# Patient Record
Sex: MALE | Race: WHITE | NOT HISPANIC OR LATINO | Employment: OTHER | URBAN - METROPOLITAN AREA
[De-identification: names, ages, dates, MRNs, and addresses within clinical notes are randomized per-mention and may not be internally consistent; named-entity substitution may affect disease eponyms.]

---

## 2017-01-31 ENCOUNTER — OFFICE VISIT - HEALTHEAST (OUTPATIENT)
Dept: INTERNAL MEDICINE | Facility: CLINIC | Age: 77
End: 2017-01-31

## 2017-01-31 ENCOUNTER — RECORDS - HEALTHEAST (OUTPATIENT)
Dept: GENERAL RADIOLOGY | Facility: CLINIC | Age: 77
End: 2017-01-31

## 2017-01-31 DIAGNOSIS — E78.2 MIXED HYPERLIPIDEMIA: ICD-10-CM

## 2017-01-31 DIAGNOSIS — G89.29 OTHER CHRONIC PAIN: ICD-10-CM

## 2017-01-31 DIAGNOSIS — I10 ESSENTIAL HYPERTENSION: ICD-10-CM

## 2017-01-31 DIAGNOSIS — M25.561 CHRONIC PAIN OF RIGHT KNEE: ICD-10-CM

## 2017-01-31 DIAGNOSIS — I25.10 CHRONIC CORONARY ARTERY DISEASE: ICD-10-CM

## 2017-01-31 DIAGNOSIS — G89.29 CHRONIC PAIN OF RIGHT KNEE: ICD-10-CM

## 2017-01-31 DIAGNOSIS — M25.561 PAIN IN RIGHT KNEE: ICD-10-CM

## 2017-01-31 DIAGNOSIS — I50.20 NYHA CLASS 1 HEART FAILURE WITH REDUCED EJECTION FRACTION (H): ICD-10-CM

## 2017-01-31 ASSESSMENT — MIFFLIN-ST. JEOR: SCORE: 2170.9

## 2017-02-06 ENCOUNTER — COMMUNICATION - HEALTHEAST (OUTPATIENT)
Dept: INTERNAL MEDICINE | Facility: CLINIC | Age: 77
End: 2017-02-06

## 2017-02-17 ENCOUNTER — OFFICE VISIT - HEALTHEAST (OUTPATIENT)
Dept: INTERNAL MEDICINE | Facility: CLINIC | Age: 77
End: 2017-02-17

## 2017-02-17 DIAGNOSIS — I10 ESSENTIAL HYPERTENSION: ICD-10-CM

## 2017-02-17 DIAGNOSIS — I50.20 NYHA CLASS 1 HEART FAILURE WITH REDUCED EJECTION FRACTION (H): ICD-10-CM

## 2017-02-17 DIAGNOSIS — M54.50 CHRONIC MIDLINE LOW BACK PAIN WITHOUT SCIATICA: ICD-10-CM

## 2017-02-17 DIAGNOSIS — E78.2 MIXED HYPERLIPIDEMIA: ICD-10-CM

## 2017-02-17 DIAGNOSIS — G89.29 CHRONIC MIDLINE LOW BACK PAIN WITHOUT SCIATICA: ICD-10-CM

## 2017-02-17 DIAGNOSIS — M17.0 PRIMARY OSTEOARTHRITIS OF BOTH KNEES: ICD-10-CM

## 2017-02-17 DIAGNOSIS — I25.10 CHRONIC CORONARY ARTERY DISEASE: ICD-10-CM

## 2017-02-17 ASSESSMENT — MIFFLIN-ST. JEOR: SCORE: 2157.29

## 2017-02-21 ENCOUNTER — COMMUNICATION - HEALTHEAST (OUTPATIENT)
Dept: INTERNAL MEDICINE | Facility: CLINIC | Age: 77
End: 2017-02-21

## 2017-04-04 ENCOUNTER — RECORDS - HEALTHEAST (OUTPATIENT)
Dept: ADMINISTRATIVE | Facility: OTHER | Age: 77
End: 2017-04-04

## 2017-09-22 ENCOUNTER — COMMUNICATION - HEALTHEAST (OUTPATIENT)
Dept: INTERNAL MEDICINE | Facility: CLINIC | Age: 77
End: 2017-09-22

## 2017-09-28 ENCOUNTER — OFFICE VISIT - HEALTHEAST (OUTPATIENT)
Dept: INTERNAL MEDICINE | Facility: CLINIC | Age: 77
End: 2017-09-28

## 2017-09-28 DIAGNOSIS — Z23 NEED FOR PROPHYLACTIC VACCINATION AND INOCULATION AGAINST INFLUENZA: ICD-10-CM

## 2017-09-28 DIAGNOSIS — I10 ESSENTIAL HYPERTENSION: ICD-10-CM

## 2017-09-28 DIAGNOSIS — I50.20 NYHA CLASS 1 HEART FAILURE WITH REDUCED EJECTION FRACTION (H): ICD-10-CM

## 2017-09-28 DIAGNOSIS — T78.3XXD ANGIOEDEMA, SUBSEQUENT ENCOUNTER: ICD-10-CM

## 2017-09-28 ASSESSMENT — MIFFLIN-ST. JEOR: SCORE: 2175.44

## 2017-10-18 ENCOUNTER — COMMUNICATION - HEALTHEAST (OUTPATIENT)
Dept: INTERNAL MEDICINE | Facility: CLINIC | Age: 77
End: 2017-10-18

## 2017-10-18 ENCOUNTER — OFFICE VISIT - HEALTHEAST (OUTPATIENT)
Dept: INTERNAL MEDICINE | Facility: CLINIC | Age: 77
End: 2017-10-18

## 2017-10-18 DIAGNOSIS — I25.10 CHRONIC CORONARY ARTERY DISEASE: ICD-10-CM

## 2017-10-18 DIAGNOSIS — I10 ESSENTIAL HYPERTENSION: ICD-10-CM

## 2017-10-18 DIAGNOSIS — E66.9 ADIPOSITY: ICD-10-CM

## 2017-10-18 DIAGNOSIS — E78.2 MIXED HYPERLIPIDEMIA: ICD-10-CM

## 2017-10-18 DIAGNOSIS — I50.20 NYHA CLASS 1 HEART FAILURE WITH REDUCED EJECTION FRACTION (H): ICD-10-CM

## 2017-10-18 LAB
CHOLEST SERPL-MCNC: 126 MG/DL
FASTING STATUS PATIENT QL REPORTED: YES
HDLC SERPL-MCNC: 33 MG/DL
LDLC SERPL CALC-MCNC: 70 MG/DL
TRIGL SERPL-MCNC: 114 MG/DL

## 2017-10-18 ASSESSMENT — MIFFLIN-ST. JEOR: SCORE: 2184.51

## 2017-11-16 ENCOUNTER — RECORDS - HEALTHEAST (OUTPATIENT)
Dept: ADMINISTRATIVE | Facility: OTHER | Age: 77
End: 2017-11-16

## 2017-12-15 ENCOUNTER — COMMUNICATION - HEALTHEAST (OUTPATIENT)
Dept: INTERNAL MEDICINE | Facility: CLINIC | Age: 77
End: 2017-12-15

## 2017-12-15 DIAGNOSIS — K21.9 GASTROESOPHAGEAL REFLUX DISEASE WITHOUT ESOPHAGITIS: ICD-10-CM

## 2017-12-15 DIAGNOSIS — I10 ESSENTIAL HYPERTENSION: ICD-10-CM

## 2017-12-15 DIAGNOSIS — E78.2 MIXED HYPERLIPIDEMIA: ICD-10-CM

## 2018-02-06 ENCOUNTER — OFFICE VISIT - HEALTHEAST (OUTPATIENT)
Dept: INTERNAL MEDICINE | Facility: CLINIC | Age: 78
End: 2018-02-06

## 2018-02-06 DIAGNOSIS — E66.9 ADIPOSITY: ICD-10-CM

## 2018-02-06 DIAGNOSIS — M54.50 LOW BACK PAIN: ICD-10-CM

## 2018-02-06 DIAGNOSIS — G89.29 CHRONIC RIGHT-SIDED LOW BACK PAIN WITH RIGHT-SIDED SCIATICA: ICD-10-CM

## 2018-02-06 DIAGNOSIS — M54.41 CHRONIC RIGHT-SIDED LOW BACK PAIN WITH RIGHT-SIDED SCIATICA: ICD-10-CM

## 2018-02-06 DIAGNOSIS — I10 ESSENTIAL HYPERTENSION: ICD-10-CM

## 2018-02-06 DIAGNOSIS — I25.10 CHRONIC CORONARY ARTERY DISEASE: ICD-10-CM

## 2018-02-06 DIAGNOSIS — K21.9 GASTROESOPHAGEAL REFLUX DISEASE WITHOUT ESOPHAGITIS: ICD-10-CM

## 2018-02-06 DIAGNOSIS — I50.20 NYHA CLASS 1 HEART FAILURE WITH REDUCED EJECTION FRACTION (H): ICD-10-CM

## 2018-02-06 ASSESSMENT — MIFFLIN-ST. JEOR: SCORE: 2179.97

## 2018-02-12 ENCOUNTER — HOSPITAL ENCOUNTER (OUTPATIENT)
Dept: PHYSICAL MEDICINE AND REHAB | Facility: CLINIC | Age: 78
Discharge: HOME | End: 2018-02-12
Attending: INTERNAL MEDICINE

## 2018-02-12 DIAGNOSIS — M17.11 PRIMARY OSTEOARTHRITIS OF RIGHT KNEE: ICD-10-CM

## 2018-02-12 DIAGNOSIS — M54.16 LUMBAR RADICULITIS: ICD-10-CM

## 2018-02-12 DIAGNOSIS — M47.816 LUMBAR FACET ARTHROPATHY: ICD-10-CM

## 2018-02-12 DIAGNOSIS — M48.061 LUMBAR SPINAL STENOSIS: ICD-10-CM

## 2018-02-13 ENCOUNTER — HOSPITAL ENCOUNTER (OUTPATIENT)
Dept: CT IMAGING | Facility: CLINIC | Age: 78
Discharge: HOME | End: 2018-02-13
Attending: PHYSICIAN ASSISTANT

## 2018-02-13 DIAGNOSIS — M48.061 LUMBAR SPINAL STENOSIS: ICD-10-CM

## 2018-02-13 DIAGNOSIS — M47.816 LUMBAR FACET ARTHROPATHY: ICD-10-CM

## 2018-02-13 DIAGNOSIS — M54.16 LUMBAR RADICULITIS: ICD-10-CM

## 2018-02-13 DIAGNOSIS — M12.88 OTHER SPECIFIC ARTHROPATHIES, NOT ELSEWHERE CLASSIFIED, OTHER SPECIFIED SITE: ICD-10-CM

## 2018-02-15 ENCOUNTER — COMMUNICATION - HEALTHEAST (OUTPATIENT)
Dept: PHYSICAL MEDICINE AND REHAB | Facility: CLINIC | Age: 78
End: 2018-02-15

## 2018-02-15 DIAGNOSIS — M54.16 LUMBAR RADICULITIS: ICD-10-CM

## 2018-02-21 ENCOUNTER — OFFICE VISIT - HEALTHEAST (OUTPATIENT)
Dept: PHYSICAL THERAPY | Facility: REHABILITATION | Age: 78
End: 2018-02-21

## 2018-02-21 DIAGNOSIS — R68.89 DECREASED STRENGTH, ENDURANCE, AND MOBILITY: ICD-10-CM

## 2018-02-21 DIAGNOSIS — R53.1 DECREASED STRENGTH, ENDURANCE, AND MOBILITY: ICD-10-CM

## 2018-02-21 DIAGNOSIS — R26.81 UNSTEADINESS ON FEET: ICD-10-CM

## 2018-02-21 DIAGNOSIS — M53.86 DECREASED ROM OF LUMBAR SPINE: ICD-10-CM

## 2018-02-21 DIAGNOSIS — M54.41 ACUTE BILATERAL LOW BACK PAIN WITH RIGHT-SIDED SCIATICA: ICD-10-CM

## 2018-02-21 DIAGNOSIS — M62.81 GENERALIZED MUSCLE WEAKNESS: ICD-10-CM

## 2018-02-21 DIAGNOSIS — Z74.09 DECREASED STRENGTH, ENDURANCE, AND MOBILITY: ICD-10-CM

## 2018-02-22 ENCOUNTER — COMMUNICATION - HEALTHEAST (OUTPATIENT)
Dept: PHYSICAL MEDICINE AND REHAB | Facility: CLINIC | Age: 78
End: 2018-02-22

## 2018-02-28 ENCOUNTER — OFFICE VISIT - HEALTHEAST (OUTPATIENT)
Dept: PHYSICAL THERAPY | Facility: REHABILITATION | Age: 78
End: 2018-02-28

## 2018-02-28 DIAGNOSIS — R26.81 UNSTEADINESS ON FEET: ICD-10-CM

## 2018-02-28 DIAGNOSIS — M62.81 GENERALIZED MUSCLE WEAKNESS: ICD-10-CM

## 2018-02-28 DIAGNOSIS — M53.86 DECREASED ROM OF LUMBAR SPINE: ICD-10-CM

## 2018-02-28 DIAGNOSIS — R68.89 DECREASED STRENGTH, ENDURANCE, AND MOBILITY: ICD-10-CM

## 2018-02-28 DIAGNOSIS — R53.1 DECREASED STRENGTH, ENDURANCE, AND MOBILITY: ICD-10-CM

## 2018-02-28 DIAGNOSIS — M54.41 ACUTE BILATERAL LOW BACK PAIN WITH RIGHT-SIDED SCIATICA: ICD-10-CM

## 2018-02-28 DIAGNOSIS — Z74.09 DECREASED STRENGTH, ENDURANCE, AND MOBILITY: ICD-10-CM

## 2018-03-07 ENCOUNTER — OFFICE VISIT - HEALTHEAST (OUTPATIENT)
Dept: INTERNAL MEDICINE | Facility: CLINIC | Age: 78
End: 2018-03-07

## 2018-03-07 DIAGNOSIS — I50.20 NYHA CLASS 1 HEART FAILURE WITH REDUCED EJECTION FRACTION (H): ICD-10-CM

## 2018-03-07 DIAGNOSIS — I10 ESSENTIAL HYPERTENSION: ICD-10-CM

## 2018-03-07 DIAGNOSIS — T78.3XXA ANGIOEDEMA, INITIAL ENCOUNTER: ICD-10-CM

## 2018-03-07 ASSESSMENT — MIFFLIN-ST. JEOR: SCORE: 2123.27

## 2018-03-08 ENCOUNTER — OFFICE VISIT - HEALTHEAST (OUTPATIENT)
Dept: PHYSICAL THERAPY | Facility: REHABILITATION | Age: 78
End: 2018-03-08

## 2018-03-08 DIAGNOSIS — Z74.09 DECREASED STRENGTH, ENDURANCE, AND MOBILITY: ICD-10-CM

## 2018-03-08 DIAGNOSIS — R53.1 DECREASED STRENGTH, ENDURANCE, AND MOBILITY: ICD-10-CM

## 2018-03-08 DIAGNOSIS — R26.81 UNSTEADINESS ON FEET: ICD-10-CM

## 2018-03-08 DIAGNOSIS — M53.86 DECREASED ROM OF LUMBAR SPINE: ICD-10-CM

## 2018-03-08 DIAGNOSIS — M54.41 ACUTE BILATERAL LOW BACK PAIN WITH RIGHT-SIDED SCIATICA: ICD-10-CM

## 2018-03-08 DIAGNOSIS — M62.81 GENERALIZED MUSCLE WEAKNESS: ICD-10-CM

## 2018-03-08 DIAGNOSIS — R68.89 DECREASED STRENGTH, ENDURANCE, AND MOBILITY: ICD-10-CM

## 2018-03-12 ENCOUNTER — OFFICE VISIT - HEALTHEAST (OUTPATIENT)
Dept: ALLERGY | Facility: CLINIC | Age: 78
End: 2018-03-12

## 2018-03-12 DIAGNOSIS — T78.3XXD ANGIOEDEMA, SUBSEQUENT ENCOUNTER: ICD-10-CM

## 2018-03-12 ASSESSMENT — MIFFLIN-ST. JEOR: SCORE: 2121.01

## 2018-03-13 ENCOUNTER — COMMUNICATION - HEALTHEAST (OUTPATIENT)
Dept: INTERNAL MEDICINE | Facility: CLINIC | Age: 78
End: 2018-03-13

## 2018-03-13 DIAGNOSIS — I10 ESSENTIAL HYPERTENSION: ICD-10-CM

## 2018-03-13 DIAGNOSIS — K21.9 GASTROESOPHAGEAL REFLUX DISEASE WITHOUT ESOPHAGITIS: ICD-10-CM

## 2018-03-13 DIAGNOSIS — E78.2 MIXED HYPERLIPIDEMIA: ICD-10-CM

## 2018-03-15 ENCOUNTER — OFFICE VISIT - HEALTHEAST (OUTPATIENT)
Dept: PHYSICAL THERAPY | Facility: REHABILITATION | Age: 78
End: 2018-03-15

## 2018-03-15 DIAGNOSIS — R68.89 DECREASED STRENGTH, ENDURANCE, AND MOBILITY: ICD-10-CM

## 2018-03-15 DIAGNOSIS — R53.1 DECREASED STRENGTH, ENDURANCE, AND MOBILITY: ICD-10-CM

## 2018-03-15 DIAGNOSIS — M53.86 DECREASED ROM OF LUMBAR SPINE: ICD-10-CM

## 2018-03-15 DIAGNOSIS — R26.81 UNSTEADINESS ON FEET: ICD-10-CM

## 2018-03-15 DIAGNOSIS — M54.41 ACUTE BILATERAL LOW BACK PAIN WITH RIGHT-SIDED SCIATICA: ICD-10-CM

## 2018-03-15 DIAGNOSIS — M62.81 GENERALIZED MUSCLE WEAKNESS: ICD-10-CM

## 2018-03-15 DIAGNOSIS — Z74.09 DECREASED STRENGTH, ENDURANCE, AND MOBILITY: ICD-10-CM

## 2018-03-20 ENCOUNTER — RECORDS - HEALTHEAST (OUTPATIENT)
Dept: ADMINISTRATIVE | Facility: OTHER | Age: 78
End: 2018-03-20

## 2018-03-22 ENCOUNTER — OFFICE VISIT - HEALTHEAST (OUTPATIENT)
Dept: PHYSICAL THERAPY | Facility: REHABILITATION | Age: 78
End: 2018-03-22

## 2018-03-22 DIAGNOSIS — M54.41 ACUTE BILATERAL LOW BACK PAIN WITH RIGHT-SIDED SCIATICA: ICD-10-CM

## 2018-03-22 DIAGNOSIS — M53.86 DECREASED ROM OF LUMBAR SPINE: ICD-10-CM

## 2018-03-22 DIAGNOSIS — R53.1 DECREASED STRENGTH, ENDURANCE, AND MOBILITY: ICD-10-CM

## 2018-03-22 DIAGNOSIS — M62.81 GENERALIZED MUSCLE WEAKNESS: ICD-10-CM

## 2018-03-22 DIAGNOSIS — R68.89 DECREASED STRENGTH, ENDURANCE, AND MOBILITY: ICD-10-CM

## 2018-03-22 DIAGNOSIS — Z74.09 DECREASED STRENGTH, ENDURANCE, AND MOBILITY: ICD-10-CM

## 2018-03-22 DIAGNOSIS — R26.81 UNSTEADINESS ON FEET: ICD-10-CM

## 2018-04-18 ENCOUNTER — OFFICE VISIT - HEALTHEAST (OUTPATIENT)
Dept: INTERNAL MEDICINE | Facility: CLINIC | Age: 78
End: 2018-04-18

## 2018-04-18 ENCOUNTER — COMMUNICATION - HEALTHEAST (OUTPATIENT)
Dept: INTERNAL MEDICINE | Facility: CLINIC | Age: 78
End: 2018-04-18

## 2018-04-18 DIAGNOSIS — I50.20 NYHA CLASS 1 HEART FAILURE WITH REDUCED EJECTION FRACTION (H): ICD-10-CM

## 2018-04-18 DIAGNOSIS — E66.9 OBESITY, UNSPECIFIED CLASSIFICATION, UNSPECIFIED OBESITY TYPE, UNSPECIFIED WHETHER SERIOUS COMORBIDITY PRESENT: ICD-10-CM

## 2018-04-18 DIAGNOSIS — K21.9 GASTROESOPHAGEAL REFLUX DISEASE WITHOUT ESOPHAGITIS: ICD-10-CM

## 2018-04-18 DIAGNOSIS — I25.10 CHRONIC CORONARY ARTERY DISEASE: ICD-10-CM

## 2018-04-18 DIAGNOSIS — T78.3XXA ANGIOEDEMA, INITIAL ENCOUNTER: ICD-10-CM

## 2018-04-18 DIAGNOSIS — I10 ESSENTIAL HYPERTENSION: ICD-10-CM

## 2018-04-18 LAB
ALBUMIN SERPL-MCNC: 3.9 G/DL (ref 3.5–5)
ALP SERPL-CCNC: 55 U/L (ref 45–120)
ALT SERPL W P-5'-P-CCNC: 24 U/L (ref 0–45)
ANION GAP SERPL CALCULATED.3IONS-SCNC: 10 MMOL/L (ref 5–18)
AST SERPL W P-5'-P-CCNC: 21 U/L (ref 0–40)
BILIRUB SERPL-MCNC: 2.4 MG/DL (ref 0–1)
BUN SERPL-MCNC: 18 MG/DL (ref 8–28)
CALCIUM SERPL-MCNC: 9.3 MG/DL (ref 8.5–10.5)
CHLORIDE BLD-SCNC: 106 MMOL/L (ref 98–107)
CHOLEST SERPL-MCNC: 115 MG/DL
CO2 SERPL-SCNC: 23 MMOL/L (ref 22–31)
CREAT SERPL-MCNC: 0.8 MG/DL (ref 0.7–1.3)
FASTING STATUS PATIENT QL REPORTED: YES
GFR SERPL CREATININE-BSD FRML MDRD: >60 ML/MIN/1.73M2
GLUCOSE BLD-MCNC: 96 MG/DL (ref 70–125)
HDLC SERPL-MCNC: 34 MG/DL
LDLC SERPL CALC-MCNC: 63 MG/DL
POTASSIUM BLD-SCNC: 4 MMOL/L (ref 3.5–5)
PROT SERPL-MCNC: 6.7 G/DL (ref 6–8)
SODIUM SERPL-SCNC: 139 MMOL/L (ref 136–145)
TRIGL SERPL-MCNC: 92 MG/DL

## 2018-04-18 ASSESSMENT — MIFFLIN-ST. JEOR: SCORE: 2043.9

## 2018-05-01 ENCOUNTER — AMBULATORY - HEALTHEAST (OUTPATIENT)
Dept: CARDIOLOGY | Facility: CLINIC | Age: 78
End: 2018-05-01

## 2018-05-01 ENCOUNTER — OFFICE VISIT - HEALTHEAST (OUTPATIENT)
Dept: CARDIOLOGY | Facility: CLINIC | Age: 78
End: 2018-05-01

## 2018-05-01 DIAGNOSIS — I25.10 CHRONIC CORONARY ARTERY DISEASE: ICD-10-CM

## 2018-05-01 DIAGNOSIS — Z95.810 ICD (IMPLANTABLE CARDIOVERTER-DEFIBRILLATOR), SINGLE, IN SITU: ICD-10-CM

## 2018-05-01 LAB
HCC DEVICE COMMENTS: NORMAL
HCC DEVICE IMPLANTING PROVIDER: NORMAL
HCC DEVICE MANUFACTURE: NORMAL
HCC DEVICE MODEL: NORMAL
HCC DEVICE SERIAL NUMBER: NORMAL
HCC DEVICE TYPE: NORMAL

## 2018-05-01 ASSESSMENT — MIFFLIN-ST. JEOR: SCORE: 2034.82

## 2018-05-17 ENCOUNTER — HOSPITAL ENCOUNTER (OUTPATIENT)
Dept: CARDIOLOGY | Facility: CLINIC | Age: 78
Discharge: HOME | End: 2018-05-17
Attending: INTERNAL MEDICINE

## 2018-05-17 DIAGNOSIS — I25.10 CHRONIC CORONARY ARTERY DISEASE: ICD-10-CM

## 2018-05-17 LAB
AORTIC ROOT: 4.1 CM
AORTIC VALVE MEAN VELOCITY: 85.9 CM/S
ASCENDING AORTA: 3.5 CM
AV CUSP SEPERATION: 2.2 CM
AV CUSP SEPERATION: 2.2 CM
AV DIMENSIONLESS INDEX VTI: 0.8
AV MEAN GRADIENT: 3 MMHG
AV PEAK GRADIENT: 4.4 MMHG
AV VALVE AREA: 3.5 CM2
AV VELOCITY RATIO: 0.7
BSA FOR ECHO PROCEDURE: 2.57 M2
CV ECHO HEIGHT: 72 IN
CV ECHO WEIGHT: 287 LBS
DOP CALC AO PEAK VEL: 105 CM/S
DOP CALC AO VTI: 24.1 CM
DOP CALC LVOT AREA: 4.52 CM2
DOP CALC LVOT DIAMETER: 2.4 CM
DOP CALC LVOT PEAK VEL: 73.7 CM/S
DOP CALC LVOT STROKE VOLUME: 84.1 CM3
DOP CALC MV VTI: 23.2 CM
DOP CALCLVOT PEAK VEL VTI: 18.6 CM
ECHO EJECTION FRACTION ESTIMATED: 50 %
EJECTION FRACTION: 61 % (ref 55–75)
FRACTIONAL SHORTENING: 32.6 % (ref 28–44)
INTERVENTRICULAR SEPTUM IN END DIASTOLE: 1.6 CM (ref 0.6–1)
IVS/PW RATIO: 1.1
LA AREA 1: 23.9 CM2
LA AREA 2: 25.2 CM2
LEFT ATRIUM LENGTH: 5.96 CM
LEFT ATRIUM SIZE: 4.1 CM
LEFT ATRIUM VOLUME INDEX: 33.4 ML/M2
LEFT ATRIUM VOLUME: 85.9 ML
LEFT VENTRICLE CARDIAC INDEX: 2 L/MIN/M2
LEFT VENTRICLE CARDIAC OUTPUT: 5.1 L/MIN
LEFT VENTRICLE DIASTOLIC VOLUME INDEX: 61.9 CM3/M2 (ref 34–74)
LEFT VENTRICLE DIASTOLIC VOLUME: 159 CM3 (ref 62–150)
LEFT VENTRICLE HEART RATE: 61 BPM
LEFT VENTRICLE MASS INDEX: 105.7 G/M2
LEFT VENTRICLE SYSTOLIC VOLUME INDEX: 24.1 CM3/M2 (ref 11–31)
LEFT VENTRICLE SYSTOLIC VOLUME: 62 CM3 (ref 21–61)
LEFT VENTRICULAR INTERNAL DIMENSION IN DIASTOLE: 4.3 CM (ref 4.2–5.8)
LEFT VENTRICULAR INTERNAL DIMENSION IN SYSTOLE: 2.9 CM (ref 2.5–4)
LEFT VENTRICULAR MASS: 271.6 G
LEFT VENTRICULAR OUTFLOW TRACT MEAN GRADIENT: 1 MMHG
LEFT VENTRICULAR OUTFLOW TRACT MEAN VELOCITY: 56.2 CM/S
LEFT VENTRICULAR OUTFLOW TRACT PEAK GRADIENT: 2 MMHG
LEFT VENTRICULAR POSTERIOR WALL IN END DIASTOLE: 1.5 CM (ref 0.6–1)
LV STROKE VOLUME INDEX: 32.7 ML/M2
MITRAL VALVE DECELERATION SLOPE: 3120 MM/S2
MITRAL VALVE E/A RATIO: 0.8
MITRAL VALVE MEAN INFLOW VELOCITY: 41.3 CM/S
MITRAL VALVE PEAK VELOCITY: 83.4 CM/S
MITRAL VALVE PRESSURE HALF-TIME: 81 MS
MV AREA VTI: 3.63 CM2
MV AVERAGE E/E' RATIO: 11.1 CM/S
MV DECELERATION TIME: 190 MS
MV E'TISSUE VEL-LAT: 6.82 CM/S
MV E'TISSUE VEL-MED: 4.48 CM/S
MV LATERAL E/E' RATIO: 9.2
MV MEAN GRADIENT: 1 MMHG
MV MEDIAL E/E' RATIO: 14
MV PEAK A VELOCITY: 76.5 CM/S
MV PEAK E VELOCITY: 62.7 CM/S
MV PEAK GRADIENT: 2.8 MMHG
MV VALVE AREA BY CONTINUITY EQUATION: 3.6 CM2
MV VALVE AREA PRESSURE 1/2 METHOD: 2.7 CM2
NUC REST DIASTOLIC VOLUME INDEX: 4592 LBS
NUC REST SYSTOLIC VOLUME INDEX: 72 IN
TRICUSPID REGURGITATION PEAK PRESSURE GRADIENT: 14.9 MMHG
TRICUSPID VALVE ANULAR PLANE SYSTOLIC EXCURSION: 2.5 CM
TRICUSPID VALVE PEAK REGURGITANT VELOCITY: 193 CM/S

## 2018-05-17 ASSESSMENT — MIFFLIN-ST. JEOR: SCORE: 2034.82

## 2018-06-05 ENCOUNTER — COMMUNICATION - HEALTHEAST (OUTPATIENT)
Dept: INTERNAL MEDICINE | Facility: CLINIC | Age: 78
End: 2018-06-05

## 2018-07-10 ENCOUNTER — RECORDS - HEALTHEAST (OUTPATIENT)
Dept: ADMINISTRATIVE | Facility: OTHER | Age: 78
End: 2018-07-10

## 2018-07-11 ENCOUNTER — OFFICE VISIT - HEALTHEAST (OUTPATIENT)
Dept: INTERNAL MEDICINE | Facility: CLINIC | Age: 78
End: 2018-07-11

## 2018-07-11 DIAGNOSIS — I50.20 NYHA CLASS 1 HEART FAILURE WITH REDUCED EJECTION FRACTION (H): ICD-10-CM

## 2018-07-11 DIAGNOSIS — I25.10 CHRONIC CORONARY ARTERY DISEASE: ICD-10-CM

## 2018-07-11 DIAGNOSIS — E66.01 MORBID OBESITY (H): ICD-10-CM

## 2018-07-11 DIAGNOSIS — Z95.810 ICD (IMPLANTABLE CARDIOVERTER-DEFIBRILLATOR), SINGLE, IN SITU: ICD-10-CM

## 2018-07-11 DIAGNOSIS — I10 ESSENTIAL HYPERTENSION: ICD-10-CM

## 2018-07-11 DIAGNOSIS — E78.2 MIXED HYPERLIPIDEMIA: ICD-10-CM

## 2018-07-11 DIAGNOSIS — T78.3XXA ANGIOEDEMA, INITIAL ENCOUNTER: ICD-10-CM

## 2018-07-11 ASSESSMENT — MIFFLIN-ST. JEOR: SCORE: 2025.75

## 2018-09-06 ENCOUNTER — COMMUNICATION - HEALTHEAST (OUTPATIENT)
Dept: INTERNAL MEDICINE | Facility: CLINIC | Age: 78
End: 2018-09-06

## 2018-09-06 DIAGNOSIS — I10 ESSENTIAL HYPERTENSION: ICD-10-CM

## 2018-09-06 DIAGNOSIS — K21.9 GASTROESOPHAGEAL REFLUX DISEASE WITHOUT ESOPHAGITIS: ICD-10-CM

## 2018-09-06 DIAGNOSIS — E78.2 MIXED HYPERLIPIDEMIA: ICD-10-CM

## 2018-09-11 ENCOUNTER — AMBULATORY - HEALTHEAST (OUTPATIENT)
Dept: CARDIOLOGY | Facility: CLINIC | Age: 78
End: 2018-09-11

## 2018-09-11 DIAGNOSIS — Z95.810 ICD (IMPLANTABLE CARDIOVERTER-DEFIBRILLATOR), SINGLE, IN SITU: ICD-10-CM

## 2018-10-17 ENCOUNTER — OFFICE VISIT - HEALTHEAST (OUTPATIENT)
Dept: INTERNAL MEDICINE | Facility: CLINIC | Age: 78
End: 2018-10-17

## 2018-10-17 ENCOUNTER — COMMUNICATION - HEALTHEAST (OUTPATIENT)
Dept: INTERNAL MEDICINE | Facility: CLINIC | Age: 78
End: 2018-10-17

## 2018-10-17 DIAGNOSIS — T78.3XXA ANGIOEDEMA, INITIAL ENCOUNTER: ICD-10-CM

## 2018-10-17 DIAGNOSIS — I25.10 CHRONIC CORONARY ARTERY DISEASE: ICD-10-CM

## 2018-10-17 DIAGNOSIS — Z13.1 SCREENING FOR DIABETES MELLITUS: ICD-10-CM

## 2018-10-17 DIAGNOSIS — E66.01 MORBID OBESITY (H): ICD-10-CM

## 2018-10-17 DIAGNOSIS — I50.20 NYHA CLASS 1 HEART FAILURE WITH REDUCED EJECTION FRACTION (H): ICD-10-CM

## 2018-10-17 DIAGNOSIS — K80.20 CALCULUS OF GALLBLADDER WITHOUT CHOLECYSTITIS WITHOUT OBSTRUCTION: ICD-10-CM

## 2018-10-17 DIAGNOSIS — E78.2 MIXED HYPERLIPIDEMIA: ICD-10-CM

## 2018-10-17 DIAGNOSIS — R10.30 LOWER ABDOMINAL PAIN: ICD-10-CM

## 2018-10-17 DIAGNOSIS — I10 ESSENTIAL HYPERTENSION: ICD-10-CM

## 2018-10-17 LAB
CHOLEST SERPL-MCNC: 126 MG/DL
FASTING STATUS PATIENT QL REPORTED: YES
FASTING STATUS PATIENT QL REPORTED: YES
GLUCOSE BLD-MCNC: 96 MG/DL (ref 70–125)
HDLC SERPL-MCNC: 36 MG/DL
LDLC SERPL CALC-MCNC: 72 MG/DL
TRIGL SERPL-MCNC: 89 MG/DL

## 2018-10-17 ASSESSMENT — MIFFLIN-ST. JEOR: SCORE: 1980.39

## 2018-11-05 ENCOUNTER — RECORDS - HEALTHEAST (OUTPATIENT)
Dept: ADMINISTRATIVE | Facility: OTHER | Age: 78
End: 2018-11-05

## 2018-11-07 ENCOUNTER — OFFICE VISIT - HEALTHEAST (OUTPATIENT)
Dept: INTERNAL MEDICINE | Facility: CLINIC | Age: 78
End: 2018-11-07

## 2018-11-07 ENCOUNTER — HOSPITAL ENCOUNTER (OUTPATIENT)
Dept: ULTRASOUND IMAGING | Facility: CLINIC | Age: 78
Discharge: HOME | End: 2018-11-07
Attending: INTERNAL MEDICINE

## 2018-11-07 DIAGNOSIS — I50.20 NYHA CLASS 1 HEART FAILURE WITH REDUCED EJECTION FRACTION (H): ICD-10-CM

## 2018-11-07 DIAGNOSIS — Z01.818 PREOPERATIVE EXAMINATION: ICD-10-CM

## 2018-11-07 DIAGNOSIS — Z95.810 ICD (IMPLANTABLE CARDIOVERTER-DEFIBRILLATOR), SINGLE, IN SITU: ICD-10-CM

## 2018-11-07 DIAGNOSIS — G47.33 OSA (OBSTRUCTIVE SLEEP APNEA): ICD-10-CM

## 2018-11-07 DIAGNOSIS — T78.3XXA ANGIOEDEMA, INITIAL ENCOUNTER: ICD-10-CM

## 2018-11-07 DIAGNOSIS — I10 ESSENTIAL HYPERTENSION: ICD-10-CM

## 2018-11-07 DIAGNOSIS — N50.89 SCROTAL SWELLING: ICD-10-CM

## 2018-11-07 DIAGNOSIS — K81.0 ACUTE CHOLECYSTITIS: ICD-10-CM

## 2018-11-07 DIAGNOSIS — I25.10 CHRONIC CORONARY ARTERY DISEASE: ICD-10-CM

## 2018-11-07 LAB
ALBUMIN SERPL-MCNC: 3.8 G/DL (ref 3.5–5)
ALBUMIN UR-MCNC: ABNORMAL MG/DL
ALP SERPL-CCNC: 47 U/L (ref 45–120)
ALT SERPL W P-5'-P-CCNC: 21 U/L (ref 0–45)
ANION GAP SERPL CALCULATED.3IONS-SCNC: 14 MMOL/L (ref 5–18)
APPEARANCE UR: CLEAR
AST SERPL W P-5'-P-CCNC: 23 U/L (ref 0–40)
ATRIAL RATE - MUSE: 91 BPM
BACTERIA #/AREA URNS HPF: ABNORMAL HPF
BILIRUB SERPL-MCNC: 2.4 MG/DL (ref 0–1)
BILIRUB UR QL STRIP: ABNORMAL
BUN SERPL-MCNC: 25 MG/DL (ref 8–28)
CALCIUM SERPL-MCNC: 9.5 MG/DL (ref 8.5–10.5)
CHLORIDE BLD-SCNC: 102 MMOL/L (ref 98–107)
CO2 SERPL-SCNC: 23 MMOL/L (ref 22–31)
COLOR UR AUTO: YELLOW
CREAT SERPL-MCNC: 0.92 MG/DL (ref 0.7–1.3)
DIASTOLIC BLOOD PRESSURE - MUSE: NORMAL MMHG
ERYTHROCYTE [DISTWIDTH] IN BLOOD BY AUTOMATED COUNT: 11.7 % (ref 11–14.5)
GFR SERPL CREATININE-BSD FRML MDRD: >60 ML/MIN/1.73M2
GLUCOSE BLD-MCNC: 116 MG/DL (ref 70–125)
GLUCOSE UR STRIP-MCNC: NEGATIVE MG/DL
HCT VFR BLD AUTO: 49 % (ref 40–54)
HGB BLD-MCNC: 16.4 G/DL (ref 14–18)
HGB UR QL STRIP: ABNORMAL
INTERPRETATION ECG - MUSE: NORMAL
KETONES UR STRIP-MCNC: NEGATIVE MG/DL
LEUKOCYTE ESTERASE UR QL STRIP: NEGATIVE
MCH RBC QN AUTO: 31.9 PG (ref 27–34)
MCHC RBC AUTO-ENTMCNC: 33.6 G/DL (ref 32–36)
MCV RBC AUTO: 95 FL (ref 80–100)
MUCOUS THREADS #/AREA URNS LPF: ABNORMAL LPF
NITRATE UR QL: NEGATIVE
P AXIS - MUSE: 24 DEGREES
PH UR STRIP: 5.5 [PH] (ref 5–8)
PLATELET # BLD AUTO: 162 THOU/UL (ref 140–440)
PMV BLD AUTO: 8.3 FL (ref 7–10)
POTASSIUM BLD-SCNC: 3.5 MMOL/L (ref 3.5–5)
PR INTERVAL - MUSE: 200 MS
PROT SERPL-MCNC: 6.8 G/DL (ref 6–8)
QRS DURATION - MUSE: 118 MS
QT - MUSE: 380 MS
QTC - MUSE: 467 MS
R AXIS - MUSE: -83 DEGREES
RBC # BLD AUTO: 5.15 MILL/UL (ref 4.4–6.2)
RBC #/AREA URNS AUTO: ABNORMAL HPF
SODIUM SERPL-SCNC: 139 MMOL/L (ref 136–145)
SP GR UR STRIP: >=1.03 (ref 1–1.03)
SQUAMOUS #/AREA URNS AUTO: ABNORMAL LPF
SYSTOLIC BLOOD PRESSURE - MUSE: NORMAL MMHG
T AXIS - MUSE: 65 DEGREES
UROBILINOGEN UR STRIP-ACNC: ABNORMAL
VENTRICULAR RATE- MUSE: 91 BPM
WBC #/AREA URNS AUTO: ABNORMAL HPF
WBC: 13 THOU/UL (ref 4–11)

## 2018-11-07 ASSESSMENT — MIFFLIN-ST. JEOR: SCORE: 1984.93

## 2018-11-08 ENCOUNTER — RECORDS - HEALTHEAST (OUTPATIENT)
Dept: ADMINISTRATIVE | Facility: OTHER | Age: 78
End: 2018-11-08

## 2018-11-09 ENCOUNTER — ANESTHESIA - HEALTHEAST (OUTPATIENT)
Dept: SURGERY | Facility: CLINIC | Age: 78
End: 2018-11-09

## 2018-11-09 ENCOUNTER — SURGERY - HEALTHEAST (OUTPATIENT)
Dept: SURGERY | Facility: CLINIC | Age: 78
End: 2018-11-09

## 2018-11-09 ASSESSMENT — MIFFLIN-ST. JEOR: SCORE: 1953.46

## 2018-12-13 ENCOUNTER — AMBULATORY - HEALTHEAST (OUTPATIENT)
Dept: CARDIOLOGY | Facility: CLINIC | Age: 78
End: 2018-12-13

## 2018-12-13 DIAGNOSIS — Z95.810 ICD (IMPLANTABLE CARDIOVERTER-DEFIBRILLATOR), SINGLE, IN SITU: ICD-10-CM

## 2019-01-04 ENCOUNTER — COMMUNICATION - HEALTHEAST (OUTPATIENT)
Dept: ADMINISTRATIVE | Facility: CLINIC | Age: 79
End: 2019-01-04

## 2019-01-17 ENCOUNTER — COMMUNICATION - HEALTHEAST (OUTPATIENT)
Dept: INTERNAL MEDICINE | Facility: CLINIC | Age: 79
End: 2019-01-17

## 2019-01-17 ENCOUNTER — OFFICE VISIT - HEALTHEAST (OUTPATIENT)
Dept: INTERNAL MEDICINE | Facility: CLINIC | Age: 79
End: 2019-01-17

## 2019-01-17 DIAGNOSIS — I25.10 CHRONIC CORONARY ARTERY DISEASE: ICD-10-CM

## 2019-01-17 DIAGNOSIS — L50.8 CHRONIC URTICARIA: ICD-10-CM

## 2019-01-17 DIAGNOSIS — H91.90 HEARING LOSS, UNSPECIFIED HEARING LOSS TYPE, UNSPECIFIED LATERALITY: ICD-10-CM

## 2019-01-17 DIAGNOSIS — I50.20 NYHA CLASS 1 HEART FAILURE WITH REDUCED EJECTION FRACTION (H): ICD-10-CM

## 2019-01-17 DIAGNOSIS — R73.9 HYPERGLYCEMIA: ICD-10-CM

## 2019-01-17 DIAGNOSIS — G47.33 OSA (OBSTRUCTIVE SLEEP APNEA): ICD-10-CM

## 2019-01-17 DIAGNOSIS — T78.3XXA ANGIOEDEMA, INITIAL ENCOUNTER: ICD-10-CM

## 2019-01-17 DIAGNOSIS — E66.01 MORBID OBESITY (H): ICD-10-CM

## 2019-01-17 DIAGNOSIS — I10 ESSENTIAL HYPERTENSION: ICD-10-CM

## 2019-01-17 LAB
ALBUMIN SERPL-MCNC: 4 G/DL (ref 3.5–5)
ALBUMIN UR-MCNC: NEGATIVE MG/DL
ALP SERPL-CCNC: 56 U/L (ref 45–120)
ALT SERPL W P-5'-P-CCNC: 21 U/L (ref 0–45)
ANION GAP SERPL CALCULATED.3IONS-SCNC: 12 MMOL/L (ref 5–18)
APPEARANCE UR: CLEAR
AST SERPL W P-5'-P-CCNC: 20 U/L (ref 0–40)
BACTERIA #/AREA URNS HPF: ABNORMAL HPF
BASOPHILS # BLD AUTO: 0.1 THOU/UL (ref 0–0.2)
BASOPHILS NFR BLD AUTO: 1 % (ref 0–2)
BILIRUB SERPL-MCNC: 2.5 MG/DL (ref 0–1)
BILIRUB UR QL STRIP: NEGATIVE
BUN SERPL-MCNC: 16 MG/DL (ref 8–28)
C REACTIVE PROTEIN LHE: 0.2 MG/DL (ref 0–0.8)
CALCIUM SERPL-MCNC: 9.6 MG/DL (ref 8.5–10.5)
CHLORIDE BLD-SCNC: 102 MMOL/L (ref 98–107)
CHOLEST SERPL-MCNC: 125 MG/DL
CO2 SERPL-SCNC: 24 MMOL/L (ref 22–31)
COLOR UR AUTO: YELLOW
CREAT SERPL-MCNC: 0.9 MG/DL (ref 0.7–1.3)
CREAT UR-MCNC: 155 MG/DL
EOSINOPHIL # BLD AUTO: 0.2 THOU/UL (ref 0–0.4)
EOSINOPHIL NFR BLD AUTO: 3 % (ref 0–6)
ERYTHROCYTE [DISTWIDTH] IN BLOOD BY AUTOMATED COUNT: 12.2 % (ref 11–14.5)
ERYTHROCYTE [SEDIMENTATION RATE] IN BLOOD BY WESTERGREN METHOD: 2 MM/HR (ref 0–15)
FASTING STATUS PATIENT QL REPORTED: YES
GFR SERPL CREATININE-BSD FRML MDRD: >60 ML/MIN/1.73M2
GLUCOSE BLD-MCNC: 90 MG/DL (ref 70–125)
GLUCOSE UR STRIP-MCNC: NEGATIVE MG/DL
HBA1C MFR BLD: 5.7 % (ref 3.5–6)
HCT VFR BLD AUTO: 49.5 % (ref 40–54)
HDLC SERPL-MCNC: 38 MG/DL
HGB BLD-MCNC: 16.7 G/DL (ref 14–18)
HGB UR QL STRIP: ABNORMAL
KETONES UR STRIP-MCNC: NEGATIVE MG/DL
LDLC SERPL CALC-MCNC: 67 MG/DL
LEUKOCYTE ESTERASE UR QL STRIP: NEGATIVE
LYMPHOCYTES # BLD AUTO: 1.3 THOU/UL (ref 0.8–4.4)
LYMPHOCYTES NFR BLD AUTO: 16 % (ref 20–40)
MCH RBC QN AUTO: 31.8 PG (ref 27–34)
MCHC RBC AUTO-ENTMCNC: 33.8 G/DL (ref 32–36)
MCV RBC AUTO: 94 FL (ref 80–100)
MONOCYTES # BLD AUTO: 0.7 THOU/UL (ref 0–0.9)
MONOCYTES NFR BLD AUTO: 9 % (ref 2–10)
MUCOUS THREADS #/AREA URNS LPF: ABNORMAL LPF
NEUTROPHILS # BLD AUTO: 5.9 THOU/UL (ref 2–7.7)
NEUTROPHILS NFR BLD AUTO: 72 % (ref 50–70)
NITRATE UR QL: NEGATIVE
PH UR STRIP: 5.5 [PH] (ref 5–8)
PLATELET # BLD AUTO: 144 THOU/UL (ref 140–440)
PMV BLD AUTO: 8.5 FL (ref 7–10)
POTASSIUM BLD-SCNC: 3.4 MMOL/L (ref 3.5–5)
PROT SERPL-MCNC: 6.8 G/DL (ref 6–8)
PROTEIN, RANDOM URINE - HISTORICAL: 11 MG/DL
PROTEIN/CREAT RATIO, RANDOM UR: 0.07
RBC # BLD AUTO: 5.25 MILL/UL (ref 4.4–6.2)
RBC #/AREA URNS AUTO: ABNORMAL HPF
SODIUM SERPL-SCNC: 138 MMOL/L (ref 136–145)
SP GR UR STRIP: 1.02 (ref 1–1.03)
SPERM #/AREA URNS HPF: PRESENT /[HPF]
SQUAMOUS #/AREA URNS AUTO: ABNORMAL LPF
TRIGL SERPL-MCNC: 100 MG/DL
UROBILINOGEN UR STRIP-ACNC: ABNORMAL
WBC #/AREA URNS AUTO: ABNORMAL HPF
WBC: 8.2 THOU/UL (ref 4–11)

## 2019-01-17 ASSESSMENT — MIFFLIN-ST. JEOR: SCORE: 1966.78

## 2019-02-13 ENCOUNTER — OFFICE VISIT - HEALTHEAST (OUTPATIENT)
Dept: OTOLARYNGOLOGY | Facility: CLINIC | Age: 79
End: 2019-02-13

## 2019-02-13 ENCOUNTER — OFFICE VISIT - HEALTHEAST (OUTPATIENT)
Dept: AUDIOLOGY | Facility: CLINIC | Age: 79
End: 2019-02-13

## 2019-02-13 ENCOUNTER — RECORDS - HEALTHEAST (OUTPATIENT)
Dept: ADMINISTRATIVE | Facility: OTHER | Age: 79
End: 2019-02-13

## 2019-02-13 DIAGNOSIS — H93.13 TINNITUS OF BOTH EARS: ICD-10-CM

## 2019-02-13 DIAGNOSIS — H90.3 ASNHL (ASYMMETRICAL SENSORINEURAL HEARING LOSS): ICD-10-CM

## 2019-02-13 DIAGNOSIS — H90.3 SENSORINEURAL HEARING LOSS, BILATERAL: ICD-10-CM

## 2019-02-18 ENCOUNTER — AMBULATORY - HEALTHEAST (OUTPATIENT)
Dept: CARDIOLOGY | Facility: CLINIC | Age: 79
End: 2019-02-18

## 2019-02-18 ENCOUNTER — OFFICE VISIT - HEALTHEAST (OUTPATIENT)
Dept: CARDIOLOGY | Facility: CLINIC | Age: 79
End: 2019-02-18

## 2019-02-18 DIAGNOSIS — Z95.810 ICD (IMPLANTABLE CARDIOVERTER-DEFIBRILLATOR), SINGLE, IN SITU: ICD-10-CM

## 2019-02-18 DIAGNOSIS — I25.10 CHRONIC CORONARY ARTERY DISEASE: ICD-10-CM

## 2019-02-18 ASSESSMENT — MIFFLIN-ST. JEOR: SCORE: 1968.73

## 2019-02-20 ENCOUNTER — COMMUNICATION - HEALTHEAST (OUTPATIENT)
Dept: ADMINISTRATIVE | Facility: CLINIC | Age: 79
End: 2019-02-20

## 2019-02-21 ENCOUNTER — OFFICE VISIT - HEALTHEAST (OUTPATIENT)
Dept: AUDIOLOGY | Facility: CLINIC | Age: 79
End: 2019-02-21

## 2019-02-21 DIAGNOSIS — H90.3 SENSORINEURAL HEARING LOSS, BILATERAL: ICD-10-CM

## 2019-02-22 ENCOUNTER — HOSPITAL ENCOUNTER (OUTPATIENT)
Dept: CT IMAGING | Facility: CLINIC | Age: 79
Discharge: HOME | End: 2019-02-22
Attending: OTOLARYNGOLOGY

## 2019-02-22 DIAGNOSIS — H90.3 ASNHL (ASYMMETRICAL SENSORINEURAL HEARING LOSS): ICD-10-CM

## 2019-03-15 ENCOUNTER — COMMUNICATION - HEALTHEAST (OUTPATIENT)
Dept: INTERNAL MEDICINE | Facility: CLINIC | Age: 79
End: 2019-03-15

## 2019-03-15 DIAGNOSIS — E78.2 MIXED HYPERLIPIDEMIA: ICD-10-CM

## 2019-03-15 DIAGNOSIS — I10 ESSENTIAL HYPERTENSION: ICD-10-CM

## 2019-04-30 ENCOUNTER — OFFICE VISIT - HEALTHEAST (OUTPATIENT)
Dept: INTERNAL MEDICINE | Facility: CLINIC | Age: 79
End: 2019-04-30

## 2019-04-30 DIAGNOSIS — E78.2 MIXED HYPERLIPIDEMIA: ICD-10-CM

## 2019-04-30 DIAGNOSIS — E66.01 MORBID OBESITY (H): ICD-10-CM

## 2019-04-30 DIAGNOSIS — I50.20 NYHA CLASS 1 HEART FAILURE WITH REDUCED EJECTION FRACTION (H): ICD-10-CM

## 2019-04-30 DIAGNOSIS — M17.11 PRIMARY OSTEOARTHRITIS OF RIGHT KNEE: ICD-10-CM

## 2019-04-30 DIAGNOSIS — I10 ESSENTIAL HYPERTENSION: ICD-10-CM

## 2019-04-30 DIAGNOSIS — K21.9 GASTROESOPHAGEAL REFLUX DISEASE WITHOUT ESOPHAGITIS: ICD-10-CM

## 2019-04-30 DIAGNOSIS — T78.3XXA ANGIOEDEMA, INITIAL ENCOUNTER: ICD-10-CM

## 2019-04-30 LAB
ANION GAP SERPL CALCULATED.3IONS-SCNC: 7 MMOL/L (ref 5–18)
BUN SERPL-MCNC: 23 MG/DL (ref 8–28)
CALCIUM SERPL-MCNC: 9.3 MG/DL (ref 8.5–10.5)
CHLORIDE BLD-SCNC: 106 MMOL/L (ref 98–107)
CO2 SERPL-SCNC: 27 MMOL/L (ref 22–31)
CREAT SERPL-MCNC: 0.84 MG/DL (ref 0.7–1.3)
GFR SERPL CREATININE-BSD FRML MDRD: >60 ML/MIN/1.73M2
GLUCOSE BLD-MCNC: 96 MG/DL (ref 70–125)
POTASSIUM BLD-SCNC: 3.6 MMOL/L (ref 3.5–5)
SODIUM SERPL-SCNC: 140 MMOL/L (ref 136–145)

## 2019-04-30 ASSESSMENT — MIFFLIN-ST. JEOR: SCORE: 1957.71

## 2019-05-01 ENCOUNTER — COMMUNICATION - HEALTHEAST (OUTPATIENT)
Dept: INTERNAL MEDICINE | Facility: CLINIC | Age: 79
End: 2019-05-01

## 2019-05-21 ENCOUNTER — AMBULATORY - HEALTHEAST (OUTPATIENT)
Dept: CARDIOLOGY | Facility: CLINIC | Age: 79
End: 2019-05-21

## 2019-05-21 DIAGNOSIS — Z95.810 ICD (IMPLANTABLE CARDIOVERTER-DEFIBRILLATOR), SINGLE, IN SITU: ICD-10-CM

## 2019-06-03 ENCOUNTER — COMMUNICATION - HEALTHEAST (OUTPATIENT)
Dept: CARE COORDINATION | Facility: CLINIC | Age: 79
End: 2019-06-03

## 2019-06-04 ENCOUNTER — OFFICE VISIT - HEALTHEAST (OUTPATIENT)
Dept: INTERNAL MEDICINE | Facility: CLINIC | Age: 79
End: 2019-06-04

## 2019-06-04 DIAGNOSIS — T78.3XXD ANGIOEDEMA, SUBSEQUENT ENCOUNTER: ICD-10-CM

## 2019-06-04 DIAGNOSIS — I10 ESSENTIAL HYPERTENSION: ICD-10-CM

## 2019-06-04 DIAGNOSIS — I25.10 CHRONIC CORONARY ARTERY DISEASE: ICD-10-CM

## 2019-06-04 DIAGNOSIS — L50.9 URTICARIA: ICD-10-CM

## 2019-06-04 DIAGNOSIS — I50.20 NYHA CLASS 1 HEART FAILURE WITH REDUCED EJECTION FRACTION (H): ICD-10-CM

## 2019-06-04 ASSESSMENT — MIFFLIN-ST. JEOR: SCORE: 1957.71

## 2019-08-06 ENCOUNTER — OFFICE VISIT - HEALTHEAST (OUTPATIENT)
Dept: INTERNAL MEDICINE | Facility: CLINIC | Age: 79
End: 2019-08-06

## 2019-08-06 DIAGNOSIS — E66.01 MORBID OBESITY (H): ICD-10-CM

## 2019-08-06 DIAGNOSIS — I50.20 NYHA CLASS 1 HEART FAILURE WITH REDUCED EJECTION FRACTION (H): ICD-10-CM

## 2019-08-06 DIAGNOSIS — T78.3XXD ANGIOEDEMA, SUBSEQUENT ENCOUNTER: ICD-10-CM

## 2019-08-06 DIAGNOSIS — L50.9 URTICARIA: ICD-10-CM

## 2019-08-06 DIAGNOSIS — I10 ESSENTIAL HYPERTENSION: ICD-10-CM

## 2019-08-06 DIAGNOSIS — I25.10 CHRONIC CORONARY ARTERY DISEASE: ICD-10-CM

## 2019-08-06 DIAGNOSIS — E78.2 MIXED HYPERLIPIDEMIA: ICD-10-CM

## 2019-08-06 RX ORDER — FEXOFENADINE HCL 180 MG/1
180 TABLET ORAL DAILY
Qty: 90 TABLET | Refills: 3 | Status: SHIPPED | OUTPATIENT
Start: 2019-08-06 | End: 2021-10-04

## 2019-08-06 ASSESSMENT — MIFFLIN-ST. JEOR: SCORE: 1971.32

## 2019-08-22 ENCOUNTER — AMBULATORY - HEALTHEAST (OUTPATIENT)
Dept: CARDIOLOGY | Facility: CLINIC | Age: 79
End: 2019-08-22

## 2019-08-22 DIAGNOSIS — Z95.810 ICD (IMPLANTABLE CARDIOVERTER-DEFIBRILLATOR), SINGLE, IN SITU: ICD-10-CM

## 2019-09-06 ENCOUNTER — COMMUNICATION - HEALTHEAST (OUTPATIENT)
Dept: INTERNAL MEDICINE | Facility: CLINIC | Age: 79
End: 2019-09-06

## 2019-09-06 DIAGNOSIS — K21.9 GASTROESOPHAGEAL REFLUX DISEASE WITHOUT ESOPHAGITIS: ICD-10-CM

## 2019-09-06 RX ORDER — FAMOTIDINE 20 MG/1
TABLET, FILM COATED ORAL
Qty: 180 TABLET | Refills: 3 | Status: SHIPPED | OUTPATIENT
Start: 2019-09-06 | End: 2021-10-04

## 2019-11-06 ENCOUNTER — OFFICE VISIT - HEALTHEAST (OUTPATIENT)
Dept: INTERNAL MEDICINE | Facility: CLINIC | Age: 79
End: 2019-11-06

## 2019-11-06 ENCOUNTER — COMMUNICATION - HEALTHEAST (OUTPATIENT)
Dept: INTERNAL MEDICINE | Facility: CLINIC | Age: 79
End: 2019-11-06

## 2019-11-06 DIAGNOSIS — Z23 NEED FOR PROPHYLACTIC VACCINATION AND INOCULATION AGAINST INFLUENZA: ICD-10-CM

## 2019-11-06 DIAGNOSIS — I50.20 NYHA CLASS 1 HEART FAILURE WITH REDUCED EJECTION FRACTION (H): ICD-10-CM

## 2019-11-06 DIAGNOSIS — E66.01 MORBID OBESITY (H): ICD-10-CM

## 2019-11-06 DIAGNOSIS — T78.3XXD ANGIOEDEMA, SUBSEQUENT ENCOUNTER: ICD-10-CM

## 2019-11-06 DIAGNOSIS — Z88.8 ASPIRIN ALLERGY: ICD-10-CM

## 2019-11-06 DIAGNOSIS — I25.10 CHRONIC CORONARY ARTERY DISEASE: ICD-10-CM

## 2019-11-06 DIAGNOSIS — E78.2 MIXED HYPERLIPIDEMIA: ICD-10-CM

## 2019-11-06 DIAGNOSIS — I10 ESSENTIAL HYPERTENSION: ICD-10-CM

## 2019-11-06 LAB
ANION GAP SERPL CALCULATED.3IONS-SCNC: 10 MMOL/L (ref 5–18)
BUN SERPL-MCNC: 18 MG/DL (ref 8–28)
CALCIUM SERPL-MCNC: 9.4 MG/DL (ref 8.5–10.5)
CHLORIDE BLD-SCNC: 102 MMOL/L (ref 98–107)
CHOLEST SERPL-MCNC: 116 MG/DL
CO2 SERPL-SCNC: 26 MMOL/L (ref 22–31)
CREAT SERPL-MCNC: 0.91 MG/DL (ref 0.7–1.3)
ERYTHROCYTE [DISTWIDTH] IN BLOOD BY AUTOMATED COUNT: 12.5 % (ref 11–14.5)
FASTING STATUS PATIENT QL REPORTED: YES
GFR SERPL CREATININE-BSD FRML MDRD: >60 ML/MIN/1.73M2
GLUCOSE BLD-MCNC: 98 MG/DL (ref 70–125)
HCT VFR BLD AUTO: 50.2 % (ref 40–54)
HDLC SERPL-MCNC: 35 MG/DL
HGB BLD-MCNC: 17.2 G/DL (ref 14–18)
LDLC SERPL CALC-MCNC: 62 MG/DL
MCH RBC QN AUTO: 31.7 PG (ref 27–34)
MCHC RBC AUTO-ENTMCNC: 34.2 G/DL (ref 32–36)
MCV RBC AUTO: 93 FL (ref 80–100)
PLATELET # BLD AUTO: 152 THOU/UL (ref 140–440)
PMV BLD AUTO: 7.9 FL (ref 7–10)
POTASSIUM BLD-SCNC: 3.7 MMOL/L (ref 3.5–5)
RBC # BLD AUTO: 5.43 MILL/UL (ref 4.4–6.2)
SODIUM SERPL-SCNC: 138 MMOL/L (ref 136–145)
TRIGL SERPL-MCNC: 95 MG/DL
WBC: 7.9 THOU/UL (ref 4–11)

## 2019-11-06 ASSESSMENT — MIFFLIN-ST. JEOR: SCORE: 1971.32

## 2019-11-08 ENCOUNTER — OFFICE VISIT - HEALTHEAST (OUTPATIENT)
Dept: CARDIOLOGY | Facility: CLINIC | Age: 79
End: 2019-11-08

## 2019-11-08 ENCOUNTER — AMBULATORY - HEALTHEAST (OUTPATIENT)
Dept: CARDIOLOGY | Facility: CLINIC | Age: 79
End: 2019-11-08

## 2019-11-08 DIAGNOSIS — I25.10 CHRONIC CORONARY ARTERY DISEASE: ICD-10-CM

## 2019-11-08 DIAGNOSIS — I42.9 CARDIOMYOPATHY (H): ICD-10-CM

## 2019-11-08 DIAGNOSIS — Z95.810 ICD (IMPLANTABLE CARDIOVERTER-DEFIBRILLATOR), SINGLE, IN SITU: ICD-10-CM

## 2019-11-08 ASSESSMENT — MIFFLIN-ST. JEOR: SCORE: 1971.32

## 2019-12-04 ENCOUNTER — COMMUNICATION - HEALTHEAST (OUTPATIENT)
Dept: INTERNAL MEDICINE | Facility: CLINIC | Age: 79
End: 2019-12-04

## 2019-12-04 DIAGNOSIS — I10 ESSENTIAL HYPERTENSION: ICD-10-CM

## 2019-12-04 DIAGNOSIS — E78.2 MIXED HYPERLIPIDEMIA: ICD-10-CM

## 2020-02-06 ENCOUNTER — OFFICE VISIT - HEALTHEAST (OUTPATIENT)
Dept: INTERNAL MEDICINE | Facility: CLINIC | Age: 80
End: 2020-02-06

## 2020-02-06 DIAGNOSIS — T78.3XXD ANGIOEDEMA, SUBSEQUENT ENCOUNTER: ICD-10-CM

## 2020-02-06 DIAGNOSIS — E78.2 MIXED HYPERLIPIDEMIA: ICD-10-CM

## 2020-02-06 DIAGNOSIS — E66.01 MORBID OBESITY (H): ICD-10-CM

## 2020-02-06 DIAGNOSIS — I50.20 NYHA CLASS 1 HEART FAILURE WITH REDUCED EJECTION FRACTION (H): ICD-10-CM

## 2020-02-06 DIAGNOSIS — I10 ESSENTIAL HYPERTENSION: ICD-10-CM

## 2020-02-06 ASSESSMENT — MIFFLIN-ST. JEOR: SCORE: 1980.39

## 2020-02-10 ENCOUNTER — AMBULATORY - HEALTHEAST (OUTPATIENT)
Dept: CARDIOLOGY | Facility: CLINIC | Age: 80
End: 2020-02-10

## 2020-02-10 DIAGNOSIS — I25.5 ISCHEMIC CARDIOMYOPATHY: ICD-10-CM

## 2020-02-10 DIAGNOSIS — Z95.810 ICD (IMPLANTABLE CARDIOVERTER-DEFIBRILLATOR), SINGLE, IN SITU: ICD-10-CM

## 2020-05-08 ENCOUNTER — OFFICE VISIT - HEALTHEAST (OUTPATIENT)
Dept: INTERNAL MEDICINE | Facility: CLINIC | Age: 80
End: 2020-05-08

## 2020-05-08 DIAGNOSIS — T78.3XXD ANGIOEDEMA, SUBSEQUENT ENCOUNTER: ICD-10-CM

## 2020-05-08 DIAGNOSIS — I10 ESSENTIAL HYPERTENSION: ICD-10-CM

## 2020-05-08 DIAGNOSIS — E66.01 MORBID OBESITY (H): ICD-10-CM

## 2020-05-08 DIAGNOSIS — I25.10 CHRONIC CORONARY ARTERY DISEASE: ICD-10-CM

## 2020-05-08 DIAGNOSIS — I50.20 NYHA CLASS 1 HEART FAILURE WITH REDUCED EJECTION FRACTION (H): ICD-10-CM

## 2020-05-11 ENCOUNTER — AMBULATORY - HEALTHEAST (OUTPATIENT)
Dept: CARDIOLOGY | Facility: CLINIC | Age: 80
End: 2020-05-11

## 2020-05-11 DIAGNOSIS — Z95.810 ICD (IMPLANTABLE CARDIOVERTER-DEFIBRILLATOR), SINGLE, IN SITU: ICD-10-CM

## 2020-05-11 DIAGNOSIS — I25.5 ISCHEMIC CARDIOMYOPATHY: ICD-10-CM

## 2020-06-23 ENCOUNTER — COMMUNICATION - HEALTHEAST (OUTPATIENT)
Dept: INTERNAL MEDICINE | Facility: CLINIC | Age: 80
End: 2020-06-23

## 2020-06-23 ENCOUNTER — OFFICE VISIT - HEALTHEAST (OUTPATIENT)
Dept: INTERNAL MEDICINE | Facility: CLINIC | Age: 80
End: 2020-06-23

## 2020-06-23 DIAGNOSIS — Z66 DNR (DO NOT RESUSCITATE): ICD-10-CM

## 2020-06-23 DIAGNOSIS — Z95.810 ICD (IMPLANTABLE CARDIOVERTER-DEFIBRILLATOR), SINGLE, IN SITU: ICD-10-CM

## 2020-06-23 DIAGNOSIS — G47.33 OSA (OBSTRUCTIVE SLEEP APNEA): ICD-10-CM

## 2020-06-23 DIAGNOSIS — T78.3XXD ANGIOEDEMA, SUBSEQUENT ENCOUNTER: ICD-10-CM

## 2020-06-23 DIAGNOSIS — Z71.89 ADVANCED CARE PLANNING/COUNSELING DISCUSSION: ICD-10-CM

## 2020-06-23 DIAGNOSIS — I25.10 CHRONIC CORONARY ARTERY DISEASE: ICD-10-CM

## 2020-06-23 DIAGNOSIS — I50.20 NYHA CLASS 1 HEART FAILURE WITH REDUCED EJECTION FRACTION (H): ICD-10-CM

## 2020-06-23 DIAGNOSIS — E66.01 MORBID OBESITY (H): ICD-10-CM

## 2020-06-23 DIAGNOSIS — I10 ESSENTIAL HYPERTENSION: ICD-10-CM

## 2020-06-23 DIAGNOSIS — E78.2 MIXED HYPERLIPIDEMIA: ICD-10-CM

## 2020-06-23 DIAGNOSIS — Z00.00 ROUTINE GENERAL MEDICAL EXAMINATION AT A HEALTH CARE FACILITY: ICD-10-CM

## 2020-06-23 LAB
ALBUMIN SERPL-MCNC: 4.2 G/DL (ref 3.5–5)
ALP SERPL-CCNC: 50 U/L (ref 45–120)
ALT SERPL W P-5'-P-CCNC: 22 U/L (ref 0–45)
ANION GAP SERPL CALCULATED.3IONS-SCNC: 8 MMOL/L (ref 5–18)
AST SERPL W P-5'-P-CCNC: 19 U/L (ref 0–40)
BILIRUB SERPL-MCNC: 1.6 MG/DL (ref 0–1)
BUN SERPL-MCNC: 21 MG/DL (ref 8–28)
CALCIUM SERPL-MCNC: 9.3 MG/DL (ref 8.5–10.5)
CHLORIDE BLD-SCNC: 105 MMOL/L (ref 98–107)
CHOLEST SERPL-MCNC: 120 MG/DL
CO2 SERPL-SCNC: 26 MMOL/L (ref 22–31)
CREAT SERPL-MCNC: 0.86 MG/DL (ref 0.7–1.3)
ERYTHROCYTE [DISTWIDTH] IN BLOOD BY AUTOMATED COUNT: 12.5 % (ref 11–14.5)
FASTING STATUS PATIENT QL REPORTED: YES
GFR SERPL CREATININE-BSD FRML MDRD: >60 ML/MIN/1.73M2
GLUCOSE BLD-MCNC: 88 MG/DL (ref 70–125)
HCT VFR BLD AUTO: 47.4 % (ref 40–54)
HDLC SERPL-MCNC: 40 MG/DL
HGB BLD-MCNC: 16.6 G/DL (ref 14–18)
LDLC SERPL CALC-MCNC: 67 MG/DL
MCH RBC QN AUTO: 32.8 PG (ref 27–34)
MCHC RBC AUTO-ENTMCNC: 35.1 G/DL (ref 32–36)
MCV RBC AUTO: 93 FL (ref 80–100)
PLATELET # BLD AUTO: 132 THOU/UL (ref 140–440)
PMV BLD AUTO: 8.8 FL (ref 7–10)
POTASSIUM BLD-SCNC: 3.8 MMOL/L (ref 3.5–5)
PROT SERPL-MCNC: 6.5 G/DL (ref 6–8)
RBC # BLD AUTO: 5.07 MILL/UL (ref 4.4–6.2)
SODIUM SERPL-SCNC: 139 MMOL/L (ref 136–145)
TRIGL SERPL-MCNC: 66 MG/DL
TSH SERPL DL<=0.005 MIU/L-ACNC: 1.53 UIU/ML (ref 0.3–5)
WBC: 6.9 THOU/UL (ref 4–11)

## 2020-06-23 ASSESSMENT — MIFFLIN-ST. JEOR: SCORE: 1989.46

## 2020-08-12 ENCOUNTER — AMBULATORY - HEALTHEAST (OUTPATIENT)
Dept: CARDIOLOGY | Facility: CLINIC | Age: 80
End: 2020-08-12

## 2020-08-12 DIAGNOSIS — I25.5 ISCHEMIC CARDIOMYOPATHY: ICD-10-CM

## 2020-08-12 DIAGNOSIS — Z95.810 ICD (IMPLANTABLE CARDIOVERTER-DEFIBRILLATOR), SINGLE, IN SITU: ICD-10-CM

## 2020-09-29 ENCOUNTER — AMBULATORY - HEALTHEAST (OUTPATIENT)
Dept: NURSING | Facility: CLINIC | Age: 80
End: 2020-09-29

## 2020-11-11 ENCOUNTER — AMBULATORY - HEALTHEAST (OUTPATIENT)
Dept: CARDIOLOGY | Facility: CLINIC | Age: 80
End: 2020-11-11

## 2020-11-11 DIAGNOSIS — Z95.810 ICD (IMPLANTABLE CARDIOVERTER-DEFIBRILLATOR), SINGLE, IN SITU: ICD-10-CM

## 2020-11-11 DIAGNOSIS — I25.5 ISCHEMIC CARDIOMYOPATHY: ICD-10-CM

## 2020-11-13 ENCOUNTER — OFFICE VISIT - HEALTHEAST (OUTPATIENT)
Dept: CARDIOLOGY | Facility: CLINIC | Age: 80
End: 2020-11-13

## 2020-11-13 DIAGNOSIS — I50.22 CHRONIC SYSTOLIC HEART FAILURE (H): ICD-10-CM

## 2020-11-13 DIAGNOSIS — I25.10 CHRONIC CORONARY ARTERY DISEASE: ICD-10-CM

## 2020-11-13 ASSESSMENT — MIFFLIN-ST. JEOR: SCORE: 1996.4

## 2020-11-19 ENCOUNTER — OFFICE VISIT - HEALTHEAST (OUTPATIENT)
Dept: INTERNAL MEDICINE | Facility: CLINIC | Age: 80
End: 2020-11-19

## 2020-11-19 ENCOUNTER — COMMUNICATION - HEALTHEAST (OUTPATIENT)
Dept: INTERNAL MEDICINE | Facility: CLINIC | Age: 80
End: 2020-11-19

## 2020-11-19 DIAGNOSIS — Z20.822 EXPOSURE TO COVID-19 VIRUS: ICD-10-CM

## 2020-11-20 ENCOUNTER — COMMUNICATION - HEALTHEAST (OUTPATIENT)
Dept: INTERNAL MEDICINE | Facility: CLINIC | Age: 80
End: 2020-11-20

## 2020-11-20 DIAGNOSIS — Z20.822 EXPOSURE TO COVID-19 VIRUS: ICD-10-CM

## 2020-11-22 ENCOUNTER — AMBULATORY - HEALTHEAST (OUTPATIENT)
Dept: FAMILY MEDICINE | Facility: CLINIC | Age: 80
End: 2020-11-22

## 2020-11-22 DIAGNOSIS — Z20.822 EXPOSURE TO COVID-19 VIRUS: ICD-10-CM

## 2020-11-24 ENCOUNTER — COMMUNICATION - HEALTHEAST (OUTPATIENT)
Dept: SCHEDULING | Facility: CLINIC | Age: 80
End: 2020-11-24

## 2020-11-24 ENCOUNTER — COMMUNICATION - HEALTHEAST (OUTPATIENT)
Dept: INTERNAL MEDICINE | Facility: CLINIC | Age: 80
End: 2020-11-24

## 2020-11-24 ENCOUNTER — OFFICE VISIT - HEALTHEAST (OUTPATIENT)
Dept: INTERNAL MEDICINE | Facility: CLINIC | Age: 80
End: 2020-11-24

## 2020-11-24 DIAGNOSIS — U07.1 INFECTION DUE TO 2019 NOVEL CORONAVIRUS: ICD-10-CM

## 2020-12-06 ENCOUNTER — COMMUNICATION - HEALTHEAST (OUTPATIENT)
Dept: CARDIOLOGY | Facility: CLINIC | Age: 80
End: 2020-12-06

## 2020-12-06 DIAGNOSIS — I25.10 CHRONIC CORONARY ARTERY DISEASE: ICD-10-CM

## 2020-12-07 RX ORDER — CLOPIDOGREL BISULFATE 75 MG/1
TABLET ORAL
Qty: 90 TABLET | Refills: 2 | Status: SHIPPED | OUTPATIENT
Start: 2020-12-07 | End: 2021-08-12

## 2020-12-10 ENCOUNTER — COMMUNICATION - HEALTHEAST (OUTPATIENT)
Dept: INTERNAL MEDICINE | Facility: CLINIC | Age: 80
End: 2020-12-10

## 2020-12-10 DIAGNOSIS — I10 ESSENTIAL HYPERTENSION: ICD-10-CM

## 2020-12-10 DIAGNOSIS — E78.2 MIXED HYPERLIPIDEMIA: ICD-10-CM

## 2020-12-11 RX ORDER — HYDROCHLOROTHIAZIDE 25 MG/1
TABLET ORAL
Qty: 90 TABLET | Refills: 1 | Status: SHIPPED | OUTPATIENT
Start: 2020-12-11 | End: 2021-08-16

## 2020-12-11 RX ORDER — SIMVASTATIN 40 MG
TABLET ORAL
Qty: 90 TABLET | Refills: 3 | Status: SHIPPED | OUTPATIENT
Start: 2020-12-11 | End: 2021-10-04

## 2020-12-17 ENCOUNTER — COMMUNICATION - HEALTHEAST (OUTPATIENT)
Dept: INTERNAL MEDICINE | Facility: CLINIC | Age: 80
End: 2020-12-17

## 2020-12-17 DIAGNOSIS — I10 ESSENTIAL HYPERTENSION: ICD-10-CM

## 2020-12-20 RX ORDER — METOPROLOL SUCCINATE 100 MG/1
TABLET, EXTENDED RELEASE ORAL
Qty: 90 TABLET | Refills: 3 | Status: SHIPPED | OUTPATIENT
Start: 2020-12-20 | End: 2021-10-04

## 2021-01-18 ENCOUNTER — OFFICE VISIT - HEALTHEAST (OUTPATIENT)
Dept: INTERNAL MEDICINE | Facility: CLINIC | Age: 81
End: 2021-01-18

## 2021-01-18 DIAGNOSIS — I25.10 CHRONIC CORONARY ARTERY DISEASE: ICD-10-CM

## 2021-01-18 DIAGNOSIS — I10 ESSENTIAL HYPERTENSION: ICD-10-CM

## 2021-01-18 DIAGNOSIS — G62.9 POLYNEUROPATHY: ICD-10-CM

## 2021-01-18 DIAGNOSIS — E78.2 MIXED HYPERLIPIDEMIA: ICD-10-CM

## 2021-01-18 DIAGNOSIS — T78.3XXD ANGIOEDEMA, SUBSEQUENT ENCOUNTER: ICD-10-CM

## 2021-01-18 DIAGNOSIS — I50.22 CHRONIC SYSTOLIC HEART FAILURE (H): ICD-10-CM

## 2021-01-18 DIAGNOSIS — E66.01 MORBID OBESITY (H): ICD-10-CM

## 2021-01-18 LAB
ANION GAP SERPL CALCULATED.3IONS-SCNC: 12 MMOL/L (ref 5–18)
BUN SERPL-MCNC: 17 MG/DL (ref 8–28)
CALCIUM SERPL-MCNC: 9 MG/DL (ref 8.5–10.5)
CHLORIDE BLD-SCNC: 104 MMOL/L (ref 98–107)
CO2 SERPL-SCNC: 24 MMOL/L (ref 22–31)
CREAT SERPL-MCNC: 0.81 MG/DL (ref 0.7–1.3)
GFR SERPL CREATININE-BSD FRML MDRD: >60 ML/MIN/1.73M2
GLUCOSE BLD-MCNC: 95 MG/DL (ref 70–125)
POTASSIUM BLD-SCNC: 3.5 MMOL/L (ref 3.5–5)
SODIUM SERPL-SCNC: 140 MMOL/L (ref 136–145)

## 2021-01-18 ASSESSMENT — MIFFLIN-ST. JEOR: SCORE: 1949.49

## 2021-01-19 LAB — VIT B12 SERPL-MCNC: 323 PG/ML (ref 213–816)

## 2021-03-05 ENCOUNTER — COMMUNICATION - HEALTHEAST (OUTPATIENT)
Dept: CARDIOLOGY | Facility: CLINIC | Age: 81
End: 2021-03-05

## 2021-04-05 ENCOUNTER — OFFICE VISIT - HEALTHEAST (OUTPATIENT)
Dept: INTERNAL MEDICINE | Facility: CLINIC | Age: 81
End: 2021-04-05

## 2021-04-05 ENCOUNTER — COMMUNICATION - HEALTHEAST (OUTPATIENT)
Dept: INTERNAL MEDICINE | Facility: CLINIC | Age: 81
End: 2021-04-05

## 2021-04-05 DIAGNOSIS — I25.10 CHRONIC CORONARY ARTERY DISEASE: ICD-10-CM

## 2021-04-05 DIAGNOSIS — G47.33 OSA (OBSTRUCTIVE SLEEP APNEA): ICD-10-CM

## 2021-04-05 DIAGNOSIS — T78.3XXD ANGIOEDEMA, SUBSEQUENT ENCOUNTER: ICD-10-CM

## 2021-04-05 DIAGNOSIS — I50.22 CHRONIC SYSTOLIC HEART FAILURE (H): ICD-10-CM

## 2021-04-05 DIAGNOSIS — E66.01 MORBID OBESITY (H): ICD-10-CM

## 2021-04-05 LAB
ALBUMIN SERPL-MCNC: 4.3 G/DL (ref 3.5–5)
ALP SERPL-CCNC: 51 U/L (ref 45–120)
ALT SERPL W P-5'-P-CCNC: 19 U/L (ref 0–45)
ANION GAP SERPL CALCULATED.3IONS-SCNC: 7 MMOL/L (ref 5–18)
AST SERPL W P-5'-P-CCNC: 20 U/L (ref 0–40)
BILIRUB SERPL-MCNC: 1.8 MG/DL (ref 0–1)
BUN SERPL-MCNC: 21 MG/DL (ref 8–28)
CALCIUM SERPL-MCNC: 9.1 MG/DL (ref 8.5–10.5)
CHLORIDE BLD-SCNC: 103 MMOL/L (ref 98–107)
CHOLEST SERPL-MCNC: 126 MG/DL
CO2 SERPL-SCNC: 29 MMOL/L (ref 22–31)
CREAT SERPL-MCNC: 0.96 MG/DL (ref 0.7–1.3)
ERYTHROCYTE [DISTWIDTH] IN BLOOD BY AUTOMATED COUNT: 13.9 % (ref 11–14.5)
FASTING STATUS PATIENT QL REPORTED: YES
GFR SERPL CREATININE-BSD FRML MDRD: >60 ML/MIN/1.73M2
GLUCOSE BLD-MCNC: 91 MG/DL (ref 70–125)
HCT VFR BLD AUTO: 50 % (ref 40–54)
HDLC SERPL-MCNC: 42 MG/DL
HGB BLD-MCNC: 16.5 G/DL (ref 14–18)
LDLC SERPL CALC-MCNC: 70 MG/DL
MCH RBC QN AUTO: 31 PG (ref 27–34)
MCHC RBC AUTO-ENTMCNC: 33 G/DL (ref 32–36)
MCV RBC AUTO: 94 FL (ref 80–100)
PLATELET # BLD AUTO: 136 THOU/UL (ref 140–440)
PMV BLD AUTO: 10.3 FL (ref 7–10)
POTASSIUM BLD-SCNC: 4.2 MMOL/L (ref 3.5–5)
PROT SERPL-MCNC: 6.6 G/DL (ref 6–8)
RBC # BLD AUTO: 5.33 MILL/UL (ref 4.4–6.2)
SODIUM SERPL-SCNC: 139 MMOL/L (ref 136–145)
TRIGL SERPL-MCNC: 72 MG/DL
WBC: 6.5 THOU/UL (ref 4–11)

## 2021-04-05 ASSESSMENT — MIFFLIN-ST. JEOR: SCORE: 1950.91

## 2021-04-07 ENCOUNTER — AMBULATORY - HEALTHEAST (OUTPATIENT)
Dept: NURSING | Facility: CLINIC | Age: 81
End: 2021-04-07

## 2021-04-12 ENCOUNTER — COMMUNICATION - HEALTHEAST (OUTPATIENT)
Dept: TELEHEALTH | Facility: CLINIC | Age: 81
End: 2021-04-12

## 2021-04-15 ENCOUNTER — OFFICE VISIT - HEALTHEAST (OUTPATIENT)
Dept: INTERNAL MEDICINE | Facility: CLINIC | Age: 81
End: 2021-04-15

## 2021-04-15 DIAGNOSIS — I10 ESSENTIAL HYPERTENSION: ICD-10-CM

## 2021-04-15 DIAGNOSIS — T78.3XXD ANGIOEDEMA, SUBSEQUENT ENCOUNTER: ICD-10-CM

## 2021-04-15 RX ORDER — PREDNISONE 20 MG/1
40 TABLET ORAL DAILY PRN
Qty: 6 TABLET | Refills: 11 | Status: SHIPPED | OUTPATIENT
Start: 2021-04-15 | End: 2021-10-04

## 2021-04-15 ASSESSMENT — MIFFLIN-ST. JEOR: SCORE: 1962.25

## 2021-04-22 ENCOUNTER — COMMUNICATION - HEALTHEAST (OUTPATIENT)
Dept: INTERNAL MEDICINE | Facility: CLINIC | Age: 81
End: 2021-04-22

## 2021-04-28 ENCOUNTER — AMBULATORY - HEALTHEAST (OUTPATIENT)
Dept: NURSING | Facility: CLINIC | Age: 81
End: 2021-04-28

## 2021-05-28 NOTE — PROGRESS NOTES
Office Visit - Follow Up   Chaim Dee   79 y.o. male    Date of Visit: 4/30/2019    Chief Complaint   Patient presents with     Hypertension        Assessment and Plan   1. NYHA class 1 ischemic heart failure with reduced ejection fraction, EF 35-40%, s/p ICD  Symptoms well controlled, blood pressure is lower with weight loss, mildly symptomatic, decrease metoprolol to 100 mg daily, continue hydrochlorothiazide, aspirin, statin  - Basic Metabolic Panel    2. Essential hypertension  Controlled as above  - metoprolol succinate (TOPROL-XL) 100 MG 24 hr tablet; Take 1 tablet (100 mg total) by mouth daily.    3. Mixed hyperlipidemia  Tinea statin    4. Gastroesophageal reflux disease without esophagitis  Continue H2 blocker, available over-the-counter    5. Angioedema, initial encounter  Numerous allergies, suggest daily antihistamine, Benadryl as needed    6. Primary osteoarthritis of right knee  Acetaminophen    7. Morbid obesity (H)  The following high BMI interventions were performed this visit: encouragement to exercise and lifestyle education regarding diet    Return in about 6 months (around 10/30/2019) for recheck.     History of Present Illness   This 79 y.o. old, brother Rich, comes in for follow-up.  Overall doing well.  Some urticaria in April resolved with Benadryl and warm coffee.  Right knee is been bothering him but is relieved with acetaminophen.  A bit of lightheadedness and fatigue with weight loss and what he attributes to his blood pressure medication.  He has not fallen.  No chest pain or shortness of breath.  Acid reflux controlled on famotidine, insurance will not cover.  He did not start Allegra.    Review of Systems: A comprehensive review of systems was negative except as noted.     Medications, Allergies and Problem List   Reviewed, reconciled and updated     Physical Exam   General Appearance:   No acute distress    /68 (Patient Site: Left Arm, Patient Position: Sitting, Cuff  Size: Adult Regular)   Pulse 67   Ht 6' (1.829 m)   Wt (!) 270 lb (122.5 kg)   SpO2 95%   BMI 36.62 kg/m      HEENT exam is unremarkable  Neck supple no thyromegaly or nodule palpable  Lymphatic no cervical lymphadenopathy  Cardiovascular regular rate and rhythm no murmur gallop or rub  Pulmonary lungs are clear to auscultation bilaterally  Gastrointestinall abdomen soft nontender nondistended no organomegaly  Neurologic exam is non focal  Psychiatric pleasant, no confusion or agitation        Additional Information   Current Outpatient Medications   Medication Sig Dispense Refill     acetaminophen (TYLENOL) 325 MG tablet Take 2 tablets (650 mg total) by mouth every 4 (four) hours as needed.  0     aspirin 81 MG EC tablet Take 81 mg by mouth at bedtime.        diphenhydrAMINE (BENADRYL) 25 mg tablet Take 25 mg by mouth as needed for sleep (tongue swelling).       famotidine (PEPCID) 20 MG tablet Take 1 tablet (20 mg total) by mouth 2 (two) times a day. 180 tablet 2     fexofenadine (ALLEGRA) 60 MG tablet Take 1 tablet (60 mg total) by mouth 2 (two) times a day. 180 tablet 3     hydroCHLOROthiazide (HYDRODIURIL) 25 MG tablet TAKE 1 TABLET DAILY (DUE FOR CLINIC VISIT PRIOR TO FURTHER REFILLS) 90 tablet 2     metoprolol succinate (TOPROL-XL) 100 MG 24 hr tablet Take 1 tablet (100 mg total) by mouth daily.       nitroglycerin (NITROSTAT) 0.4 MG SL tablet Place 0.4 mg under the tongue as needed. Use as directed.       simvastatin (ZOCOR) 40 MG tablet TAKE 1 TABLET AT BEDTIME 90 tablet 2     No current facility-administered medications for this visit.      Allergies   Allergen Reactions     Ace Inhibitors Angioedema     Arb-Angiotensin Receptor Antagonist Angioedema     Gabapentin Angioedema     Losartan Angioedema     Ramipril Angioedema     Seafood Nausea And Vomiting     Shellfish Containing Products Nausea And Vomiting     SHRIMP- worst reaction to.     Shrimp Nausea And Vomiting     Social History     Tobacco  Use     Smoking status: Former Smoker     Smokeless tobacco: Never Used   Substance Use Topics     Alcohol use: Yes     Alcohol/week: 0.6 oz     Types: 1 Glasses of wine per week     Comment: rare - only in [place]     Drug use: No       Review and/or order of clinical lab tests:  Review and/or order of radiology tests:  Review and/or order of medicine tests:  Discussion of test results with performing physician:  Decision to obtain old records and/or obtain history from someone other than the patient:  Review and summarization of old records and/or obtaining history from someone other than the patient and.or discussion of case with another health care provider:  Independent visualization of image, tracing or specimen itself:    Time:      Luis Elena MD

## 2021-05-29 NOTE — PROGRESS NOTES
Office Visit - Follow Up   Chaim Dee   79 y.o. male    Date of Visit: 6/4/2019    Chief Complaint   Patient presents with     Follow-up     Hospital follow up        Assessment and Plan   1. Angioedema, subsequent encounter  Etiology uncertain.  Does not appear to be related to ACE or R abuse.  I have asked him to stop aspirin.  He is also now on Allegra and also was taking Pepcid.  If he has recurrence of urticaria or angioedema than I think we can resume aspirin as it is likely not the cause.  If he has no recurrence then we will need to consider aspirin as a possibility.  At that point I think we would have Dr. Von Esquivel and Dr. Bucky Cevallos assist with best plan of care.    2. Urticaria  As above    3. NYHA class 1 ischemic heart failure with reduced ejection fraction, EF 35-40%, s/p ICD  Stable as above    4. Essential hypertension  Pressure controlled    5. Chronic coronary artery disease  As above    Return in about 2 months (around 8/4/2019) for recheck.     History of Present Illness   This 79 y.o. old man comes in for post hospital follow-up.  He is admitted with angioedema and urticaria.  He was treated with prednisone and antihistamines and had resolution of tongue and lip swelling and hives.  He is now finishing prednisone.  He is now on Allegra and also takes Pepcid.  He had not been taking Allegra prior to admission.  I asked him to stop aspirin at discharge.  He has not had any recurrence.  He usually gets recurrence every 10 to 30 days.  No recent anginal symptoms and no recent acute coronary syndrome.    Review of Systems: A comprehensive review of systems was negative except as noted.     Medications, Allergies and Problem List   Reviewed, reconciled and updated     Physical Exam   General Appearance:   No acute distress    /70 (Patient Site: Right Arm, Patient Position: Sitting)   Pulse 80   Ht 6' (1.829 m)   Wt (!) 270 lb (122.5 kg)   SpO2 95%   BMI 36.62 kg/m       HEENT exam is unremarkable  Neck supple no thyromegaly or nodule palpable  Lymphatic no cervical lymphadenopathy  Cardiovascular regular rate and rhythm no murmur gallop or rub  Pulmonary lungs are clear to auscultation bilaterally  Gastrointestinall abdomen soft nontender nondistended no organomegaly  Neurologic exam is non focal  Psychiatric pleasant, no confusion or agitation        Additional Information   Current Outpatient Medications   Medication Sig Dispense Refill     acetaminophen (TYLENOL) 325 MG tablet Take 2 tablets (650 mg total) by mouth every 4 (four) hours as needed.  0     diphenhydrAMINE (BENADRYL) 25 mg tablet Take 25-50 mg by mouth every 6 (six) hours as needed.              famotidine (PEPCID) 20 MG tablet Take 1 tablet (20 mg total) by mouth 2 (two) times a day. 180 tablet 2     fexofenadine (ALLEGRA) 60 MG tablet Take 1 tablet (60 mg total) by mouth 2 (two) times a day. 180 tablet 3     hydroCHLOROthiazide (HYDRODIURIL) 25 MG tablet TAKE 1 TABLET DAILY (DUE FOR CLINIC VISIT PRIOR TO FURTHER REFILLS) 90 tablet 2     metoprolol succinate (TOPROL-XL) 100 MG 24 hr tablet Take 1 tablet (100 mg total) by mouth daily.       nitroglycerin (NITROSTAT) 0.4 MG SL tablet Place 1 tablet (0.4 mg total) under the tongue as needed. Use as directed. 30 tablet 0     predniSONE (DELTASONE) 20 MG tablet Take 20 mg by mouth daily for 5 days. 5 tablet 0     simvastatin (ZOCOR) 40 MG tablet TAKE 1 TABLET AT BEDTIME 90 tablet 2     No current facility-administered medications for this visit.      Allergies   Allergen Reactions     Ace Inhibitors Angioedema     Arb-Angiotensin Receptor Antagonist Angioedema     Gabapentin Angioedema     Losartan Angioedema     Ramipril Angioedema     Seafood Nausea And Vomiting     Shellfish Containing Products Nausea And Vomiting     SHRIMP- worst reaction to.     Social History     Tobacco Use     Smoking status: Former Smoker     Smokeless tobacco: Never Used   Substance Use  Topics     Alcohol use: Yes     Alcohol/week: 0.6 oz     Types: 1 Glasses of wine per week     Comment: rare - only in [place]     Drug use: No       Review and/or order of clinical lab tests:  Review and/or order of radiology tests:  Review and/or order of medicine tests:  Discussion of test results with performing physician:  Decision to obtain old records and/or obtain history from someone other than the patient:  Review and summarization of old records and/or obtaining history from someone other than the patient and.or discussion of case with another health care provider:  Independent visualization of image, tracing or specimen itself:    Time:      Luis Elena MD

## 2021-05-29 NOTE — PROGRESS NOTES
"TCM DISCHARGE FOLLOW UP CALL    Discharge Date:  5/30/2019  Reason for hospital stay (discharge diagnosis)::  Angioedema  Are you feeling better, the same or worse since your discharge?:  Patient is feeling better (Denies difficulty breathing, rash, swelling.  States still has cold sx, \"irritating hacking,\" but sx have improved in the last day or two.)  Do you feel like you have a plan in the event of a health emergency?: Yes (Call 911)    As part of your discharge plan, were  home care services ordered for you?: No    Did you receive any new medications, or was there a change to your medications?: Yes    Are you taking those medications, or do you have any established regiment?:  RN reviewed discharge medications w/pt.  Pt states he is taking prednisone once a day, and has stopped ASA 81 mg as instructed.  He has an EpiPen to use PRN  Do you have any follow up visits scheduled with your PCP or Specialist?:  Yes, with PCP (Dr. Elena 6/4/19)  (RN) Is PCP appt scheduled soon enough (within 14 days of discharge date)?: Yes        Tania Werner RN Care Manager, Population Health    "

## 2021-05-30 VITALS — BODY MASS INDEX: 42.66 KG/M2 | HEIGHT: 72 IN | WEIGHT: 315 LBS

## 2021-05-30 VITALS — HEIGHT: 72 IN | BODY MASS INDEX: 42.53 KG/M2 | WEIGHT: 314 LBS

## 2021-05-31 VITALS — WEIGHT: 315 LBS | HEIGHT: 72 IN | BODY MASS INDEX: 42.66 KG/M2

## 2021-05-31 VITALS — WEIGHT: 315 LBS | BODY MASS INDEX: 42.66 KG/M2 | HEIGHT: 72 IN

## 2021-05-31 VITALS — BODY MASS INDEX: 42.66 KG/M2 | WEIGHT: 315 LBS | HEIGHT: 72 IN

## 2021-05-31 NOTE — PROGRESS NOTES
Office Visit - Follow Up   Chaim Dee   79 y.o. male    Date of Visit: 8/6/2019    Chief Complaint   Patient presents with     Blood Pressure Check        Assessment and Plan   1. Urticaria / Angioedema, subsequent encounter  Angioedema is improved.  We made 2 changes at his hospital discharge.  We stopped aspirin and we started fexofenadine 60 mg twice daily.  I would like him to resume aspirin and see if angioedema returns.  If so, we will need to consider using clopidogrel as antiplatelet agent.  Additionally, the twice daily dosing of fexofenadine is actually much more expensive than the long-acting formulation and we will try making this change.  He will finish off what he has so as not to confound the initiation of aspirin.  He has about 3 weeks left.  - fexofenadine (ALLEGRA) 180 MG tablet; Take 1 tablet (180 mg total) by mouth daily.  Dispense: 90 tablet; Refill: 3    2. Chronic coronary artery disease  Stable, see discussion above  - aspirin 81 MG EC tablet; Take 1 tablet (81 mg total) by mouth daily.; Refill: 0    4. NYHA class 1 ischemic heart failure with reduced ejection fraction, EF 35-40%, s/p ICD  Stable    5. Mixed hyperlipidemia  Table continue statin    6. Essential hypertension  Blood pressure looks okay, excellent    7. Morbid obesity (H)  The following high BMI interventions were performed this visit: encouragement to exercise and lifestyle education regarding diet    Return in about 3 months (around 11/6/2019) for recheck.     History of Present Illness   This 79 y.o. old man comes in for follow-up.  Since his recent hospitalization for angioedema he has not had any episodes now for 2 months.  He stopped aspirin and started taking fexofenadine 60 mg twice daily.  He wonders if he needs to stay off of aspirin or whether he can resume this.  He has gained a little bit of weight and had a few celebrations this summer and plans to try and lose 5 to 10 pounds.  No chest pain or shortness  of breath.    Review of Systems: A comprehensive review of systems was negative except as noted.     Medications, Allergies and Problem List   Reviewed, reconciled and updated  Post Discharge Medication Reconciliation Status:      Physical Exam   General Appearance:   No acute distress    /70 (Patient Site: Right Arm, Patient Position: Sitting, Cuff Size: Adult Regular)   Pulse 89   Ht 6' (1.829 m)   Wt (!) 273 lb (123.8 kg)   SpO2 95%   BMI 37.03 kg/m      HEENT exam is unremarkable  Neck supple no thyromegaly or nodule palpable  Lymphatic no cervical lymphadenopathy  Cardiovascular regular rate and rhythm no murmur gallop or rub  Pulmonary lungs are clear to auscultation bilaterally  Gastrointestinal abdomen soft nontender nondistended no organomegaly  Neurologic exam is non focal  Psychiatric pleasant, no confusion or agitation        Additional Information   Current Outpatient Medications   Medication Sig Dispense Refill     acetaminophen (TYLENOL) 325 MG tablet Take 2 tablets (650 mg total) by mouth every 4 (four) hours as needed.  0     diphenhydrAMINE (BENADRYL) 25 mg tablet Take 25-50 mg by mouth every 6 (six) hours as needed.              famotidine (PEPCID) 20 MG tablet Take 1 tablet (20 mg total) by mouth 2 (two) times a day. 180 tablet 2     hydroCHLOROthiazide (HYDRODIURIL) 25 MG tablet TAKE 1 TABLET DAILY (DUE FOR CLINIC VISIT PRIOR TO FURTHER REFILLS) 90 tablet 2     metoprolol succinate (TOPROL-XL) 100 MG 24 hr tablet Take 1 tablet (100 mg total) by mouth daily.       nitroglycerin (NITROSTAT) 0.4 MG SL tablet Place 1 tablet (0.4 mg total) under the tongue as needed. Use as directed. 30 tablet 0     simvastatin (ZOCOR) 40 MG tablet TAKE 1 TABLET AT BEDTIME 90 tablet 2     aspirin 81 MG EC tablet Take 1 tablet (81 mg total) by mouth daily.  0     fexofenadine (ALLEGRA) 180 MG tablet Take 1 tablet (180 mg total) by mouth daily. 90 tablet 3     No current facility-administered medications for  this visit.      Allergies   Allergen Reactions     Ace Inhibitors Angioedema     Arb-Angiotensin Receptor Antagonist Angioedema     Gabapentin Angioedema     Losartan Angioedema     Ramipril Angioedema     Seafood Nausea And Vomiting     Shellfish Containing Products Nausea And Vomiting     SHRIMP- worst reaction to.     Social History     Tobacco Use     Smoking status: Former Smoker     Smokeless tobacco: Never Used   Substance Use Topics     Alcohol use: Yes     Alcohol/week: 0.6 oz     Types: 1 Glasses of wine per week     Comment: rare - only in [place]     Drug use: No       Review and/or order of clinical lab tests:  Review and/or order of radiology tests:  Review and/or order of medicine tests:  Discussion of test results with performing physician:  Decision to obtain old records and/or obtain history from someone other than the patient:  Review and summarization of old records and/or obtaining history from someone other than the patient and.or discussion of case with another health care provider:  Independent visualization of image, tracing or specimen itself:    Time:      Luis Elena MD

## 2021-06-01 VITALS — WEIGHT: 306 LBS | HEIGHT: 72 IN | BODY MASS INDEX: 41.45 KG/M2

## 2021-06-01 VITALS — WEIGHT: 306.5 LBS | BODY MASS INDEX: 41.52 KG/M2 | HEIGHT: 72 IN

## 2021-06-01 VITALS — HEIGHT: 72 IN | WEIGHT: 287 LBS | BODY MASS INDEX: 38.87 KG/M2

## 2021-06-01 VITALS — WEIGHT: 289 LBS | BODY MASS INDEX: 39.14 KG/M2 | HEIGHT: 72 IN

## 2021-06-01 VITALS — BODY MASS INDEX: 38.87 KG/M2 | WEIGHT: 287 LBS | HEIGHT: 72 IN

## 2021-06-01 VITALS — BODY MASS INDEX: 38.6 KG/M2 | HEIGHT: 72 IN | WEIGHT: 285 LBS

## 2021-06-01 VITALS — BODY MASS INDEX: 43.13 KG/M2 | WEIGHT: 315 LBS

## 2021-06-01 NOTE — TELEPHONE ENCOUNTER
Refill Approved    Rx renewed per Medication Renewal Policy. Medication was last renewed on 9/6/18.    Latanya Rahman, [facility] Triage/Med Refill 9/6/2019     Requested Prescriptions   Pending Prescriptions Disp Refills     famotidine (PEPCID) 20 MG tablet [Pharmacy Med Name: FAMOTIDINE TABS 20MG] 60 tablet 12     Sig: TAKE 1 TABLET TWICE A DAY       GI Medications Refill Protocol Passed - 9/6/2019  9:50 AM        Passed - PCP or prescribing provider visit in last 12 or next 3 months.     Last office visit with prescriber/PCP: 8/6/2019 Luis Elena MD OR same dept: 8/6/2019 Luis Elena MD OR same specialty: 8/6/2019 Luis Elena MD  Last physical: Visit date not found Last MTM visit: Visit date not found   Next visit within 3 mo: Visit date not found  Next physical within 3 mo: Visit date not found  Prescriber OR PCP: Luis Elena MD  Last diagnosis associated with med order: 1. Gastroesophageal reflux disease without esophagitis  - famotidine (PEPCID) 20 MG tablet [Pharmacy Med Name: FAMOTIDINE TABS 20MG]; TAKE 1 TABLET TWICE A DAY  Dispense: 60 tablet; Refill: 12    If protocol passes may refill for 12 months if within 3 months of last provider visit (or a total of 15 months).

## 2021-06-02 VITALS — HEIGHT: 72 IN | WEIGHT: 269.06 LBS | BODY MASS INDEX: 36.44 KG/M2

## 2021-06-02 VITALS — WEIGHT: 272.4 LBS | BODY MASS INDEX: 36.9 KG/M2 | HEIGHT: 72 IN

## 2021-06-02 VITALS — BODY MASS INDEX: 37.38 KG/M2 | HEIGHT: 72 IN | WEIGHT: 276 LBS

## 2021-06-02 VITALS — WEIGHT: 275 LBS | BODY MASS INDEX: 37.25 KG/M2 | HEIGHT: 72 IN

## 2021-06-02 VITALS — WEIGHT: 272 LBS | HEIGHT: 72 IN | BODY MASS INDEX: 36.84 KG/M2

## 2021-06-03 VITALS — BODY MASS INDEX: 36.98 KG/M2 | WEIGHT: 273 LBS | HEIGHT: 72 IN

## 2021-06-03 VITALS — WEIGHT: 270 LBS | BODY MASS INDEX: 36.57 KG/M2 | HEIGHT: 72 IN

## 2021-06-03 VITALS
WEIGHT: 273 LBS | RESPIRATION RATE: 16 BRPM | DIASTOLIC BLOOD PRESSURE: 78 MMHG | HEIGHT: 72 IN | SYSTOLIC BLOOD PRESSURE: 120 MMHG | BODY MASS INDEX: 36.98 KG/M2 | HEART RATE: 80 BPM

## 2021-06-03 VITALS
HEART RATE: 77 BPM | OXYGEN SATURATION: 99 % | WEIGHT: 273 LBS | DIASTOLIC BLOOD PRESSURE: 74 MMHG | HEIGHT: 72 IN | BODY MASS INDEX: 36.98 KG/M2 | SYSTOLIC BLOOD PRESSURE: 124 MMHG

## 2021-06-03 VITALS — HEIGHT: 72 IN | WEIGHT: 270 LBS | BODY MASS INDEX: 36.57 KG/M2

## 2021-06-03 NOTE — PATIENT INSTRUCTIONS - HE
Chaim Dee,    It was a pleasure to see you today at the [facility].     My recommendations after this visit include:    Start clopidrogel    IRIS Cevallos MD, FACC, [place]

## 2021-06-03 NOTE — PROGRESS NOTES
Cardiology Progress Note    Assessment:  Coronary artery disease with remote history of anterior MI and stenting of LAD and circumflex in 1999, no angina  Ischemic cardiomyopathy with mildly depressed LV systolic function well compensated, no fluid overload  Recurrent angioedema with tongue swelling secondary to ACE inhibitor and ARB,?  Aspirin  AICD, normal function  Chronic systolic heart failure, stable  Hypercholesterolemia on simvastatin, good control  Hypertension, good control  Obesity      Plan:  He has redeveloped angioedema even though he has been off aspirin for almost 2 months.  I am not sure if we can still blame aspirin for angioedema.  The allergy specialist was saying that the one can have angioedema for several months after stopping ACE inhibitor and ARB.  I do not think we need to put him back on aspirin as we have good alternative and.  I do think he should be on antiplatelet agent.  I will give him clopidogrel 75 mg a day.    Routine follow-up in 1 year    Subjective:   This is 79 y.o. male who comes in today for follow-up visit.  He reports no chest pain or shortness of breath.  He is remains physically active.  He has had angioedema of tongue on and off this summer.  His primary physician stopped aspirin 2 months ago.  Initially he had resolution it and edema but it returned later on.    Review of Systems:   General: WNL  Eyes: WNL  Ears/Nose/Throat: WNL  Lungs: WNL  Heart: WNL  Stomach: WNL  Bladder: WNL  Muscle/Joints: WNL  Skin: WNL  Nervous System: WNL  Mental Health: WNL     Blood: WNL    Objective:   /78 (Patient Site: Left Arm, Patient Position: Sitting, Cuff Size: Adult Large)   Pulse 80   Resp 16   Ht 6' (1.829 m)   Wt (!) 273 lb (123.8 kg)   BMI 37.03 kg/m    Physical Exam:  GENERAL: no distress  NECK: No JVD  LUNGS: Clear to auscultation.  CARDIAC: regular rhythm, S1 & S2 normal.  No heaves, thrills, gallops or murmurs.  ABDOMEN: flat, negative hepatosplenomegaly, soft and  non-tender.  EXTREMITIES: No evidence of cyanosis, clubbing or edema.    Current Outpatient Medications   Medication Sig Dispense Refill     acetaminophen (TYLENOL) 325 MG tablet Take 2 tablets (650 mg total) by mouth every 4 (four) hours as needed.  0     diphenhydrAMINE (BENADRYL) 25 mg tablet Take 25-50 mg by mouth every 6 (six) hours as needed.              famotidine (PEPCID) 20 MG tablet TAKE 1 TABLET TWICE A  tablet 3     hydroCHLOROthiazide (HYDRODIURIL) 25 MG tablet TAKE 1 TABLET DAILY (DUE FOR CLINIC VISIT PRIOR TO FURTHER REFILLS) 90 tablet 2     metoprolol succinate (TOPROL-XL) 100 MG 24 hr tablet Take 1 tablet (100 mg total) by mouth daily.       nitroglycerin (NITROSTAT) 0.4 MG SL tablet Place 1 tablet (0.4 mg total) under the tongue as needed. Use as directed. 30 tablet 0     simvastatin (ZOCOR) 40 MG tablet TAKE 1 TABLET AT BEDTIME 90 tablet 2     clopidogrel (PLAVIX) 75 mg tablet Take 1 tablet (75 mg total) by mouth daily. 30 tablet 12     fexofenadine (ALLEGRA) 180 MG tablet Take 1 tablet (180 mg total) by mouth daily. 90 tablet 3     No current facility-administered medications for this visit.        Cardiographics:    Device interrogation: Normal function, no arrhythmias, no fluid overload    Echo: May 2018    Normal right ventricular size and systolic function.    Left ventricle ejection fraction is mildly decreased. The estimated left ventricular ejection fraction is 50%.    Distal septal and apical hypokinesis    No hemodynamically significant valvular heart abnormalities.    Lab Results:   Results from last 7 days   Lab Units 11/06/19  0917   LN-SODIUM mmol/L 138   LN-POTASSIUM mmol/L 3.7   LN-CHLORIDE mmol/L 102   LN-CO2 mmol/L 26   LN-BLOOD UREA NITROGEN mg/dL 18   LN-CREATININE mg/dL 0.91   LN-CALCIUM mg/dL 9.4     Lab Results   Component Value Date    CHOL 116 11/06/2019    CHOL 125 01/17/2019    CHOL 126 10/17/2018     Lab Results   Component Value Date    HDL 35 (L) 11/06/2019     HDL 38 (L) 01/17/2019    HDL 36 (L) 10/17/2018     Lab Results   Component Value Date    LDLCALC 62 11/06/2019    LDLCALC 67 01/17/2019    LDLCALC 72 10/17/2018     Lab Results   Component Value Date    TRIG 95 11/06/2019    TRIG 100 01/17/2019    TRIG 89 10/17/2018     No results found for: BNP    Eddie (Bucky)  MD Mart

## 2021-06-03 NOTE — PROGRESS NOTES
In clinic device check with Device RN and follow-up with Dr. Cevallos.  Please see link for full device report.  Patient was informed of results and next follow up during today's visit.

## 2021-06-03 NOTE — PROGRESS NOTES
Office Visit - Follow Up   Chaim Dee   79 y.o. male    Date of Visit: 11/6/2019    Chief Complaint   Patient presents with     Hypertension     Medication Questions     aspirin        Assessment and Plan   1. Angioedema, subsequent encounter  Probably multifactorial but does not appear to be bradykinin mediated.  Response to antihistamines.  Has improved since stopping ACE inhibitor and ARB but still persistent.  Stopped aspirin and started Allegra and symptoms resolved for 1-1/2 months.  Angioedema promptly resumed after restarting aspirin.  I think we have enough evidence to have him avoid aspirin altogether.  I did ask him to see Dr. Bucky Cevallos regarding initiation of other antiplatelet medications such as clopidogrel or if we should follow him off antiplatelet medication given that his interventions were many decades ago and he is currently asymptomatic and as he gets older his bleeding risk will increase.  He will resume fexofenadine 180 mg daily    2. Aspirin allergy  As above    3. Chronic coronary artery disease  As above  - HM2(CBC w/o Differential)  - Basic Metabolic Panel  - Lipid Cascade    4. Essential hypertension  Blood pressure controlled continue current medication    5. Mixed hyperlipidemia  Continue statin    6. Need for prophylactic vaccination and inoculation against influenza  - Influenza High Dose, Seasonal 65+ yrs    7. NYHA class 1 ischemic heart failure with reduced ejection fraction, EF 35-40%, s/p ICD  Appears euvolemic    8. Morbid obesity (H)  The following high BMI interventions were performed this visit: encouragement to exercise and lifestyle education regarding diet    Return in about 3 months (around 2/6/2020) for recheck.     History of Present Illness   This 79 y.o. old man comes in for follow-up.  Last visit he had had 1-1/2 months without any angioedema.  It discharged from the hospital and asked him to stop aspirin and to start Allegra.  After I saw him in  follow-up I recommended he restart aspirin and continue Allegra and see if angioedema returns.  Within a few days of restarting aspirin he had significant tongue swelling which responded to Benadryl.  He has been off aspirin since mid August.  He said a couple more episodes of minor angioedema affecting half of his tongue.  He stopped Allegra because he thought maybe there is some bloating.  It seems that Allegra was helping to prevent episodes.  He has remote history of myocardial infarction I believe in the 1990s.  He did have a stent placed at that time.    Review of Systems: A comprehensive review of systems was negative except as noted.     Medications, Allergies and Problem List   Reviewed, reconciled and updated  Post Discharge Medication Reconciliation Status:      Physical Exam   General Appearance:   No acute distress    /74 (Patient Site: Right Arm, Patient Position: Sitting, Cuff Size: Adult Regular)   Pulse 77   Ht 6' (1.829 m)   Wt (!) 273 lb (123.8 kg)   SpO2 99%   BMI 37.03 kg/m      HEENT exam is unremarkable  Neck supple no thyromegaly or nodule palpable  Lymphatic no cervical lymphadenopathy  Cardiovascular regular rate and rhythm no murmur gallop or rub  Pulmonary lungs are clear to auscultation bilaterally  Gastrointestinal abdomen soft nontender nondistended no organomegaly  Neurologic exam is non focal  Psychiatric pleasant, no confusion or agitation        Additional Information   Current Outpatient Medications   Medication Sig Dispense Refill     acetaminophen (TYLENOL) 325 MG tablet Take 2 tablets (650 mg total) by mouth every 4 (four) hours as needed.  0     diphenhydrAMINE (BENADRYL) 25 mg tablet Take 25-50 mg by mouth every 6 (six) hours as needed.              famotidine (PEPCID) 20 MG tablet TAKE 1 TABLET TWICE A  tablet 3     fexofenadine (ALLEGRA) 180 MG tablet Take 1 tablet (180 mg total) by mouth daily. 90 tablet 3     hydroCHLOROthiazide (HYDRODIURIL) 25 MG  tablet TAKE 1 TABLET DAILY (DUE FOR CLINIC VISIT PRIOR TO FURTHER REFILLS) 90 tablet 2     metoprolol succinate (TOPROL-XL) 100 MG 24 hr tablet Take 1 tablet (100 mg total) by mouth daily.       nitroglycerin (NITROSTAT) 0.4 MG SL tablet Place 1 tablet (0.4 mg total) under the tongue as needed. Use as directed. 30 tablet 0     simvastatin (ZOCOR) 40 MG tablet TAKE 1 TABLET AT BEDTIME 90 tablet 2     No current facility-administered medications for this visit.      Allergies   Allergen Reactions     Ace Inhibitors Angioedema     Arb-Angiotensin Receptor Antagonist Angioedema     Aspirin      Angioedema - stopped aspirin, angioedema resolved, restarted recurred (fairly strong evidence)     Gabapentin Angioedema     Losartan Angioedema     Ramipril Angioedema     Seafood Nausea And Vomiting     Shellfish Containing Products Nausea And Vomiting     SHRIMP- worst reaction to.     Social History     Tobacco Use     Smoking status: Former Smoker     Smokeless tobacco: Never Used   Substance Use Topics     Alcohol use: Yes     Alcohol/week: 1.0 standard drinks     Types: 1 Glasses of wine per week     Comment: rare - only in [place]     Drug use: No       Review and/or order of clinical lab tests:  Review and/or order of radiology tests:  Review and/or order of medicine tests:  Discussion of test results with performing physician:  Decision to obtain old records and/or obtain history from someone other than the patient:  Review and summarization of old records and/or obtaining history from someone other than the patient and.or discussion of case with another health care provider:  Independent visualization of image, tracing or specimen itself:    Time:      Luis Elena MD

## 2021-06-04 VITALS
HEIGHT: 72 IN | HEART RATE: 87 BPM | DIASTOLIC BLOOD PRESSURE: 70 MMHG | BODY MASS INDEX: 37.52 KG/M2 | SYSTOLIC BLOOD PRESSURE: 130 MMHG | WEIGHT: 277 LBS | OXYGEN SATURATION: 97 %

## 2021-06-04 VITALS
SYSTOLIC BLOOD PRESSURE: 126 MMHG | DIASTOLIC BLOOD PRESSURE: 70 MMHG | WEIGHT: 275 LBS | OXYGEN SATURATION: 93 % | HEIGHT: 72 IN | HEART RATE: 70 BPM | BODY MASS INDEX: 37.25 KG/M2

## 2021-06-04 NOTE — TELEPHONE ENCOUNTER
Refill Approved    Rx renewed per Medication Renewal Policy. Medication was last renewed on 3/18/19.    Latanya Rahman, [facility] Triage/Med Refill 12/6/2019     Requested Prescriptions   Pending Prescriptions Disp Refills     hydroCHLOROthiazide (HYDRODIURIL) 25 MG tablet [Pharmacy Med Name: HYDROCHLOROTHIAZIDE TAB 25MG] 90 tablet 4     Sig: TAKE 1 TABLET DAILY (DUE FOR CLINIC VISIT PRIOR TO FURTHER REFILLS)       Diuretics/Combination Diuretics Refill Protocol  Passed - 12/4/2019  1:23 PM        Passed - Visit with PCP or prescribing provider visit in past 12 months     Last office visit with prescriber/PCP: 11/6/2019 Luis Elena MD OR same dept: 11/6/2019 Luis Elena MD OR same specialty: 11/6/2019 Luis Elena MD  Last physical: Visit date not found Last MTM visit: Visit date not found   Next visit within 3 mo: Visit date not found  Next physical within 3 mo: Visit date not found  Prescriber OR PCP: Luis Elena MD  Last diagnosis associated with med order: 1. Essential hypertension  - hydroCHLOROthiazide (HYDRODIURIL) 25 MG tablet [Pharmacy Med Name: HYDROCHLOROTHIAZIDE TAB 25MG]; TAKE 1 TABLET DAILY (DUE FOR CLINIC VISIT PRIOR TO FURTHER REFILLS)  Dispense: 90 tablet; Refill: 4    2. Mixed hyperlipidemia  - simvastatin (ZOCOR) 40 MG tablet [Pharmacy Med Name: SIMVASTATIN TABS 40MG]; TAKE 1 TABLET AT BEDTIME  Dispense: 90 tablet; Refill: 4    If protocol passes may refill for 12 months if within 3 months of last provider visit (or a total of 15 months).             Passed - Serum Potassium in past 12 months      Lab Results   Component Value Date    Potassium 3.7 11/06/2019             Passed - Serum Sodium in past 12 months      Lab Results   Component Value Date    Sodium 138 11/06/2019             Passed - Blood pressure on file in past 12 months     BP Readings from Last 1 Encounters:   11/08/19 120/78             Passed - Serum Creatinine in past 12 months       Creatinine   Date Value Ref Range Status   11/06/2019 0.91 0.70 - 1.30 mg/dL Final             simvastatin (ZOCOR) 40 MG tablet [Pharmacy Med Name: SIMVASTATIN TABS 40MG] 90 tablet 4     Sig: TAKE 1 TABLET AT BEDTIME       Statins Refill Protocol (Hmg CoA Reductase Inhibitors) Passed - 12/4/2019  1:23 PM        Passed - PCP or prescribing provider visit in past 12 months      Last office visit with prescriber/PCP: 11/6/2019 Luis Elena MD OR same dept: 11/6/2019 Luis Elena MD OR same specialty: 11/6/2019 Luis Elena MD  Last physical: Visit date not found Last MTM visit: Visit date not found   Next visit within 3 mo: Visit date not found  Next physical within 3 mo: Visit date not found  Prescriber OR PCP: Luis Elena MD  Last diagnosis associated with med order: 1. Essential hypertension  - hydroCHLOROthiazide (HYDRODIURIL) 25 MG tablet [Pharmacy Med Name: HYDROCHLOROTHIAZIDE TAB 25MG]; TAKE 1 TABLET DAILY (DUE FOR CLINIC VISIT PRIOR TO FURTHER REFILLS)  Dispense: 90 tablet; Refill: 4    2. Mixed hyperlipidemia  - simvastatin (ZOCOR) 40 MG tablet [Pharmacy Med Name: SIMVASTATIN TABS 40MG]; TAKE 1 TABLET AT BEDTIME  Dispense: 90 tablet; Refill: 4    If protocol passes may refill for 12 months if within 3 months of last provider visit (or a total of 15 months).

## 2021-06-05 VITALS
SYSTOLIC BLOOD PRESSURE: 116 MMHG | BODY MASS INDEX: 39.44 KG/M2 | HEIGHT: 70 IN | OXYGEN SATURATION: 96 % | HEART RATE: 72 BPM | DIASTOLIC BLOOD PRESSURE: 76 MMHG | WEIGHT: 275.5 LBS

## 2021-06-05 VITALS
SYSTOLIC BLOOD PRESSURE: 138 MMHG | DIASTOLIC BLOOD PRESSURE: 62 MMHG | OXYGEN SATURATION: 97 % | HEIGHT: 70 IN | WEIGHT: 278 LBS | HEART RATE: 72 BPM | BODY MASS INDEX: 39.8 KG/M2

## 2021-06-05 VITALS
SYSTOLIC BLOOD PRESSURE: 114 MMHG | HEIGHT: 72 IN | RESPIRATION RATE: 16 BRPM | HEART RATE: 88 BPM | WEIGHT: 278.5 LBS | BODY MASS INDEX: 37.72 KG/M2 | DIASTOLIC BLOOD PRESSURE: 62 MMHG

## 2021-06-05 VITALS
BODY MASS INDEX: 36.33 KG/M2 | DIASTOLIC BLOOD PRESSURE: 80 MMHG | SYSTOLIC BLOOD PRESSURE: 122 MMHG | WEIGHT: 268.19 LBS | HEART RATE: 82 BPM | TEMPERATURE: 97.2 F | HEIGHT: 72 IN | OXYGEN SATURATION: 98 %

## 2021-06-05 NOTE — PROGRESS NOTES
Office Visit - Follow Up   Chaim Dee   79 y.o. male    Date of Visit: 2/6/2020    Chief Complaint   Patient presents with     Hypertension        Assessment and Plan   1. Essential hypertension  Controlled continue current medications  - metoprolol succinate (TOPROL-XL) 100 MG 24 hr tablet; Take 1 tablet (100 mg total) by mouth daily.  Dispense: 90 tablet; Refill: 3    2. NYHA class 1 heart failure with reduced ejection fraction (H)  Appears euvolemic, continue current medications    3. Mixed hyperlipidemia  Continue statin    4. Angioedema, subsequent encounter  Now off ACE inhibitor and ARB.  Fairly strong correlation with aspirin use.  Now on clopidogrel.    5. Morbid obesity (H)  The following high BMI interventions were performed this visit: encouragement to exercise and lifestyle education regarding diet    Return in about 3 months (around 5/6/2020) for annual physical.     History of Present Illness   This 79 y.o. old man comes in for follow-up.  He has had no episodes of angioedema since stopping aspirin and started Allegra.  Weight is generally been stable.  Gained a few pounds over the holidays having a hard time losing them.  No leg swelling.  Interestingly allergies and cough and lung congestion improved significantly with Allegra.  No chest pain or shortness of breath.  Not exercising much.    Review of Systems: A comprehensive review of systems was negative except as noted.     Medications, Allergies and Problem List   Reviewed, reconciled and updated  Post Discharge Medication Reconciliation Status:      Physical Exam   General Appearance:   No acute distress    /70 (Patient Site: Left Arm, Patient Position: Sitting, Cuff Size: Adult Regular)   Pulse 70   Ht 6' (1.829 m)   Wt (!) 275 lb (124.7 kg)   SpO2 93%   BMI 37.30 kg/m      HEENT exam is unremarkable  Neck supple no thyromegaly or nodule palpable  Lymphatic no cervical lymphadenopathy  Cardiovascular regular rate and  rhythm no murmur gallop or rub  Pulmonary lungs are clear to auscultation bilaterally  Gastrointestinal abdomen soft nontender nondistended no organomegaly  Neurologic exam is non focal  Psychiatric pleasant, no confusion or agitation        Additional Information   Current Outpatient Medications   Medication Sig Dispense Refill     acetaminophen (TYLENOL) 325 MG tablet Take 2 tablets (650 mg total) by mouth every 4 (four) hours as needed.  0     clopidogrel (PLAVIX) 75 mg tablet Take 1 tablet (75 mg total) by mouth daily. 30 tablet 12     diphenhydrAMINE (BENADRYL) 25 mg tablet Take 25-50 mg by mouth every 6 (six) hours as needed.              famotidine (PEPCID) 20 MG tablet TAKE 1 TABLET TWICE A  tablet 3     fexofenadine (ALLEGRA) 180 MG tablet Take 1 tablet (180 mg total) by mouth daily. 90 tablet 3     hydroCHLOROthiazide (HYDRODIURIL) 25 MG tablet TAKE 1 TABLET DAILY (DUE FOR CLINIC VISIT PRIOR TO FURTHER REFILLS) 90 tablet 3     nitroglycerin (NITROSTAT) 0.4 MG SL tablet Place 1 tablet (0.4 mg total) under the tongue as needed. Use as directed. 30 tablet 0     simvastatin (ZOCOR) 40 MG tablet TAKE 1 TABLET AT BEDTIME 90 tablet 3     metoprolol succinate (TOPROL-XL) 100 MG 24 hr tablet Take 1 tablet (100 mg total) by mouth daily. 90 tablet 3     No current facility-administered medications for this visit.      Allergies   Allergen Reactions     Ace Inhibitors Angioedema     Arb-Angiotensin Receptor Antagonist Angioedema     Aspirin      Angioedema - stopped aspirin, angioedema resolved, restarted recurred (fairly strong evidence)     Gabapentin Angioedema     Losartan Angioedema     Ramipril Angioedema     Seafood Nausea And Vomiting     Shellfish Containing Products Nausea And Vomiting     SHRIMP- worst reaction to.     Social History     Tobacco Use     Smoking status: Former Smoker     Smokeless tobacco: Never Used   Substance Use Topics     Alcohol use: Yes     Alcohol/week: 1.0 standard drinks      Types: 1 Glasses of wine per week     Comment: rare - only in [place]     Drug use: No       Review and/or order of clinical lab tests:  Review and/or order of radiology tests:  Review and/or order of medicine tests:  Discussion of test results with performing physician:  Decision to obtain old records and/or obtain history from someone other than the patient:  Review and summarization of old records and/or obtaining history from someone other than the patient and.or discussion of case with another health care provider:  Independent visualization of image, tracing or specimen itself:    Time:      Luis Elena MD

## 2021-06-08 NOTE — PROGRESS NOTES
Office Visit - Follow Up   Chaim Dee   76 y.o. male    Date of Visit: 2/17/2017    Chief Complaint   Patient presents with     Knee Pain        Assessment and Plan   1. NYHA class 1 ischemic heart failure with reduced ejection fraction, EF 35-40%, s/p ICD  Recheck labs, continue current dose of metoprolol, increase dose of ramipril, statin and aspirin  - Basic Metabolic Panel; Future  - Basic Metabolic Panel    2. Chronic coronary artery disease  Secondary prevention as above    3. Essential hypertension  Blood pressure control increase in ACE inhibitor    4. Mixed hyperlipidemia  Continue statin    5. Primary osteoarthritis of both knees  Acetaminophen  - Ambulatory referral to Orthopedic Surgery    6. Chronic midline low back pain without sciatica  Consistent with spinal stenosis, mild symptoms at this point no red flags, I offered her spine care clinic he declined.  Can't get an MRI with his pacemaker.  Okay for walking program on his treadmill    The following high BMI interventions were performed this visit: encouragement to exercise and lifestyle education regarding diet    Return in about 3 months (around 5/17/2017) for recheck.     History of Present Illness   Brother Rich comes in for follow-up of hypertension, congestive heart failure, knee pain and an acute complaint of back pain.  He is generally done well with the increase and ACE inhibitor.  No lightheadedness dizziness chest pain or shortness of breath.  We reviewed his x-rays of his knees, arthritis, recommended he see orthopedic surgery.  He's also been having progressive back pain.  He notes that the pain is worse when he walks but if he uses a shopping cart he has no symptoms.  For this reason he likes to go grocery shopping.  He wonders if a walking program at the treadmill at his home would be helpful.  He doesn't have any saddle anesthesia or bowel or bladder symptoms.    Review of Systems: A comprehensive review of systems was  negative except as noted.     Medications, Allergies and Problem List   Reviewed and updated     Physical Exam   General Appearance:   No acute distress    Visit Vitals     /64 (Patient Site: Right Arm, Patient Position: Sitting, Cuff Size: Adult Regular)     Pulse 90     Ht 6' (1.829 m)     Wt (!) 314 lb (142.4 kg)     SpO2 96%     BMI 42.59 kg/m2       He is obese, perivascular regular rate and rhythm no murmur gallop or rub lungs clear auscultation bilaterally abdomen obese, soft, nontender nondistended no organomegaly.  He ambulates without difficulty.  Normal strength in the lower extremities.  Normal sensation.  Arthritis of both knees.       Additional Information   Current Outpatient Prescriptions   Medication Sig Dispense Refill     aspirin 81 MG EC tablet Take 81 mg by mouth daily.       famotidine (PEPCID) 20 MG tablet Take 1 tablet (20 mg total) by mouth 2 (two) times a day. 180 tablet 3     hydroCHLOROthiazide (HYDRODIURIL) 25 MG tablet Take 1 tablet (25 mg total) by mouth daily. 90 tablet 3     metoprolol succinate (TOPROL-XL) 50 MG 24 hr tablet Take 3 tablets (150 mg total) by mouth daily. 270 tablet 3     nitroglycerin (NITROSTAT) 0.4 MG SL tablet Place 0.4 mg under the tongue.       ramipril (ALTACE) 10 MG capsule Take 2 capsules (20 mg total) by mouth daily. 180 capsule 3     simvastatin (ZOCOR) 40 MG tablet Take 1 tablet (40 mg total) by mouth bedtime. 90 tablet 3     vitamin E 400 UNIT capsule Take 1 capsule by mouth daily.       No current facility-administered medications for this visit.      Allergies   Allergen Reactions     Shellfish Containing Products      Social History   Substance Use Topics     Smoking status: Former Smoker     Smokeless tobacco: None     Alcohol use None      Comment: rare - only in [place]       Review and/or order of clinical lab tests:  Review and/or order of radiology tests:  Review and/or order of medicine tests:  Discussion of test results with performing  physician:  Decision to obtain old records and/or obtain history from someone other than the patient:  Review and summarization of old records and/or obtaining history from someone other than the patient and.or discussion of case with another health care provider:  Independent visualization of image, tracing or specimen itself:    Time:      Luis Elena MD

## 2021-06-08 NOTE — PROGRESS NOTES
"Chaim Dee is a 80 y.o. male who is being evaluated via a billable telephone visit.      The patient has been notified of following:     \"This telephone visit will be conducted via a call between you and your physician/provider. We have found that certain health care needs can be provided without the need for a physical exam.  This service lets us provide the care you need with a short phone conversation.  If a prescription is necessary we can send it directly to your pharmacy.  If lab work is needed we can place an order for that and you can then stop by our lab to have the test done at a later time.    Telephone visits are billed at different rates depending on your insurance coverage. During this emergency period, for some insurers they may be billed the same as an in-person visit.  Please reach out to your insurance provider with any questions.    If during the course of the call the physician/provider feels a telephone visit is not appropriate, you will not be charged for this service.\"    Patient has given verbal consent to a Telephone visit? Yes    What phone number would you like to be contacted at? [phone]    Patient would like to receive their AVS by AVS Preference: Mail a copy.    Additional provider notes: Brother Rich is seen via telephone visit.  Mental status okay.  Speech normal.  Breathing unlabored.  Has been doing quite well.  Approaching 6 months of no angioedema or urticaria.  This coincides with stopping aspirin and starting Allegra.  No chest pain.  No breathing difficulty.  Weight stable.  Self isolating.    Assessment/Plan:  1. NYHA class 1 ischemic heart failure with reduced ejection fraction, EF 35-40%, s/p ICD  Stable continue current meds    2. Chronic coronary artery disease  Stable, on statin, now on clopidogrel due to aspirin allergy    3. Angioedema, subsequent encounter  Has had no issues for 6 months stopping aspirin and starting Allegra    4. Essential " hypertension  Has been controlled continue same    5. Morbid obesity (H)  Continues to track his weight daily, not gaining weight and has had a downward trajectory    Phone call duration:  15 minutes    Luis Elena MD

## 2021-06-08 NOTE — PROGRESS NOTES
Office Visit - Follow Up   Chaim Dee   76 y.o. male    Date of Visit: 1/31/2017    Chief Complaint   Patient presents with     Knee Pain     Right knee pain        Assessment and Plan   1. Chronic coronary artery disease  Agree with stopping clopidogrel which the patient will do now, continue aspirin and statin.  He does not smoke.    2. NYHA class 1 ischemic heart failure with reduced ejection fraction, EF 35-40%, s/p ICD  He is on metoprolol, increase ramipril to 20 mg, stopped Dyazide start hydrochlorothiazide, recheck labs within one week of making the switch.  He has a mail order pharmacy and it may take a couple weeks    3. Essential hypertension  Blood pressure control, follow up in one week after making medication changes    4. Mixed hyperlipidemia  Continue statin    5. Chronic pain of right knee  Likely benefit from orthopedic referral consideration of injection, may need knee replacement  - XR Knee Right 1 or 2 VWS; Future      The following high BMI interventions were performed this visit: encouragement to exercise and lifestyle education regarding diet    Return in about 2 weeks (around 2/14/2017) for recheck.     History of Present Illness   This 76 y.o. old [place] brother comes in for follow-up of coronary heart disease, obesity, hypertension and evaluation of chronic right.  Knee pain.  He recently saw his cardiologist and it was recommended that he stop clopidogrel.  It was also recommended that he increase ramipril.  He has not yet made these changes.  He is still on Dyazide.  Is not having any chest pain or symptoms of congestive heart failure.  He does have chronic right knee pain, response to acetaminophen but seems to be getting worse.    Review of Systems: A comprehensive review of systems was negative except as noted.     Medications, Allergies and Problem List   Reviewed and updated     Physical Exam   General Appearance:   No acute distress, obese    Visit Vitals     /64  (Patient Site: Right Arm, Patient Position: Sitting, Cuff Size: Adult Regular)     Pulse 92     Ht 6' (1.829 m)     Wt (!) 317 lb (143.8 kg)     SpO2 96%     BMI 42.99 kg/m2       HEENT exam is unremarkable, neck supple, no cervical lymphadenopathy, cardiovascular regular rate and rhythm no murmur gallop or rub, lungs are clear to auscultation bilaterally, abdomen soft nontender nondistended no organomegaly, neurologic exam is nonfocal, psychiatric pleasant, no confusion or agitation knee with crepitus       Additional Information   Current Outpatient Prescriptions   Medication Sig Dispense Refill     aspirin 81 MG EC tablet Take 81 mg by mouth daily.       famotidine (PEPCID) 20 MG tablet Take 1 tablet (20 mg total) by mouth 2 (two) times a day. 180 tablet 3     metoprolol succinate (TOPROL-XL) 50 MG 24 hr tablet Take 3 tablets (150 mg total) by mouth daily. 270 tablet 3     nitroglycerin (NITROSTAT) 0.4 MG SL tablet Place 0.4 mg under the tongue.       ramipril (ALTACE) 10 MG capsule Take 2 capsules (20 mg total) by mouth daily. 180 capsule 3     simvastatin (ZOCOR) 40 MG tablet Take 1 tablet (40 mg total) by mouth bedtime. 90 tablet 3     vitamin E 400 UNIT capsule Take 1 capsule by mouth daily.       hydroCHLOROthiazide (HYDRODIURIL) 25 MG tablet Take 1 tablet (25 mg total) by mouth daily. 90 tablet 3     No current facility-administered medications for this visit.      Allergies   Allergen Reactions     Shellfish Containing Products      Social History   Substance Use Topics     Smoking status: Former Smoker     Smokeless tobacco: None     Alcohol use None      Comment: rare - only in [place]       Review and/or order of clinical lab tests:  Review and/or order of radiology tests:  Review and/or order of medicine tests:  Discussion of test results with performing physician:  Decision to obtain old records and/or obtain history from someone other than the patient:  Review and summarization of old records and/or  obtaining history from someone other than the patient and.or discussion of case with another health care provider:  Independent visualization of image, tracing or specimen itself:    Time:      Luis Elena MD

## 2021-06-09 NOTE — TELEPHONE ENCOUNTER
Who is calling:  Chaim  Reason for Call:  Patient states he is on his way to the [facility] for an 8:00 am appointment.  He missed a call from the clinic a few minutes ago.  Please call him back if needed otherwise he will be arriving there shortly.  Date of last appointment with primary care: 5/8/20  Okay to leave a detailed message: Yes

## 2021-06-09 NOTE — PROGRESS NOTES
Assessment and Plan:   1. Routine general medical examination at a health care facility  This is an 80-year-old man who comes in for annual wellness visit.  Issues as discussed below    2. NYHA class 1 ischemic heart failure with reduced ejection fraction, EF 35-40%, s/p ICD  Well compensated, last echocardiogram shows an ejection fraction of 50% with some distal septal and apical hypokinesis.  Continue beta-blocker.  - Thyroid Stimulating Hormone (TSH)  - Lipid Cascade  - HM2(CBC w/o Differential)  - Comprehensive Metabolic Panel    3. ICD (implantable cardioverter-defibrillator), single, in situ    4. Essential hypertension  Blood pressures controlled continue same    5. Mixed hyperlipidemia  Continue statin    6. Chronic coronary artery disease  Continue secondary prevention, on Plavix due to aspirin allergy    7. Angioedema, subsequent encounter  Mildly related to aspirin, no issues since.  Also has been on consistent antihistamine therapy as well    8. FLO - did not tolerate CPAP    9. Advanced care planning/counseling discussion  We had a 20-minute discussion today.  He changed his CODE STATUS to DNR.  He does have an ICD.  His medical decision maker is his niece Maggi.  His second decision-maker be his nephew Jose.  He specifically states that he would be okay with hospitalization for COVID-19 and care up to the point of intubation.  He would not want to be intubated.  He would want to be kept comfortable.    10. DNR (do not resuscitate)  - No CPR- Do NOT Intubate    11. Morbid obesity (H)  See below    The patient's current medical problems were reviewed.    I have had an Advance Directives discussion with the patient.  The following high BMI interventions were performed this visit: encouragement to exercise and lifestyle education regarding diet  The following health maintenance schedule was reviewed with the patient and provided in printed form in the after visit summary:   Health Maintenance   Topic  Date Due     TSH  1940     HEART FAILURE ACTION PLAN  1940     PNEUMOCOCCAL IMMUNIZATION 65+ LOW/MEDIUM RISK (1 of 2 - PCV13) 04/28/2003     ZOSTER VACCINES (2 of 2) 12/01/2016     ALT  01/17/2020     BMP  05/06/2020     CBC  11/06/2020     LIPID  11/06/2020     FALL RISK ASSESSMENT  05/08/2021     MEDICARE ANNUAL WELLNESS VISIT  06/23/2021     ADVANCE CARE PLANNING  10/06/2021     TD 18+ HE  10/06/2026     INFLUENZA VACCINE RULE BASED  Completed        Subjective:   Chief Complaint: Chaim Dee is an 80 y.o. male here for an Annual Wellness visit.   HPI: Brother Rich comes in for annual wellness visit, follow-up of chronic medical problems.  Overall doing well.  Some anxiety about COVID-19.  Wondering if he can go grocery shopping during low peak x4 people who are at risk.  Had a little bit of tongue irritation which he took Benadryl on May 31 resolved.  Has gained about 2 pounds which he believes is caloric.  Slight amount of edema in his ankles which improves by the morning and with walking.  No chest pain no shortness of breath.  No GI symptoms nausea vomiting diarrhea.  No blood in the stool no black tarry stool.    Review of Systems:   Please see above.  The rest of the review of systems are negative for all systems.    Patient Care Team:  Luis Elena MD as PCP - General (Internal Medicine)  Luis Elena MD as Assigned PCP     Patient Active Problem List   Diagnosis     Chronic coronary artery disease     Hypertension     Hyperlipidemia     Morbid obesity (H)     NYHA class 1 ischemic heart failure with reduced ejection fraction, EF 35-40%, s/p ICD     Gastroesophageal reflux disease     FLO - did not tolerate CPAP     Angioedema     ICD (implantable cardioverter-defibrillator), single, in situ     Ischemic cardiomyopathy     DNR (do not resuscitate)     Past Medical History:   Diagnosis Date     Arthritis unknown    right knee     Cholecystitis      Coronary artery  disease     had stents placed 08/08/1999     Hypercholesteremia      Hypertension      ICD (implantable cardioverter-defibrillator) in place      Myocardial infarction (H) 1999     Obesity      Scrotal swelling      Sleep apnea     can't tolerate cpap      Past Surgical History:   Procedure Laterality Date     CARDIAC DEFIBRILLATOR PLACEMENT  2015     CORONARY STENT PLACEMENT  08/08/1999    LAD and Circ     FRACTURE SURGERY       LAPAROSCOPIC CHOLECYSTECTOMY N/A 11/9/2018    Procedure: LAPAROSCOPIC CHOLECYSTECTOMY;  Surgeon: Mark Bee MD;  Location: [facility] OR;  Service: General     ORIF TIBIA & FIBULA FRACTURES Right 1945     TONSILLECTOMY  1944      Family History   Problem Relation Age of Onset     Other Mother         gallbladder surgery     Stroke Father      Coronary artery disease Brother       Social History     Socioeconomic History     Marital status: Single     Spouse name: Not on file     Number of children: Not on file     Years of education: Not on file     Highest education level: Not on file   Occupational History     Not on file   Social Needs     Financial resource strain: Not on file     Food insecurity     Worry: Not on file     Inability: Not on file     Transportation needs     Medical: Not on file     Non-medical: Not on file   Tobacco Use     Smoking status: Former Smoker     Smokeless tobacco: Never Used   Substance and Sexual Activity     Alcohol use: Yes     Alcohol/week: 1.0 standard drinks     Types: 1 Glasses of wine per week     Comment: rare - only in [place]     Drug use: No     Sexual activity: Never   Lifestyle     Physical activity     Days per week: Not on file     Minutes per session: Not on file     Stress: Not on file   Relationships     Social connections     Talks on phone: Not on file     Gets together: Not on file     Attends Sabianist service: Not on file     Active member of club or organization: Not on file     Attends meetings of clubs or organizations:  Not on file     Relationship status: Not on file     Intimate partner violence     Fear of current or ex partner: Not on file     Emotionally abused: Not on file     Physically abused: Not on file     Forced sexual activity: Not on file   Other Topics Concern     Not on file   Social History Narrative    Rod Villanueva, [employer].  Graduate [employer]  Was  for [employer].  From [place] [place].  He is into genealogy.        Current Outpatient Medications   Medication Sig Dispense Refill     acetaminophen (TYLENOL) 325 MG tablet Take 2 tablets (650 mg total) by mouth every 4 (four) hours as needed.  0     clopidogrel (PLAVIX) 75 mg tablet Take 1 tablet (75 mg total) by mouth daily. 30 tablet 12     diphenhydrAMINE (BENADRYL) 25 mg tablet Take 25-50 mg by mouth every 6 (six) hours as needed.              famotidine (PEPCID) 20 MG tablet TAKE 1 TABLET TWICE A  tablet 3     fexofenadine (ALLEGRA) 180 MG tablet Take 1 tablet (180 mg total) by mouth daily. 90 tablet 3     hydroCHLOROthiazide (HYDRODIURIL) 25 MG tablet TAKE 1 TABLET DAILY (DUE FOR CLINIC VISIT PRIOR TO FURTHER REFILLS) 90 tablet 3     metoprolol succinate (TOPROL-XL) 100 MG 24 hr tablet Take 1 tablet (100 mg total) by mouth daily. 90 tablet 3     nitroglycerin (NITROSTAT) 0.4 MG SL tablet Place 1 tablet (0.4 mg total) under the tongue as needed. Use as directed. 30 tablet 0     simvastatin (ZOCOR) 40 MG tablet TAKE 1 TABLET AT BEDTIME 90 tablet 3     No current facility-administered medications for this visit.       Objective:   Vital Signs:   Visit Vitals  /70 (Patient Site: Left Arm, Patient Position: Sitting, Cuff Size: Adult Regular)   Pulse 87   Ht 6' (1.829 m)   Wt (!) 277 lb (125.6 kg)   SpO2 97%   BMI 37.57 kg/m           VisionScreening:  No exam data present     PHYSICAL EXAM  EYES: Eyelids, conjunctiva, and sclera were normal. Pupils were normal. Cornea, iris, and lens were normal bilaterally.  HEAD, EARS, NOSE,  MOUTH, AND THROAT: Head and face were normal. Hearing was normal to voice and the ears were normal to external exam. Nose appearance was normal and there was no discharge. Oropharynx was normal.  NECK: Neck appearance was normal. There were no neck masses and the thyroid was not enlarged.  RESPIRATORY: Breathing pattern was normal and the chest moved symmetrically.  Percussion/auscultatory percussion was normal.  Lung sounds were normal and there were no abnormal sounds.  CARDIOVASCULAR: Heart rate and rhythm were normal.  S1 and S2 were normal and there were no extra sounds or murmurs. Peripheral pulses in arms and legs were normal.  Jugular venous pressure was normal.  There was no peripheral edema.  ICD, small amount of ankle edema  GASTROINTESTINAL: The abdomen was normal in contour.  Bowel sounds were present.  Percussion detected no organ enlargement or tenderness.  Palpation detected no tenderness, mass, or enlarged organs.   MUSCULOSKELETAL: Skeletal configuration was normal and muscle mass was normal for age. Joint appearance was overall normal.  LYMPHATIC: There were no enlarged nodes.  SKIN/HAIR/NAILS: Skin color was normal.  There were no skin lesions.  Hair and nails were normal.  NEUROLOGIC: The patient was alert and oriented to person, place, time, and circumstance. Speech was normal. Cranial nerves were normal. Motor strength was normal for age. The patient was normally coordinated.  PSYCHIATRIC:  Mood and affect were normal and the patient had normal recent and remote memory. The patient's judgment and insight were normal.      Assessment Results 6/23/2020   Activities of Daily Living No help needed   Instrumental Activities of Daily Living No help needed   Get Up and Go Score Less than 12 seconds   Mini Cog Total Score 4   Some recent data might be hidden     A Mini-Cog score of 0-2 suggests the possibility of dementia, score of 3-5 suggests no dementia    Identified Health Risks:     The patient  was provided with suggestions to help him develop a healthy physical lifestyle.   The patient was provided with written information regarding signs of hearing loss.  Patient's advanced directive was discussed and I am comfortable with the patient's wishes.

## 2021-06-13 NOTE — TELEPHONE ENCOUNTER
I placed a COVID order in this encounter. Please sign if appropriate. Any other information to relay to patient? Thank you.    Maddy Johns, CMA

## 2021-06-13 NOTE — TELEPHONE ENCOUNTER
Refill Approved    Rx renewed per Medication Renewal Policy. Medication was last renewed on 12/6/19.    Latanya Rahman, [facility] Triage/Med Refill 12/11/2020     Requested Prescriptions   Pending Prescriptions Disp Refills     hydroCHLOROthiazide (HYDRODIURIL) 25 MG tablet [Pharmacy Med Name: HYDROCHLOROTHIAZIDE TABS 25MG] 90 tablet 3     Sig: TAKE 1 TABLET DAILY (DUE FOR CLINIC VISIT PRIOR TO FURTHER REFILLS)       Diuretics/Combination Diuretics Refill Protocol  Passed - 12/10/2020 10:13 AM        Passed - Visit with PCP or prescribing provider visit in past 12 months     Last office visit with prescriber/PCP: 6/23/2020 Luis Elena MD OR same dept: Visit date not found OR same specialty: 6/23/2020 Luis Elena MD  Last physical: Visit date not found Last MTM visit: Visit date not found   Next visit within 3 mo: Visit date not found  Next physical within 3 mo: Visit date not found  Prescriber OR PCP: Luis Elena MD  Last diagnosis associated with med order: 1. Essential hypertension  - hydroCHLOROthiazide (HYDRODIURIL) 25 MG tablet [Pharmacy Med Name: HYDROCHLOROTHIAZIDE TABS 25MG]; TAKE 1 TABLET DAILY (DUE FOR CLINIC VISIT PRIOR TO FURTHER REFILLS)  Dispense: 90 tablet; Refill: 3    2. Mixed hyperlipidemia  - simvastatin (ZOCOR) 40 MG tablet [Pharmacy Med Name: SIMVASTATIN TABS 40MG]; TAKE 1 TABLET AT BEDTIME  Dispense: 90 tablet; Refill: 3    If protocol passes may refill for 12 months if within 3 months of last provider visit (or a total of 15 months).             Passed - Serum Potassium in past 12 months      Lab Results   Component Value Date    Potassium 3.8 06/23/2020             Passed - Serum Sodium in past 12 months      Lab Results   Component Value Date    Sodium 139 06/23/2020             Passed - Blood pressure on file in past 12 months     BP Readings from Last 1 Encounters:   11/13/20 114/62             Passed - Serum Creatinine in past 12 months      Creatinine    Date Value Ref Range Status   06/23/2020 0.86 0.70 - 1.30 mg/dL Final                simvastatin (ZOCOR) 40 MG tablet [Pharmacy Med Name: SIMVASTATIN TABS 40MG] 90 tablet 3     Sig: TAKE 1 TABLET AT BEDTIME       Statins Refill Protocol (Hmg CoA Reductase Inhibitors) Passed - 12/10/2020 10:13 AM        Passed - PCP or prescribing provider visit in past 12 months      Last office visit with prescriber/PCP: 6/23/2020 Luis Elena MD OR same dept: Visit date not found OR same specialty: 6/23/2020 Luis Elena MD  Last physical: Visit date not found Last MTM visit: Visit date not found   Next visit within 3 mo: Visit date not found  Next physical within 3 mo: Visit date not found  Prescriber OR PCP: Luis Elena MD  Last diagnosis associated with med order: 1. Essential hypertension  - hydroCHLOROthiazide (HYDRODIURIL) 25 MG tablet [Pharmacy Med Name: HYDROCHLOROTHIAZIDE TABS 25MG]; TAKE 1 TABLET DAILY (DUE FOR CLINIC VISIT PRIOR TO FURTHER REFILLS)  Dispense: 90 tablet; Refill: 3    2. Mixed hyperlipidemia  - simvastatin (ZOCOR) 40 MG tablet [Pharmacy Med Name: SIMVASTATIN TABS 40MG]; TAKE 1 TABLET AT BEDTIME  Dispense: 90 tablet; Refill: 3    If protocol passes may refill for 12 months if within 3 months of last provider visit (or a total of 15 months).

## 2021-06-13 NOTE — TELEPHONE ENCOUNTER
Amelie Collins - in follow up, no sign of any infection nor of a prostate problem - therefore, as we discussed, it is most likely that your elevated blood sugar is what has caused the urinary frequency.  Please let me know if this does not adequately respond to treatment - Take care, Dr Pavan Christy   Who is calling:  Chaim   Reason for Call:  Patient recently exposed to covid and was advised to get tested. He like to know if he can speak to Dr. Elena today, I do not show any openings until tomorrow. Please advise   Date of last appointment with primary care: 6/23/20  Okay to leave a detailed message: Yes

## 2021-06-13 NOTE — TELEPHONE ENCOUNTER
Chaim came into the clinic today for COVID testing, but he is having symptoms of cough and cold and has been exposed to his housemate who is positive for COVID. I told him we are not doing screening for patients with symptoms or known exposure here and that he will be redirected to our clinic in [place] for swabbing in his car. I said he would be contacted about what to do next. I also advised him to stay away from other people. He expressed understanding and went home.

## 2021-06-13 NOTE — TELEPHONE ENCOUNTER
I called and spoke with Chaim and he is set up for a telephone visit today today with Dr. Elena.

## 2021-06-13 NOTE — TELEPHONE ENCOUNTER
Who is calling:  Patient  Reason for Call:  Patient scheduled with another provider for today but would rather talk to Dr. Elena if possible  Date of last appointment with primary care: n/a  Okay to leave a detailed message: Yes

## 2021-06-13 NOTE — TELEPHONE ENCOUNTER
Refill Approved    Rx renewed per Medication Renewal Policy. Medication was last renewed on 02/06/2020.  Last office visit was 11/24/2020 with PCP.    Zeynep Hoffmann, [facility] Triage/Med Refill 12/20/2020     Requested Prescriptions   Pending Prescriptions Disp Refills     metoprolol succinate (TOPROL-XL) 100 MG 24 hr tablet [Pharmacy Med Name: METOPROLOL SUCCINATE ER TABS 100MG] 90 tablet 3     Sig: TAKE 1 TABLET DAILY       Beta-Blockers Refill Protocol Passed - 12/17/2020  6:34 AM        Passed - PCP or prescribing provider visit in past 12 months or next 3 months     Last office visit with prescriber/PCP: 6/23/2020 Luis Elena MD OR same dept: 6/23/2020 Luis Elena MD OR same specialty: 6/23/2020 Luis Elena MD  Last physical: Visit date not found Last MTM visit: Visit date not found   Next visit within 3 mo: Visit date not found  Next physical within 3 mo: Visit date not found  Prescriber OR PCP: Luis Elena MD  Last diagnosis associated with med order: 1. Essential hypertension  - metoprolol succinate (TOPROL-XL) 100 MG 24 hr tablet [Pharmacy Med Name: METOPROLOL SUCCINATE ER TABS 100MG]; TAKE 1 TABLET DAILY  Dispense: 90 tablet; Refill: 3    If protocol passes may refill for 12 months if within 3 months of last provider visit (or a total of 15 months).             Passed - Blood pressure filed in past 12 months     BP Readings from Last 1 Encounters:   11/13/20 114/62

## 2021-06-13 NOTE — TELEPHONE ENCOUNTER
Patient Returning Call  Reason for call: Patient returning call to check on the status of this request.  Information relayed to patient:  Pendingl. Patient was encouraged to schedule a Covid test through the urgent care virtual appointment, he agrees with plan and was transferred to scheduling.   Patient was exposed to Covid and is having URI Sx.    Patient has additional questions:  Yes  If YES, what are your questions/concerns:  Patient is still requesting a call back.  Okay to leave a detailed message?: Yes

## 2021-06-13 NOTE — TELEPHONE ENCOUNTER
Attempted to contact the patient several times, but the line was always busy.  If the patient calls, please relay message below from Dr. Elena and help the patient to schedule a virtual visit as requested.  Thank you.  Anamaria LIMON CMA/LEANN....................8:41 AM

## 2021-06-13 NOTE — PROGRESS NOTES
"Chaim Dee is a 80 y.o. male who is being evaluated via a billable telephone visit.      The patient has been notified of following:     \"This telephone visit will be conducted via a call between you and your physician/provider. We have found that certain health care needs can be provided without the need for a physical exam.  This service lets us provide the care you need with a short phone conversation.  If a prescription is necessary we can send it directly to your pharmacy.  If lab work is needed we can place an order for that and you can then stop by our lab to have the test done at a later time.    Telephone visits are billed at different rates depending on your insurance coverage. During this emergency period, for some insurers they may be billed the same as an in-person visit.  Please reach out to your insurance provider with any questions.    If during the course of the call the physician/provider feels a telephone visit is not appropriate, you will not be charged for this service.\"    Patient has given verbal consent to a Telephone visit? Yes    What phone number would you like to be contacted at? [phone]    Patient would like to receive their AVS by AVS Preference: Mail a copy.    Additional provider notes:  Phone visit with Fr. Rucker. Concerned with COVID exposure because his housemate was found to have COVID [place]. Has some colds and dry cough. Denies fever and shortness of breath. His housemate is now quarantined in his room. Has 2 other priests in the house and will be tested too for COVID.     Assessment/Plan:  1. Exposure to COVID-19 virus  Exposed to a COVID positive housemate. Has some colds and dry cough. Advised I will test him for COVID.   - Asymptomatic COVID-19 Virus (CORONAVIRUS) PCR; Future        Phone call duration:  10  minutes    Irene Lauren [facility]    "

## 2021-06-13 NOTE — PROGRESS NOTES
"Chaim Dee is a 80 y.o. male who is being evaluated via a billable telephone visit.      The patient has been notified of following:     \"This telephone visit will be conducted via a call between you and your physician/provider. We have found that certain health care needs can be provided without the need for a physical exam.  This service lets us provide the care you need with a short phone conversation.  If a prescription is necessary we can send it directly to your pharmacy.  If lab work is needed we can place an order for that and you can then stop by our lab to have the test done at a later time.    Telephone visits are billed at different rates depending on your insurance coverage. During this emergency period, for some insurers they may be billed the same as an in-person visit.  Please reach out to your insurance provider with any questions.    If during the course of the call the physician/provider feels a telephone visit is not appropriate, you will not be charged for this service.\"    Patient has given verbal consent to a Telephone visit? Yes    What phone number would you like to be contacted at? 1 [phone]    Patient would like to receive their AVS by AVS Preference: Mail a copy.    Additional provider notes: This 80-year-old man is seen telephonically for follow-up of COVID-19.  Test came back positive.  Has a cough but otherwise stable.  No breathing difficulty.  Had some GI symptoms including diarrhea but this is resolving.  Some fatigue and body aches but improving.    Assessment/Plan:  1. Infection due to 2019 novel coronavirus  Continue with supportive care at home.  He does not have an oximeter.  We discussed breathing difficulty and symptoms to look for.  He has support from his community.  He will follow-up as needed    Phone call duration:  12 minutes    Joy C Steinert, CMA    "

## 2021-06-13 NOTE — PROGRESS NOTES
Office Visit - Follow Up   Chaim Dee   77 y.o. male    Date of Visit: 9/28/2017    Chief Complaint   Patient presents with     Hospital Visit Follow Up        Assessment and Plan   1. Need for prophylactic vaccination and inoculation against influenza  - Influenza High Dose, Seasonal 65+ yrs    2. Essential hypertension  Blood pressure is okay but given his reduced ejection fraction of like him on an ARB and will start losartan 50 mg daily.  He will follow-up in clinic in the next 3-7 days for lab work.    3. NYHA class 1 ischemic heart failure with reduced ejection fraction, EF 35-40%, s/p ICD  See above    4. Angioedema, subsequent encounter  No more ACE inhibitor        The following high BMI interventions were performed this visit: lifestyle education regarding diet    Return in about 1 week (around 10/5/2017) for recheck.     History of Present Illness   This 77 y.o. old  comes in for posterior follow-up.  He developed tongue swelling.  He was treated in the emergency room and ramipril was stopped.  This dose had recently been increased.  Since stopping this medication needle longer has any tongue swelling.  He has noticed a small amount of weight gain.  Small amount of swelling in his legs.  No breathing difficulty no orthopnea or PND.  No chest pain.    Review of Systems: A comprehensive review of systems was negative except as noted.     Medications, Allergies and Problem List   Reviewed and updated     Physical Exam   General Appearance:   No acute distress    /72 (Patient Site: Left Arm, Patient Position: Sitting, Cuff Size: Adult Regular)  Pulse 71  Ht 6' (1.829 m)  Wt (!) 318 lb (144.2 kg)  SpO2 96%  BMI 43.13 kg/m2    Patient is obese.  Heart regular rate and rhythm no murmur gallop or rub lungs are clear to auscultation bilaterally he has a small amount of edema in his lower extremities.  Neurologic exam is nonfocal.  He has no swelling of his tongue.  No stridor.   No lymphadenopathy.     Additional Information   Current Outpatient Prescriptions   Medication Sig Dispense Refill     aspirin 81 MG EC tablet Take 81 mg by mouth daily.       famotidine (PEPCID) 20 MG tablet Take 1 tablet (20 mg total) by mouth 2 (two) times a day. 180 tablet 3     hydroCHLOROthiazide (HYDRODIURIL) 25 MG tablet Take 1 tablet (25 mg total) by mouth daily. 90 tablet 3     metoprolol succinate (TOPROL-XL) 50 MG 24 hr tablet Take 3 tablets (150 mg total) by mouth daily. 270 tablet 3     nitroglycerin (NITROSTAT) 0.4 MG SL tablet Place 0.4 mg under the tongue.       simvastatin (ZOCOR) 40 MG tablet Take 1 tablet (40 mg total) by mouth bedtime. 90 tablet 3     vitamin E 400 UNIT capsule Take 1 capsule by mouth daily.       losartan (COZAAR) 50 MG tablet Take 1 tablet (50 mg total) by mouth daily. 90 tablet 3     No current facility-administered medications for this visit.      Allergies   Allergen Reactions     Shellfish Containing Products      Social History   Substance Use Topics     Smoking status: Former Smoker     Smokeless tobacco: None     Alcohol use None      Comment: rare - only in [place]       Review and/or order of clinical lab tests:  Review and/or order of radiology tests:  Review and/or order of medicine tests:  Discussion of test results with performing physician:  Decision to obtain old records and/or obtain history from someone other than the patient:  Review and summarization of old records and/or obtaining history from someone other than the patient and.or discussion of case with another health care provider:  Independent visualization of image, tracing or specimen itself:    Time:      Luis Elena MD

## 2021-06-13 NOTE — PROGRESS NOTES
Office Visit - Follow Up   Chaim Dee   77 y.o. male    Date of Visit: 10/18/2017    Chief Complaint   Patient presents with     Hypertension     fasting        Assessment and Plan   1. Essential hypertension  Blood pressure controlled continue current medication    2. NYHA class 1 ischemic heart failure with reduced ejection fraction, EF 35-40%, s/p ICD  Appears euvolemic, could lose some weight, continue metoprolol, losartan, hydrochlorothiazide, aspirin and statin.  - Basic Metabolic Panel    3. Mixed hyperlipidemia  Continue statin  - Lipid Cascade    4. Chronic coronary artery disease  Continue secondary prevention    5. Adiposity  The following high BMI interventions were performed this visit: encouragement to exercise and lifestyle education regarding diet    Return in about 3 months (around 1/18/2018) for recheck.     History of Present Illness   This 77 y.o. old  comes in for follow-up of numerous medical problems.  I last saw him with angioedema and we switched him from an ACE inhibitor to losartan.  He has started this in general he is tolerating it.  He had a couple episodes of lightheadedness early on but this is resolved.  Incidentally he also had worsening GERD symptoms which is now quieted down as well.  Otherwise she has no chest pain or shortness of breath no recurrent tongue swelling.  No nausea or vomiting no diarrhea.  No increased leg swelling or change in weight.    Review of Systems: A comprehensive review of systems was negative except as noted.     Medications, Allergies and Problem List   Reviewed and updated     Physical Exam   General Appearance:   No acute distress    /84 (Patient Site: Right Arm, Patient Position: Sitting, Cuff Size: Adult Regular)  Pulse 90  Ht 6' (1.829 m)  Wt (!) 320 lb (145.2 kg)  SpO2 96%  BMI 43.4 kg/m2    HEENT exam is unremarkable  Neck supple no thyromegaly or nodule palpable  Lymphatic no cervical  lymphadenopathy  Cardiovascular regular rate and rhythm no murmur gallop or rub  Pulmonary lungs are clear to auscultation bilaterally  Gastrointestinall abdomen soft nontender nondistended no organomegaly  Neurologic exam is non focal  Psychiatric pleasant, no confusion or agitation        Additional Information   Current Outpatient Prescriptions   Medication Sig Dispense Refill     aspirin 81 MG EC tablet Take 81 mg by mouth daily.       famotidine (PEPCID) 20 MG tablet Take 1 tablet (20 mg total) by mouth 2 (two) times a day. 180 tablet 3     hydroCHLOROthiazide (HYDRODIURIL) 25 MG tablet Take 1 tablet (25 mg total) by mouth daily. 90 tablet 3     losartan (COZAAR) 50 MG tablet Take 1 tablet (50 mg total) by mouth daily. 90 tablet 3     metoprolol succinate (TOPROL-XL) 50 MG 24 hr tablet Take 3 tablets (150 mg total) by mouth daily. 270 tablet 3     nitroglycerin (NITROSTAT) 0.4 MG SL tablet Place 0.4 mg under the tongue.       simvastatin (ZOCOR) 40 MG tablet Take 1 tablet (40 mg total) by mouth bedtime. 90 tablet 3     vitamin E 400 UNIT capsule Take 1 capsule by mouth daily.       No current facility-administered medications for this visit.      Allergies   Allergen Reactions     Ace Inhibitors Angiodema     Shellfish Containing Products      Social History   Substance Use Topics     Smoking status: Former Smoker     Smokeless tobacco: None     Alcohol use None      Comment: rare - only in [place]       Review and/or order of clinical lab tests:  Review and/or order of radiology tests:  Review and/or order of medicine tests:  Discussion of test results with performing physician:  Decision to obtain old records and/or obtain history from someone other than the patient:  Review and summarization of old records and/or obtaining history from someone other than the patient and.or discussion of case with another health care provider:  Independent visualization of image, tracing or specimen itself:    Time:       Luis Elena MD

## 2021-06-14 NOTE — PROGRESS NOTES
Office Visit - Follow Up   Chaim Dee   80 y.o. male    Date of Visit: 1/18/2021    Chief Complaint   Patient presents with     Follow-up     11/22/20 positive covid        Assessment and Plan   1. Chronic systolic heart failure (H)  Appears well compensated continue same  - Basic Metabolic Panel    2. Angioedema, subsequent encounter  Etiology uncertain, I do suspect aspirin and NSAIDs played a role.  Diet could also be playing a role.  He questions whether he should have Covid vaccine.  Given his recent infection I recommended reassessing in 3 months.  I think he should have ongoing immunity now and I think there is a concern about allergic reaction to the vaccine given his angioedema which occurs frequently    3. Essential hypertension  Controlled continue same    4. Mixed hyperlipidemia  Continue statin    5. Chronic coronary artery disease  Continue secondary prevention    6. Morbid obesity (H)  As below    7. Polyneuropathy  Not mentioned below he is experiencing some numbness in his feet.  Check B12 as below.  Blood sugars have been okay  - Vitamin B12      The following high BMI interventions were performed this visit: encouragement to exercise and lifestyle education regarding diet    Return in about 3 months (around 4/18/2021) for annual physical.     History of Present Illness   This 82 y.o. old brother comes in for follow-up.  In late November he had COVID-19.  He is recovering nicely as have his fellow brothers.  His weight is down.  Generally feeling well.  No shortness of breath no chest pain or chest tightness.  No ongoing cough.  He had almost a year of no angioedema stop some antihistamines and developed angioedema very mild in early November and then had another episode while he had Covid.  Nothing since.  He is now off aspirin.  No relationship to food that he can tell.    Review of Systems: A comprehensive review of systems was negative except as noted.     Medications, Allergies and  Problem List   Reviewed, reconciled and updated  Post Discharge Medication Reconciliation Status:      Physical Exam   General Appearance:   No acute distress    /80 (Patient Site: Left Arm, Patient Position: Sitting, Cuff Size: Adult Regular)   Pulse 82   Temp 97.2  F (36.2  C) (Tympanic)   Ht 6' (1.829 m)   Wt (!) 268 lb 3 oz (121.6 kg)   SpO2 98%   BMI 36.37 kg/m      HEENT exam is unremarkable  Neck supple no thyromegaly or nodule palpable  Lymphatic no cervical lymphadenopathy  Cardiovascular regular rate and rhythm no murmur gallop or rub  Pulmonary lungs are clear to auscultation bilaterally  Gastrointestinal abdomen soft nontender nondistended no organomegaly  Neurologic exam is non focal  Psychiatric pleasant, no confusion or agitation        Additional Information   Current Outpatient Medications   Medication Sig Dispense Refill     acetaminophen (TYLENOL) 325 MG tablet Take 2 tablets (650 mg total) by mouth every 4 (four) hours as needed.  0     clopidogreL (PLAVIX) 75 mg tablet TAKE 1 TABLET DAILY 90 tablet 2     diphenhydrAMINE (BENADRYL) 25 mg tablet Take 25-50 mg by mouth every 6 (six) hours as needed.              famotidine (PEPCID) 20 MG tablet TAKE 1 TABLET TWICE A  tablet 3     fexofenadine (ALLEGRA) 180 MG tablet Take 1 tablet (180 mg total) by mouth daily. 90 tablet 3     hydroCHLOROthiazide (HYDRODIURIL) 25 MG tablet TAKE 1 TABLET DAILY (DUE FOR CLINIC VISIT PRIOR TO FURTHER REFILLS) 90 tablet 1     metoprolol succinate (TOPROL-XL) 100 MG 24 hr tablet TAKE 1 TABLET DAILY 90 tablet 3     nitroglycerin (NITROSTAT) 0.4 MG SL tablet Place 1 tablet (0.4 mg total) under the tongue as needed. Use as directed. 30 tablet 0     simvastatin (ZOCOR) 40 MG tablet TAKE 1 TABLET AT BEDTIME 90 tablet 3     No current facility-administered medications for this visit.      Allergies   Allergen Reactions     Ace Inhibitors Angioedema     Arb-Angiotensin Receptor Antagonist Angioedema      Aspirin      Angioedema - stopped aspirin, angioedema resolved, restarted recurred (fairly strong evidence)     Gabapentin Angioedema     Losartan Angioedema     Ramipril Angioedema     Seafood Nausea And Vomiting     Shellfish Containing Products Nausea And Vomiting     SHRIMP- worst reaction to.     Social History     Tobacco Use     Smoking status: Former Smoker     Smokeless tobacco: Never Used   Substance Use Topics     Alcohol use: Yes     Alcohol/week: 1.0 standard drinks     Types: 1 Glasses of wine per week     Comment: rare - only in [place]     Drug use: No       Review and/or order of clinical lab tests:  Review and/or order of radiology tests:  Review and/or order of medicine tests:  Discussion of test results with performing physician:  Decision to obtain old records and/or obtain history from someone other than the patient:  Review and summarization of old records and/or obtaining history from someone other than the patient and.or discussion of case with another health care provider:  Independent visualization of image, tracing or specimen itself:    Time:      Luis Elena MD

## 2021-06-15 NOTE — PROGRESS NOTES
Office Visit - Follow Up   Chaim Dee   77 y.o. male    Date of Visit: 2/6/2018    Chief Complaint   Patient presents with     Sciatica     right side        Assessment and Plan   1. Low back pain  Appears to have radicular low back pain, general contraindication to NSAIDs given his heart history.  Will try Tylenol arthritis up to 6 tablets a day.  Discussed importance of weight loss, stretching and strengthening exercises and will have him see our spine clinic    2. NYHA class 1 ischemic heart failure with reduced ejection fraction, EF 35-40%, s/p ICD  Well compensated continue current medications generally try to avoid NSAIDs    3. Essential hypertension  Controlled continue current medication    4. Chronic coronary artery disease  Tinea secondary prevention with aspirin statin, discussed Mediterranean diet    5. Gastroesophageal reflux disease without esophagitis  Continue famotidine    6. Chronic right-sided low back pain with right-sided sciatica  - Ambulatory referral to [facility]    7. Adiposity  The following high BMI interventions were performed this visit: encouragement to exercise and lifestyle education regarding diet    Return in about 3 months (around 5/6/2018) for recheck.     History of Present Illness   This 77 y.o. old [employer] brother comes in for evaluation of right-sided low back pain.  This is been going on for some time.  Pain starts in his right buttocks and radiates down the back and side of his leg and occasionally into his foot.  Is worse at night especially when he lies on his right side.  It can be quite severe.  It hurts when he walks.  No known injury.  No loss of bowel or bladder function.  No weakness, occasional numbness.  He uses occasional acetaminophen which is not too helpful.  He does have underlying hypertension and congestive heart failure which have been well compensated.  He also has coronary artery disease.    Review of Systems: A comprehensive review of  systems was negative except as noted.     Medications, Allergies and Problem List   Reviewed and updated     Physical Exam   General Appearance:   No acute distress    /62 (Patient Site: Left Arm, Patient Position: Sitting, Cuff Size: Adult Regular)  Pulse 97  Ht 6' (1.829 m)  Wt (!) 319 lb (144.7 kg)  SpO2 91%  BMI 43.26 kg/m2    HEENT exam is unremarkable  Neck supple no thyromegaly or nodule palpable  Lymphatic no cervical lymphadenopathy  Cardiovascular regular rate and rhythm no murmur gallop or rub  Pulmonary lungs are clear to auscultation bilaterally  Gastrointestinall abdomen soft nontender nondistended no organomegaly  Psychiatric pleasant, no confusion or agitation   Pain with palpation the low back buttocks, strength in the lower extremities, symmetric reflexes       Additional Information   Current Outpatient Prescriptions   Medication Sig Dispense Refill     aspirin 81 MG EC tablet Take 81 mg by mouth daily.       famotidine (PEPCID) 20 MG tablet TAKE 1 TABLET TWICE A  tablet 0     hydroCHLOROthiazide (HYDRODIURIL) 25 MG tablet TAKE 1 TABLET DAILY 90 tablet 0     losartan (COZAAR) 50 MG tablet Take 1 tablet (50 mg total) by mouth daily. 90 tablet 3     metoprolol succinate (TOPROL-XL) 50 MG 24 hr tablet TAKE 3 TABLETS (150 MG TOTAL) DAILY 270 tablet 0     nitroglycerin (NITROSTAT) 0.4 MG SL tablet Place 0.4 mg under the tongue.       simvastatin (ZOCOR) 40 MG tablet TAKE 1 TABLET AT BEDTIME 90 tablet 0     triamcinolone (KENALOG) 0.1 % cream Apply topically 2 (two) times a day. 80 g 11     vitamin E 400 UNIT capsule Take 1 capsule by mouth daily.       No current facility-administered medications for this visit.      Allergies   Allergen Reactions     Ace Inhibitors Angiodema     Shellfish Containing Products      Social History   Substance Use Topics     Smoking status: Former Smoker     Smokeless tobacco: Never Used     Alcohol use None      Comment: rare - only in [place]        Review and/or order of clinical lab tests:  Review and/or order of radiology tests:  Review and/or order of medicine tests:  Discussion of test results with performing physician:  Decision to obtain old records and/or obtain history from someone other than the patient:  Review and summarization of old records and/or obtaining history from someone other than the patient and.or discussion of case with another health care provider:  Independent visualization of image, tracing or specimen itself:    Time:      Luis Elena MD

## 2021-06-16 PROBLEM — I50.22 CHRONIC SYSTOLIC HEART FAILURE (H): Status: ACTIVE | Noted: 2020-11-13

## 2021-06-16 PROBLEM — I25.5 ISCHEMIC CARDIOMYOPATHY: Status: ACTIVE | Noted: 2020-02-10

## 2021-06-16 PROBLEM — Z66 DNR (DO NOT RESUSCITATE): Status: ACTIVE | Noted: 2020-06-23

## 2021-06-16 PROBLEM — T78.3XXA ANGIOEDEMA: Status: ACTIVE | Noted: 2018-03-01

## 2021-06-16 PROBLEM — Z95.810 ICD (IMPLANTABLE CARDIOVERTER-DEFIBRILLATOR), SINGLE, IN SITU: Status: ACTIVE | Noted: 2018-05-01

## 2021-06-16 NOTE — PROGRESS NOTES
Assessment:    Recurrent angioedema.  This possibly secondary to ACE inhibitor and recent ARB.  Angioedema can persist for months after stopping these medications.    Symptoms not consistent with an IgE mediated allergic reaction.  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    Plan:    Recommend antihistamines and prednisone at onset of swelling.  Recommend Benadryl 1-2 tablets every 6 hours.  Recommend prednisone 40 mg ×1    seek medical attention with difficulty breathing.    Negative shrimp and oysters testing.  Because of history of shellfish allergy I would recommend a shrimp challenge before starting those.  However, with oysters he can add this into his diet without a formal challenge.  ____________________________________________________________________________     Chaim is here because of concern of recurrent swelling.  He has had 3 episodes of swelling.  The first was in September 2017.  At that time he had tongue swelling.  He woke up in the morning 6 AM with it.  He recalls it was difficult to talk but was not difficult to breathe.  No wheezing.  No hives described.  No drooling.  He reports that by 11:00 AM that same morning symptoms had nearly resolved completelym at that time he was on an ACE inhibitor.  Ramipril was stopped.  He was placed on losartan instead.  He has second episode on February 22.  At that time again he had tongue swelling.  I was gone within 24 hours.  He did go to emergency room.  There, it was suggested that he stop gabapentin which she had started on February 12.  He had his final episode on March 1.  At that time woke up at 2:00 in the morning with tongue swelling.  He went to emergency room.  There he was observed overnight in the hospital.  The symptoms resolved completely.  He did report vomiting and diarrhea in the hospital.  Again no description of urticaria.  No wheezing or shortness of breath.  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  He recalls at age 14  getting sick with shrimp.  He has had some other accidental exposures were he has had vomiting and a choking sensation.  It has been at least 10 years since he had shrimp.  He recalls that his father was allergic to shrimp as well.  He does not carry an EpiPen.  Patient reports otherwise he has been well.  No unintended weight loss.  He does note some intended weight loss.  No reported fevers.    Review of symptoms as above otherwise negative    Past medical history: Coronary artery disease, hypertension, hyperlipidemia, obesity, gastroesophageal reflux disease, obstructive sleep apnea.  History of cholecystitis    Allergies: Patient has listed ace inhibitors and losartan.  Gabapentin also listed.  These all come from recent episodes of swelling    Family history: No known member of the family with allergy or asthma.    Social history: Currently patient lives in house with radiator heat.  No recent changes.  No pets in the home.  Patient was a cigarette smoker but stopped 40 years ago.    Medications: reviewed in chart Physical Exam:  General:  Alert and in no apparent distress.  Eyes:  Sclera clear.  Ears: TMs translucent grey with bony landmarks visible. Nose: Pale, boggy mucosal membranes.  Throat: Pink, moist.  No lesions.  Neck: Supple.  No lymphadenopathy.  Lungs: CTA.  CV: Regular rate and rhythm. Extremities: Well perfused.  No clubbing or cyanosis. Skin: No rash    Last Food Skin Allergy Test Results  Shellfish  Oyster  1:20 (W/F in mm): 0/0 (03/12/18 1042)  Shrimp  1:20 (W/F in mm): 0/0 (03/12/18 1042)  Lobster  1:20 W/F in mm): 0/0 (03/12/18 1042)  Controls  Device Type: QUINTIP (03/12/18 1042)  Neg. Control: 50% Glycerine-Saline H (W/F in millimeters): 0/0 (03/12/18 1042)  Pos. Control Histamine 6 mg/ml (W/F in millimeters): 7/10 (03/12/18 1042)     45 min spent in direct contact with the patient apart from testing.  More than 50% in counseling and coordination of care.

## 2021-06-16 NOTE — PROGRESS NOTES
[facility] Daily Progress     Patient Name: Chaim Villanueva  Date: 3/8/2018  Visit #: 3  PTA visit #:  na  Referral Diagnosis: Lumbar radiculitis  Referring provider: Callie Echavarria  Visit Diagnosis:     ICD-10-CM    1. Acute bilateral low back pain with right-sided sciatica M54.41    2. Generalized muscle weakness M62.81    3. Decreased ROM of lumbar spine M25.60    4. Decreased strength, endurance, and mobility R53.1     Z74.09    5. Unsteadiness on feet R26.81          Assessment:     HEP/POC compliance is  poor.  Response to Intervention Poor HEP compliance due to hospitalization. No low back or L buttock pain now with prednisone medication. Reviewed HEP and educated to start walking program as pt would like to continue to lose weight.  Patient is benefitting from skilled physical therapy and is making steady progress toward functional goals.  Patient is appropriate to continue with skilled physical therapy intervention, as indicated by initial plan of care.       PMH: obesity, coronary artery disease status post stenting, heart failure status post ICD placement, hypertension, hyperlipidemia, GERD, FLO, R Knee OA    Goal Status: on-going  Pt. will be independent with home exercise program in : 4 weeks (to self-manage and improve function)  Pt. will be able to walk : 20 minutes;for exercise/recreation;in 12 weeks;for community mobility;with less pain (with less than 3/10 pain)  Patient will stand : 30 minutes;in 12 weeks;for home chores (with less than 3/10 pain)  Pt will: complete > 9 STS in 30 sec to reduce falls risk in 8 weeks.    Plan / Patient Education:     Continue with initial plan of care.  Progress with home program as tolerated. progress with standing strengthening as able (limited by R knee pain)    Subjective:     Pain Ratin L posterior buttock and across low back   Pt was in the hospital from 3/1 to 3/3 from angioedema with a follow up yesterday with his PCP. Pt is  going to get an allergy test on Monday to see if that is causing any of his symptoms. He reports losing 20# prior to and during the hospital. He is avoiding salt and pop.   His low back has been feeling good but he is on prednisone. His knee can bother him a little bit. He is going in to get a shot in his knee soon.      Objective:     Lumbar AROM:  Flexion: to mid-calf   Ext: mod  Rotation: R mild, L mild  Lateral flexion: R mild L mild    Exercises:  Exercise #1: LTR 10x  Comment #1: isometric unilateral hip flexion in hooklying 10x5 sec hold B  Exercise #2: bridge - causes pain, switched to standing hip ext 5x3 sec hold, x2 sets leaning onto counter  Comment #2: seated lumbar flexion 3x20 sec hold  Exercise #3: not added to HEP: rows with L3 band 20x, shoulder ext with L3 band 20x, multifidus walk outs 5x5 sec hold each direction       Treatment Today     TREATMENT MINUTES COMMENTS   Evaluation     Self-care/ Home management     Manual therapy     Neuromuscular Re-education     Therapeutic Activity     Therapeutic Exercises 25 -see exercise flow sheet for date completed  -warm up: AP seated, marching, LAQ, 10-20 reps of all  -marching in place with 1 UE support, heel raises 20x, hip abd 2x10 reps B  -educated on walking program- start with 5-10 min (as tolerated) work up to 2, 10 min walks a day, then slowly increase time up to 30 min   Gait training     Modality__________________                Total 25    Blank areas are intentional and mean the treatment did not include these items.       Ekta Kelly, PT, DPT  3/8/2018

## 2021-06-16 NOTE — PROGRESS NOTES
Office Visit - Follow Up   Chaim Dee   77 y.o. male    Date of Visit: 3/7/2018    Chief Complaint   Patient presents with     Hospital Visit Follow Up     3/1-3/3/18-angioedema        Assessment and Plan   1. Angioedema, initial encounter  Likely related to losartan.  Similar episode while on lisinopril.  Had been on gabapentin and had an episode and was told to stop gabapentin but angioedema recurred.  Complement levels normal.  Tryptase normal.  C1 esterase inhibitor normal.  He is off antihistamine since Monday.  He is still on prednisone through next Monday.  He sees Dr. Von Esquivel on Monday.    2. NYHA class 1 ischemic heart failure with reduced ejection fraction, EF 35-40%, s/p ICD  Appears euvolemic, blood pressure control despite being off losartan, continue to monitor on current medications    3. Essential hypertension  See above    Diarrhea resolved, may have been secondary to angioedema of the gastrointestinal tract.  Now a bit constipated and recommended Metamucil.      The following high BMI interventions were performed this visit: encouragement to exercise and lifestyle education regarding diet    Return in about 3 months (around 6/7/2018) for recheck.     History of Present Illness   Brother Rich comes in for post hospital follow-up.  He has been doing well since he was discharged.  His tongue is no longer swollen and he knows this because he can curl it.  He has had no breathing difficulty.  He had an episode of diarrhea along with nausea and vomiting while in the hospital on the day prior to discharge and this is now resolved.  He actually feels a bit constipated.  He feels great on prednisone, no aches or pains and is increased his physical activity.  He has commitment to weight loss and has been eating healthier food with less calories.  His weight is down 4 pounds since discharge.  Otherwise feeling okay.    Review of Systems: A comprehensive review of systems was negative except  as noted.     Medications, Allergies and Problem List   Reviewed and updated     Physical Exam   General Appearance:   No acute distress    /76 (Patient Site: Right Arm, Patient Position: Sitting)  Pulse 72  Ht 6' (1.829 m)  Wt (!) 306 lb 8 oz (139 kg)  SpO2 95%  BMI 41.57 kg/m2    HEENT exam is unremarkable  Neck supple no thyromegaly or nodule palpable  Lymphatic no cervical lymphadenopathy  Cardiovascular regular rate and rhythm no murmur gallop or rub  Pulmonary lungs are clear to auscultation bilaterally  Gastrointestinall abdomen soft nontender nondistended no organomegaly  Neurologic exam is non focal  Psychiatric pleasant, no confusion or agitation        Additional Information   Current Outpatient Prescriptions   Medication Sig Dispense Refill     aspirin 81 MG EC tablet Take 81 mg by mouth at bedtime.        famotidine (PEPCID) 20 MG tablet Take 20 mg by mouth 2 (two) times a day.       hydroCHLOROthiazide (HYDRODIURIL) 25 MG tablet Take 25 mg by mouth daily.       metoprolol succinate (TOPROL-XL) 50 MG 24 hr tablet Take 150 mg by mouth daily.        nitroglycerin (NITROSTAT) 0.4 MG SL tablet Place 0.4 mg under the tongue as needed. Use as directed.       predniSONE (DELTASONE) 10 mg tablet Take 40mg by mouth daily for 3 days, then 30mg x 3 days, then 20mg x 3 days ,then 10mg x 3 days then stop 30 tablet 0     simvastatin (ZOCOR) 40 MG tablet Take 40 mg by mouth at bedtime.       triamcinolone (KENALOG) 0.1 % cream Apply topically 2 (two) times a day as needed.       loratadine (CLARITIN) 10 mg tablet Take 1 tablet (10 mg total) by mouth daily.  0     No current facility-administered medications for this visit.      Allergies   Allergen Reactions     Ace Inhibitors Angiodema     Arb-Angiotensin Receptor Antagonist Angiodema     Gabapentin Angiodema     Losartan Angiodema     Ramipril Angiodema     Seafood      Shellfish Containing Products Nausea And Vomiting     SHRIMP- worst reaction to.      Shrimp      Social History   Substance Use Topics     Smoking status: Former Smoker     Smokeless tobacco: Never Used     Alcohol use 0.6 oz/week     1 Glasses of wine per week      Comment: rare - only in [place]       Review and/or order of clinical lab tests:  Review and/or order of radiology tests:  Review and/or order of medicine tests:  Discussion of test results with performing physician:  Decision to obtain old records and/or obtain history from someone other than the patient:  Review and summarization of old records and/or obtaining history from someone other than the patient and.or discussion of case with another health care provider:  Independent visualization of image, tracing or specimen itself:    Time:      Luis Elena MD

## 2021-06-16 NOTE — PROGRESS NOTES
Office Visit - Follow Up   Chaim Dee   80 y.o. male    Date of Visit: 4/15/2021    Chief Complaint   Patient presents with     Follow-up     [facility]      Allergic Reaction        Assessment and Plan   1. Angioedema, subsequent encounter  I think his angioedema was likely provoked by the Covid vaccination although it is unclear if it was the vaccination itself or may be systemic response to the vaccination such as fever.  I discussed the risk benefit of additional vaccination.  I think he is probably well protected against coronavirus given his prior infection and now 1 vaccination.  He is quite interested in getting the second vaccination and therefore I have recommended that if he do this he continue on his antihistamines through the vaccination.  If he develops any tongue swelling he will start prednisone 40 mg daily and take 50 mg of Benadryl every 6 hours.  Any concern about worsening symptoms or any concern about breathing difficulty will prompt him to present to the emergency room.  He will have his follow-up brothers monitoring him in the 2 weeks after the vaccination.  - predniSONE (DELTASONE) 20 MG tablet; Take 40 mg by mouth daily as needed (tongue swelling).  Dispense: 6 tablet; Refill: 11    2. Essential hypertension  Controlled continue same    Return in about 3 months (around 7/15/2021) for recheck.     History of Present Illness   This 80 y.o. old man comes in for follow-up of recent ER visit.  He received his Covid vaccination and then 2 days later developed angioedema.  He has had recurrent angioedema and it is unclear what the etiology is.  He has had extensive evaluation and it is felt that maybe ACE inhibitor or ARB could be contributing which have been stopped or NSAIDs which also has been stopped.  He has had good results with daily antihistamine.  He did have Covid infection last year.  We discussed the risk benefit previously of Covid vaccination and the possible risk of allergic  "reaction such as angioedema.  He understood this risk and was well prepared when it presented.  He took Benadryl and this improved but then later in the evening had more swelling and went to the ER.  He was given IV steroids and antihistamines and had resolution of his symptoms.  He took prednisone for a couple of days and is now doing well.  He would actually like to get his second Covid vaccination.    Review of Systems: A comprehensive review of systems was negative except as noted.     Medications, Allergies and Problem List   Reviewed, reconciled and updated  Post Discharge Medication Reconciliation Status:      Physical Exam   General Appearance:   No acute distress    /62 (Patient Site: Left Arm, Patient Position: Sitting, Cuff Size: Adult Large)   Pulse 72   Ht 5' 10\" (1.778 m)   Wt (!) 278 lb (126.1 kg)   SpO2 97%   BMI 39.89 kg/m      HEENT exam is unremarkable  Neck supple no thyromegaly or nodule palpable  Lymphatic no cervical lymphadenopathy  Cardiovascular regular rate and rhythm no murmur gallop or rub  Pulmonary lungs are clear to auscultation bilaterally  Gastrointestinal abdomen soft nontender nondistended no organomegaly  Neurologic exam is non focal  Psychiatric pleasant, no confusion or agitation        Additional Information   Current Outpatient Medications   Medication Sig Dispense Refill     acetaminophen (TYLENOL) 325 MG tablet Take 2 tablets (650 mg total) by mouth every 4 (four) hours as needed.  0     clopidogreL (PLAVIX) 75 mg tablet TAKE 1 TABLET DAILY 90 tablet 2     diphenhydrAMINE (BENADRYL) 25 mg tablet Take 25-50 mg by mouth every 6 (six) hours as needed.              famotidine (PEPCID) 20 MG tablet TAKE 1 TABLET TWICE A  tablet 3     fexofenadine (ALLEGRA) 180 MG tablet Take 1 tablet (180 mg total) by mouth daily. 90 tablet 3     hydroCHLOROthiazide (HYDRODIURIL) 25 MG tablet TAKE 1 TABLET DAILY (DUE FOR CLINIC VISIT PRIOR TO FURTHER REFILLS) 90 tablet 1     " metoprolol succinate (TOPROL-XL) 100 MG 24 hr tablet TAKE 1 TABLET DAILY 90 tablet 3     nitroglycerin (NITROSTAT) 0.4 MG SL tablet Place 1 tablet (0.4 mg total) under the tongue as needed. Use as directed. 30 tablet 0     simvastatin (ZOCOR) 40 MG tablet TAKE 1 TABLET AT BEDTIME 90 tablet 3     predniSONE (DELTASONE) 20 MG tablet Take 40 mg by mouth daily as needed (tongue swelling). 6 tablet 11     No current facility-administered medications for this visit.      Allergies   Allergen Reactions     Ace Inhibitors Angioedema     Arb-Angiotensin Receptor Antagonist Angioedema     Aspirin      Angioedema - stopped aspirin, angioedema resolved, restarted recurred (fairly strong evidence)     Gabapentin Angioedema     Losartan Angioedema     Ramipril Angioedema     Seafood Nausea And Vomiting     Shellfish Containing Products Nausea And Vomiting     SHRIMP- worst reaction to.     Social History     Tobacco Use     Smoking status: Former Smoker     Smokeless tobacco: Never Used   Substance Use Topics     Alcohol use: Yes     Alcohol/week: 1.0 standard drinks     Types: 1 Glasses of wine per week     Comment: rare - only in [place]     Drug use: No       Review and/or order of clinical lab tests:  Review and/or order of radiology tests:  Review and/or order of medicine tests:  Discussion of test results with performing physician:  Decision to obtain old records and/or obtain history from someone other than the patient:  Review and summarization of old records and/or obtaining history from someone other than the patient and.or discussion of case with another health care provider:  Independent visualization of image, tracing or specimen itself:    Time:      Luis Elena MD

## 2021-06-16 NOTE — PROGRESS NOTES
ASSESSMENT: Chaim Dee is a 77 y.o. male with past medical history significant for obesity, coronary artery disease status post stenting, heart failure status post ICD placement, hypertension, hyperlipidemia, GERD, FLO a 2-3 month history of who presents today for new patient evaluation of pain radiating down the right lower extremity with numbness and tingling.  Patient has not had any advanced imaging of the lumbar spine.  I suspect that he has spondylosis with right lower lumbar neural compromise.  Pain most closely follows an L4 distribution.  He may have spinal stenosis.  He describes a positive shopping cart sign.  The patient was neurologically intact on exam.  -The patient also has known right knee osteoarthritis.    SHELTON: 46  Who 5: 18    PLAN:  A shared decision making model was used.  The patient's values and choices were respected.  The following represents what was discussed and decided upon by the physician assistant and the patient.      1.  DIAGNOSTIC TESTS: I placed an order for a CT lumbar spine.  The patient is unable to have an MRI since he has a ICD in place.    2.  PHYSICAL THERAPY: The patient will likely benefit from physical therapy.  I held off on ordering this until we have seen the results of his CT.    3.  MEDICATIONS:    -Gabapentin 100 mg as prescribed.  He was given a dosage titration chart.  He could increase his dose up to a maximum of 300 mg 3 times daily.  -Patient can continue using Tylenol as needed.  -I will avoid NSAIDs given his significant heart history.    4.  INTERVENTIONS: No interventions were ordered.  The patient may benefit from interventional pain management if he feels improved with conservative treatment, depending on the results of advanced imaging studies.    5.  PATIENT EDUCATION: The patient was in agreement with the above plan.  All questions were answered.  -The patient went definitely benefit from working toward a more ideal body weight both for his  overall health and for his pain.  We did not discuss this at today's visit.  I plan on discussing weight loss at his follow-up visit.    6.  FOLLOW-UP:   A nurse will call the patient with results of his CT lumbar spine.  At that time I will likely refer the patient physical therapy and recommend follow-up in 4 weeks.  If he has any questions or concerns in the meantime, he should not hesitate contact our clinic.      SUBJECTIVE:  Chaim Dee  Is a 77 y.o. male who presents today in consultation at request of Dr. Elena for new patient evaluation of pain radiating down the right leg with numbness and tingling.  The patient states that he began to have pain 2-1/2 months ago.  He denies any injury or event to cause the pain.  The patient states that he has been told that he has arthritis in his spine in the past, but it is never really been an issue for him.  He does have a history of a pinched nerve in the neck  which resolved with conservative treatment.  The patient states that his leg pain and paresthesias are persistent.  He was seen at his primary care clinic and he was referred to our clinic for further evaluation and treatment.    The patient complains of pain which begins in the right buttock.  The pain radiates down the posterior and lateral aspect of the thigh to the knee.  He denies any significant pain in the back itself.  With standing, he also feels some pain in the anterior right thigh.   when the pain is most severe he feels that extending into the anterior/medial shin.  The patient describes the pain as an aching, tight pain.  The patient states that his knee has also been more painful.  He has known osteoarthritis in the right knee.  He had a steroid injection in November which was helpful.  He says that he is not sure if the knee pain is due to his injection wearing off or if it is related to the new pain radiating down the leg.  Additionally, the patient's chronic numbness and tingling in  both feet when he lays down at night.  Patient denies weakness down the leg.  His pain is aggravated with laying on his right side.  The patient states he is having difficulty sleeping because of the pain.  States that the pain wakes him up every 2 hours.  His pain is also aggravated with prolonged standing.  He finds that if he stands for prolonged period of time and the pain begins, he is able to alleviate the pain with sitting down.  He finds that he is also able to stand longer and walk further if he is leaning forward, such as on a shopping cart.  Patient denies left-sided symptoms.  He denies any recent fevers, chills, or sweats.  He denies any loss of bowel or bladder control.    The patient has never had any treatments for his lower back.  He does not go to the chiropractor.  He has never had any spine surgery or spine injections.  As mentioned above, the patient had a steroid injection in the right knee in November 2017 at [facility].  The patient uses Tylenol as needed.  The patient states that initially Tylenol was effective, but recently he has not felt that same relief.    Current Outpatient Prescriptions on File Prior to Encounter   Medication Sig Dispense Refill     aspirin 81 MG EC tablet Take 81 mg by mouth daily.       famotidine (PEPCID) 20 MG tablet TAKE 1 TABLET TWICE A  tablet 0     hydroCHLOROthiazide (HYDRODIURIL) 25 MG tablet TAKE 1 TABLET DAILY 90 tablet 0     losartan (COZAAR) 50 MG tablet Take 1 tablet (50 mg total) by mouth daily. 90 tablet 3     metoprolol succinate (TOPROL-XL) 50 MG 24 hr tablet TAKE 3 TABLETS (150 MG TOTAL) DAILY 270 tablet 0     simvastatin (ZOCOR) 40 MG tablet TAKE 1 TABLET AT BEDTIME 90 tablet 0     triamcinolone (KENALOG) 0.1 % cream Apply topically 2 (two) times a day. 80 g 11     nitroglycerin (NITROSTAT) 0.4 MG SL tablet Place 0.4 mg under the tongue.       vitamin E 400 UNIT capsule Take 1 capsule by mouth daily.           Allergies   Allergen  Reactions     Ace Inhibitors Angiodema     Shellfish Containing Products Nausea And Vomiting     SHRIMP- worst reaction to.       Patient Active Problem List   Diagnosis     Chronic coronary artery disease     Hypertension     Hyperlipidemia     Adiposity     NYHA class 1 ischemic heart failure with reduced ejection fraction, EF 35-40%, s/p ICD     Gastroesophageal reflux disease     FLO - did not tolerate CPAP         Past Surgical History:   Procedure Laterality Date     CARDIAC DEFIBRILLATOR PLACEMENT  2015     CORONARY STENT PLACEMENT  08/08/1999    LAD and Circ     ORIF TIBIA & FIBULA FRACTURES Right 1945     TONSILLECTOMY  1944       Family History   Problem Relation Age of Onset     Other Mother      gallbladder surgery     Stroke Father      Coronary artery disease Brother    Mother and father had heart disease  Grandmother had a stroke    Social History     Social History     Marital status: Single     Spouse name: N/A     Number of children: N/A     Years of education: N/A     Social History Main Topics     Smoking status: Former Smoker     Smokeless tobacco: Never Used     Alcohol use None      Comment: rare - only in [place]     Drug use: No     Sexual activity: No     Other Topics Concern     None     Social History Narrative    Rod Villanueva, [employer].  Graduate [employer]  Was  for [employer].  From [facility].  He is into genealogy.           ROS: Positive for swelling of feet, poor sleep quality, back pain, joint pain, leg pain.  Specifically negative for bowel/bladder dysfunction, fevers,chills, appetite changes, unexplained weight loss.   Otherwise 13 systems reviewed are negative.  Please see the patient's intake questionnaire from today for details.      OBJECTIVE:  PHYSICAL EXAMINATION:    CONSTITUTIONAL:  Vital signs as above.  No acute distress.  The patient is well nourished and well groomed.  PSYCHIATRIC:  The patient is awake, alert, oriented to person, place, time  and answering questions appropriately with clear speech.    HEENT: Normocephalic, atraumatic.  Sclera clear.  Neck is supple.  SKIN:  Skin over the face, bilateral lower extremities, and posterior torso is clean, dry, intact without rashes.    GAIT: Patient ambulates with a flexed forward posture at the hips.  Gait is mildly antalgic, favoring the right.  The patient is able to rise onto toes bilaterally.  Able to rise onto heels bilaterally, patient reports increased pain on the right.  STANDING EXAMINATION: Mild tenderness to palpation over the right lower lumbar paraspinous muscles.  With palpation of the right lower lumbar paraspinous muscles, the patient reported increased paresthesias down the right leg.  Lumbar adduction mildly restricted.  Lumbar extension moderately restricted with increased pain.  MUSCLE STRENGTH:  The patient has 5/5 strength for the bilateral hip flexors, knee flexors/extensors, ankle dorsiflexors/plantar flexors, great toe extensors, ankle evertors/invertors.  NEUROLOGICAL:   1+ patellar, and achilles reflexes bilaterally.  Negative Babinski's bilaterally.  No ankle clonus bilaterally. Sensation to light touch is intact in the bilateral L4, L5, and S1 dermatomes.  VASCULAR:  2/4 dorsalis pedis pulses bilaterally.  Bilateral lower extremities are warm.  There is no pitting edema of the bilateral lower extremities.  ABDOMINAL:  Soft, non-distended, non-tender throughout all quadrants.  No pulsatile mass palpated in the left lower quadrant.  LYMPH NODES:  No palpable or tender inguinal lymph nodes.  MUSCULOSKELETAL: Straight leg raise on the right caused knee pain, but otherwise did not reproduce pain radiating down the leg.  Straight leg raise negative on the left.    RESULTS: I did review an x-ray of the right knee from the [place] [facility] dated January 31, 2017 which showed findings consistent with moderate to severe arthritis.  Please see  report for further details.

## 2021-06-16 NOTE — PROGRESS NOTES
[facility] Daily Progress     Patient Name: Chaim Villanueva  Date: 2018  Visit #: 2  PTA visit #:  na  Referral Diagnosis: Lumbar radiculitis  Referring provider: Callie Echavarria  Visit Diagnosis:     ICD-10-CM    1. Acute bilateral low back pain with right-sided sciatica M54.41    2. Generalized muscle weakness M62.81    3. Decreased ROM of lumbar spine M25.60    4. Decreased strength, endurance, and mobility R53.1     Z74.09    5. Unsteadiness on feet R26.81          Assessment:     HEP/POC compliance is  good .  Response to Intervention Switched bridge exercise to standing hip ext as pt reported pain with bridge at home. No radicular symptoms today with most pain localized to L posterior buttock.  Patient is benefitting from skilled physical therapy and is making steady progress toward functional goals.  Patient is appropriate to continue with skilled physical therapy intervention, as indicated by initial plan of care.       PMH: obesity, coronary artery disease status post stenting, heart failure status post ICD placement, hypertension, hyperlipidemia, GERD, FLO, R Knee OA    Goal Status: on-going  Pt. will be independent with home exercise program in : 4 weeks (to self-manage and improve function)  Pt. will be able to walk : 20 minutes;for exercise/recreation;in 12 weeks;for community mobility;with less pain (with less than 3/10 pain)  Patient will stand : 30 minutes;in 12 weeks;for home chores (with less than 3/10 pain)  Pt will: complete > 9 STS in 30 sec to reduce falls risk in 8 weeks.    Plan / Patient Education:     Continue with initial plan of care.  Progress with home program as tolerated. progress with standing strengthening as able (limited by R knee pain)    Subjective:     Pain Ratin L posterior buttock and across low back with standing  Does not have pain down his leg. Went to the ED due to allergic reaction with medication on 18. He stopped taking the  gabapentin and his pain improved. Sitting and reaching forward is when his pain increases the most. Stretching backwards helps with his pain.      Objective:     Lumbar AROM:  Flexion: to mid-calf   Ext: severe with soreness  Rotation: R mild, L mild    Exercises:  Exercise #1: LTR 10x  Comment #1: isometric unilateral hip flexion in hooklying 10x5 sec hold B  Exercise #2: bridge - causes pain, switched to standing hip ext 5x3 sec hold, x2 sets leaning onto counter  Comment #2: seated lumbar flexion 3x20 sec hold  Exercise #3: not added to HEP: rows with L3 band 20x, shoulder ext with L3 band 20x, multifidus walk outs 5x5 sec hold each direction       Treatment Today     TREATMENT MINUTES COMMENTS   Evaluation     Self-care/ Home management     Manual therapy     Neuromuscular Re-education     Therapeutic Activity     Therapeutic Exercises 25 -see exercise flow sheet for date completed   Gait training     Modality__________________                Total 25    Blank areas are intentional and mean the treatment did not include these items.       Ekta Kelly, PT, DPT  2/28/2018

## 2021-06-16 NOTE — PROGRESS NOTES
[facility] Certification Request    February 21, 2018      Patient: Chaim Dee  MR Number: [id number]  YOB: 1940  Date of Visit: 2/21/2018      Dear Callie Echavarria:    Thank you for this referral.   We are seeing Chaim Dee for Physical Therapy of his low back pain.    Medicare and/or Medicaid requires physician review and approval of the treatment plan. Please review the plan of care and verify that you agree with the therapy plan of care by co-signing this note.      Plan of Care  Authorization / Certification Start Date: 02/21/18  Authorization / Certification End Date: 05/22/18  Authorization / Certification Number of Visits: 12  Communication with: Referral Source  Patient Related Instruction: Nature of Condition;Treatment plan and rationale;Self Care instruction;Posture;Precautions;Basis of treatment;Expected outcome;Body mechanics;Next steps  Times per Week: 1-2  Number of Weeks: 12  Number of Visits: 12  Therapeutic Exercise: Stretching;ROM;Strengthening  Neuromuscular Reeducation: kinesio tape;posture;balance/proprioception;TNE;core  Manual Therapy: soft tissue mobilization;myofascial release;joint mobilization;strain counterstrain;muscle energy  Modalities: electrical stimulation;TENS;ultrasound;cold pack;hot pack (prn)  Gait Training: to reduce pain and improve function  Equipment: theraband    Goals:  Pt. will be independent with home exercise program in : 4 weeks (to self-manage and improve function)  Pt. will be able to walk : 20 minutes;for exercise/recreation;in 12 weeks;for community mobility;with less pain (with less than 3/10 pain)  Patient will stand : 30 minutes;in 12 weeks;for home chores (with less than 3/10 pain)  Pt will: complete > 9 STS in 30 sec to reduce falls risk in 8 weeks.      If you have any questions or concerns, please don't hesitate to call.    Sincerely,      Ekta Kelly, PT        Physician recommendation:     ___ Follow  therapist's recommendation        ___ Modify therapy      *Physician co-signature indicates they certify the need for these services furnished within this plan and while under their care.        [facility]   Lumbo-Pelvic Initial Evaluation    Patient Name: Chaim Dee- Prefers Chaim Villanueva  Date of evaluation: 2/21/2018  Referral Diagnosis: Lumbar radiculitis  Referring provider: Callie Echavarria  Visit Diagnosis:     ICD-10-CM    1. Acute bilateral low back pain with right-sided sciatica M54.41    2. Generalized muscle weakness M62.81    3. Decreased ROM of lumbar spine M25.60    4. Decreased strength, endurance, and mobility R53.1     Z74.09    5. Unsteadiness on feet R26.81        Assessment:   CT results: CONCLUSION:  1.  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 Careful correlation prior to any intervention advised. Mild to moderate multilevel degenerative disc and facet disease.   2.  Mild lateral recess narrowing on the left at L2-L3 and bilaterally at L4-L5. No central stenosis.  3.  Moderate severe neural foraminal narrowing on the bilaterally L5-S1.   4.  Moderate neural foraminal narrowing on the right at L4-L5.    Impairments in  pain, posture, ROM, joint mobility, strength, motor function, sensory function, ADL's, gait/locomotion and balance  Patient's signs and symptoms are consistent with lumbar stenosis due to foraminal narrowing. Pt presents with jessica lumbar spine pain with R radicular symptoms. His pain is worse with standing and walking and improves with sitting. He has jessica LE weakness, slight balance impairments, abdominal weakness, decreased lumbar AROM, and neg neurodynamic testing today. .  The POC is dynamic and will be modified on an ongoing basis.  Barriers to achieving goals as noted in the assessment section may affect outcome.  Prognosis to achieve goals is  good   Skilled PT is required to reduce pain and improve  function.  Pt. is appropriate for skilled PT intervention as outlined in the Plan of Care (POC).  Pt. is a good candidate for skilled PT services to improve pain levels and function.  Plan of care and goals were established in collaboration with patient.   His pain limits his ability to walk, stand, ascend/descend stairs, sleep and complete household chores.    Goals:  Pt. will be independent with home exercise program in : 4 weeks (to self-manage and improve function)  Pt. will be able to walk : 20 minutes;for exercise/recreation;in 12 weeks;for community mobility;with less pain (with less than 3/10 pain)  Patient will stand : 30 minutes;in 12 weeks;for home chores (with less than 3/10 pain)  Pt will: complete > 9 STS in 30 sec to reduce falls risk in 8 weeks.    Patient's expectations/goals are realistic.    Barriers to Learning or Achieving Goals:  Co-morbidities or other medical factors.  see subjective       Plan / Patient Instructions:        Plan of Care:   Authorization / Certification Start Date: 02/21/18  Authorization / Certification End Date: 05/22/18  Authorization / Certification Number of Visits: 12  Communication with: Referral Source  Patient Related Instruction: Nature of Condition;Treatment plan and rationale;Self Care instruction;Posture;Precautions;Basis of treatment;Expected outcome;Body mechanics;Next steps  Times per Week: 1-2  Number of Weeks: 12  Number of Visits: 12  Therapeutic Exercise: Stretching;ROM;Strengthening  Neuromuscular Reeducation: kinesio tape;posture;balance/proprioception;TNE;core  Manual Therapy: soft tissue mobilization;myofascial release;joint mobilization;strain counterstrain;muscle energy  Modalities: electrical stimulation;TENS;ultrasound;cold pack;hot pack (prn)  Gait Training: to reduce pain and improve function  Equipment: theraband    Plan for next visit: progress abdominal and hip strengthening as able, standing ex, balance, 2 MWT, walking program, try treadmill  "if appropriate     Subjective:         Social information:   Living Situation:Has stairs but can use an elevator, 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 male who presents to therapy today with complaints of R buttock that radiates down his right leg into his calf. Date of onset/duration of symptoms is about 4 months. Starting last Friday it began to radiate to the L buttock. Onset was gradual.  Pt reports standing is the thing that increases his pain the most. If he uses a cart his pain has improved. He reports pain with lying flat and \"straightening up\" while standing. He cannot lay on his R side and will usually sleep propped up (in more of a reclined position). Was not doing much for exercise prior to the pain starting. Pt denies a history of similar symptoms.    PMH: obesity, coronary artery disease status post stenting, heart failure status post ICD placement, hypertension, hyperlipidemia, GERD, FLO, R Knee OA    Pain Ratin  Pain rating at best: 4  Pain rating at worst: 9  Pain description: \"grabs on the way down\", numbness in both feet,     Functional limitations are described as occurring with:   Stand 15 min  Sit 1 hour  Walk 10 min  Bend  Stairs- uses railiing  Meal prep  Kneel/squat  Carry laundry/ groceries  Sleep- wakes 3x/night    Patient reports benefit from:  \"drugs\"         Objective:      Note: Items left blank indicates the item was not performed or not indicated at the time of the evaluation.    Patient Outcome Measures :    Modified Oswestry Low Back Pain Disablity Questionnaire  in %: 42   Scores range from 0-100%, where a score of 0% represents minimal pain and maximal function. The minimal clinically important difference is a score reduction of 12%.    Examination  1. Acute bilateral low back pain with right-sided sciatica     2. Generalized muscle weakness     3. Decreased " ROM of lumbar spine     4. Decreased strength, endurance, and mobility     5. Unsteadiness on feet       Involved side: Right  Leg and jessica low back     30 sec sit to stand:  7 reps with arms on thighs    Lumbar ROM:    Date: 2/21/2018     *Indicate scale AROM AROM AROM   Lumbar Flexion To distal knees     Lumbar Extension mod      Right Left Right Left Right Left   Lumbar Sidebending mild mild       Lumbar Rotation mod mod       Thoracic Flexion      Thoracic Extension      Thoracic Sidebending         Thoracic Rotation           Lower Extremity Strength:     Date: 2/21/2018     LE strength/5 Right Left Right Left Right Left   Hip Flexion (L1-3) 5 5       Hip Extension (L5-S1)         Hip Abduction (L4-5)         Hip Adduction (L2-3)         Hip External Rotation 4 4       Hip Internal Rotation         Knee Extension (L3-4) 4+ 5       Knee Flexion         Ankle Dorsiflexion (L4-5) 5 5       Great Toe Extension (L5) 5 5       Ankle Plantar flexion (S1) 2+ 2+       Abdominals        Sensation    DID not assess       Reflex Testing  Lumbar Dermatomes Right Left UE Reflexes Right Left   Iliac Crest and Groin (L1)   Biceps (C5-6)     Anterior Medial Thigh (L2)   Brachioradialis (C5-6)     Anterior Thigh, Medial Epicondyle Femur (L3)   Triceps (C7-8)     Lateral Thigh, Anterior Knee, Medial Leg/Malleolus (L4)   Henok s test     Lateral Leg, Dorsal Foot (L5)   LE Reflexes     Lateral Foot (S1)   Patellar (L3-4)     Posterior Leg (S2)   Achilles (S1-2)     Other:   Babinski Response       Palpation:    Lumbar Special Tests:     Lumbar Special Tests Right Left SI Tests Right  Left   Quadrant test   SI Compression     Straight leg raise   SI Distraction     Crossover response   POSH Test     Slump - - Sacral Thrust     Sit-up test  FADIR - -   Trunk extensor endurance test  Resisted Abduction     Prone instability test  Other: Scour - -   Pubic shotgun  Other: MARÍA - -       LE Screen:  Hip PROM limited jessica into ER/IR and  flexion due to body habitus    Treatment Today     TREATMENT MINUTES COMMENTS   Evaluation 20 -lumbar spine   Self-care/ Home management     Manual therapy     Neuromuscular Re-education     Therapeutic Activity     Therapeutic Exercises 30 -see exercise flow sheet  -educated on POC, diagnosis and HEP   Gait training     Modality__________________                Total 50    Blank areas are intentional and mean the treatment did not include these items.     PT Evaluation Code: (Please list factors)  Patient History/Comorbidities: see above  Examination: lumbar spine  Clinical Presentation: stable  Clinical Decision Making: low    Patient History/  Comorbidities Examination  (body structures and functions, activity limitations, and/or participation restrictions) Clinical Presentation Clinical Decision Making (Complexity)   No documented Comorbidities or personal factors 1-2 Elements Stable and/or uncomplicated Low   1-2 documented comorbidities or personal factor 3 Elements Evolving clinical presentation with changing characteristics Moderate   3-4 documented comorbidities or personal factors 4 or more Unstable and unpredictable High                Ekta Kelly, PT, DPT  2/21/2018  10:50 AM

## 2021-06-16 NOTE — PROGRESS NOTES
[facility] Daily Progress     Patient Name: Chaim Villanueva  Date: 3/15/2018  Visit #: 4  PTA visit #:  na  Referral Diagnosis: Lumbar radiculitis  Referring provider: Callie Echavarria  Visit Diagnosis:     ICD-10-CM    1. Acute bilateral low back pain with right-sided sciatica M54.41    2. Generalized muscle weakness M62.81    3. Decreased ROM of lumbar spine M25.60    4. Decreased strength, endurance, and mobility R53.1     Z74.09    5. Unsteadiness on feet R26.81          Assessment:     HEP/POC compliance is  fair .  Response to Intervention Improved HEP compliance and no pain reports in low back with exercise progression.  Patient is benefitting from skilled physical therapy and is making steady progress toward functional goals.  Patient is appropriate to continue with skilled physical therapy intervention, as indicated by initial plan of care.       PMH: obesity, coronary artery disease status post stenting, heart failure status post ICD placement, hypertension, hyperlipidemia, GERD, FLO, R Knee OA    Goal Status: on-going  Pt. will be independent with home exercise program in : 4 weeks (to self-manage and improve function)  Pt. will be able to walk : 20 minutes;for exercise/recreation;in 12 weeks;for community mobility;with less pain (with less than 3/10 pain)  Patient will stand : 30 minutes;in 12 weeks;for home chores (with less than 3/10 pain)  Pt will: complete > 9 STS in 30 sec to reduce falls risk in 8 weeks.    Plan / Patient Education:     Continue with initial plan of care.  Progress with home program as tolerated. likely DC    Subjective:     Pain Ratin L posterior buttock and across low back   Pt lost 18# and feels great. Wants to lose another 20#. He has pain in his R knee but is going to get an injection soon. Is not taking the pills anymore for pain in his back. Trying to do the exercises every couple days- it usually depends on how he sleeps.    Objective:      Lumbar AROM:  Flexion: to mid-calf   Ext: mod  Rotation: R mild, L mild  Lateral flexion: R WNL L WNL    Modified Oswestry Low Back Pain Disablity Questionnaire  in %: 2    Able to easily squat and  item off floor    Exercises:  Exercise #1: LTR - HEP  Comment #1: isometric unilateral hip flexion in hooklying - HEP  Exercise #2: hip ext, abd-10x3 sec hold B  Comment #2: seated lumbar flexion 3x20 sec hold  Exercise #3: not added to HEP: rows with L3 band 20x, shoulder ext with L3 band 20x, multifidus walk outs 5x5 sec hold each direction   Comment #3: Standing feet together 3x30 sec hold and modified tandem stance 2x30 sec hold B  Exercise #4: jessica heel raises 30 reps  Comment #4: bridge 20 reps  Exercise #5: SLR 2x10 reps      Treatment Today     TREATMENT MINUTES COMMENTS   Evaluation     Self-care/ Home management     Manual therapy     Neuromuscular Re-education     Therapeutic Activity     Therapeutic Exercises 25 -see exercise flow sheet for date completed   Gait training     Modality__________________                Total 25    Blank areas are intentional and mean the treatment did not include these items.       Ekta Kelly, PT, DPT  3/15/2018

## 2021-06-16 NOTE — PROGRESS NOTES
[facility] Daily Progress / Discharge Summary    Patient Name: Chaim Dee  Date: 3/22/2018  Visit #: 5  PTA visit #: na  Referral Diagnosis: Lumbar radiculitis  Referring provider: Callie Echavarria  Visit Diagnosis:     ICD-10-CM    1. Acute bilateral low back pain with right-sided sciatica M54.41    2. Generalized muscle weakness M62.81    3. Decreased ROM of lumbar spine M25.60    4. Decreased strength, endurance, and mobility R53.1     Z74.09    5. Unsteadiness on feet R26.81          Assessment:   HEP/POC compliance is  good .  Response to Intervention Pt has met or progressing towards all goals. Demonstrates increased lumbar AROM, decreased pain, increased LE strength and improvements in function.  Patient has benefitted from skilled physical therapy and is making steady progress toward functional goals.    Goal Status:  Pt. will be independent with home exercise program in : 4 weeks;Met (to self-manage and improve function)  Pt. will be able to walk : 20 minutes;for exercise/recreation;in 12 weeks;for community mobility;with less pain;Progressing toward (with less than 3/10 pain, no pain but walking about 10 min)  Patient will stand : 30 minutes;in 12 weeks;for home chores;Progressing toward (with less than 3/10 pain, no pain but able to stand for 10 min)  Pt will: complete > 9 STS in 30 sec to reduce falls risk in 8 weeks. (MET)    Plan / Patient Education:     Initial plan of care has been completed. Patient has responded appropriately to skilled PT intervention.  The patient met goals and has demonstrated understanding of/independence in the home program for self-care and progression to next steps.  No further therapy is required at this time.  The patient will initiate contact if questions or concerns arise.    Subjective:     Pain Ratin  Pt has been doing really well. No pain into his back. Got an injection in his R knee that helped a lot. Feels he is walking faster.     Patient  Outcome Measures  Modified Oswestry Low Back Pain Disablity Questionnaire  in %: 2   Scores range from 0-100%, where a score of 0% represents minimal pain and maximal function. The minimal clinically important difference is a score reduction of 12%.      Objective:       Lumbar ROM:    Date: 2/21/2018 3/22/2018    *Indicate scale AROM AROM AROM   Lumbar Flexion To distal knees To mid-lower leg    Lumbar Extension mod mild     Right Left Right Left Right Left   Lumbar Sidebending mild mild WNL WNL     Lumbar Rotation mod mod mild mild     Thoracic Flexion      Thoracic Extension      Thoracic Sidebending         Thoracic Rotation           Lower Extremity Strength:     Date: 2/21/2018 3/22/2018    LE strength/5 Right Left Right Left Right Left   Hip Flexion (L1-3) 5 5 5 5     Hip Extension (L5-S1)         Hip Abduction (L4-5)         Hip Adduction (L2-3)         Hip External Rotation 4 4 4 4     Hip Internal Rotation         Knee Extension (L3-4) 4+ 5 5 5     Knee Flexion         Ankle Dorsiflexion (L4-5) 5 5 5 5     Great Toe Extension (L5) 5 5       Ankle Plantar flexion (S1) 2+ 2+       Abdominals          30 sec sit to stand: no UE support, 10 reps    Treatment Today  TREATMENT MINUTES COMMENTS   Evaluation     Self-care/ Home management     Manual therapy     Neuromuscular Re-education     Therapeutic Activity     Therapeutic Exercises 24 -see exercise flow sheet for date completed  -educated on discharge- will leave chart open for 1 month, continue with HEP 3-5x/per week to improve strength and maintain function   Gait training     Modality__________________                Total 24    Blank areas are intentional and mean the treatment did not include these items.     Ekta Kelly, PT, DPT  3/22/2018

## 2021-06-16 NOTE — PROGRESS NOTES
"  Office Visit - Follow Up   Chaim Dee   80 y.o. male    Date of Visit: 4/5/2021    Chief Complaint   Patient presents with     Annual Wellness Visit     fasting        Assessment and Plan   1. Chronic systolic heart failure (H)  Well compensated continue same  - HM2(CBC w/o Differential)  - Comprehensive Metabolic Panel    2. FLO - did not tolerate CPAP    3. Chronic coronary artery disease  Continue secondary prevention  - Lipid [place]    4. Angioedema, subsequent encounter  I strongly suspect this is related to nonsteroidal anti-inflammatory drugs.  No prior reaction with vaccine or vaccine components.  I encouraged him to get a Covid vaccination.  He we discussed need for emergent care if he does develop any angioedema    5. Morbid obesity (H)  The following high BMI interventions were performed this visit: encouragement to exercise and lifestyle education regarding diet    Return in about 3 months (around 7/5/2021) for annual physical.     History of Present Illness   This 80 y.o. old man comes in for follow-up.  He has had an annual wellness within the last year.  Overall feeling well.  Has not had Covid vaccine as he had Covid just over 3 months ago.  He has not been as active as he would like because of the isolation from the global coronavirus pandemic.  No chest pain or shortness of breath.  He is little bit anxious about getting the vaccine given his history of angioedema which has never been associated with the vaccine.    Review of Systems: A comprehensive review of systems was negative except as noted.     Medications, Allergies and Problem List   Reviewed, reconciled and updated  Post Discharge Medication Reconciliation Status:      Physical Exam   General Appearance:   No acute distress    /76 (Patient Site: Left Arm, Patient Position: Sitting, Cuff Size: Adult Regular)   Pulse 72   Ht 5' 10\" (1.778 m)   Wt (!) 275 lb 8 oz (125 kg)   SpO2 96%   BMI 39.53 kg/m      HEENT exam is " unremarkable  Cardiovascular regular rate and rhythm no murmur gallop or rub  Pulmonary lungs are clear to auscultation bilaterally  Gastrointestinal abdomen soft nontender nondistended no organomegaly  Neurologic exam is non focal  Psychiatric pleasant, no confusion or agitation        Additional Information   Current Outpatient Medications   Medication Sig Dispense Refill     acetaminophen (TYLENOL) 325 MG tablet Take 2 tablets (650 mg total) by mouth every 4 (four) hours as needed.  0     clopidogreL (PLAVIX) 75 mg tablet TAKE 1 TABLET DAILY 90 tablet 2     diphenhydrAMINE (BENADRYL) 25 mg tablet Take 25-50 mg by mouth every 6 (six) hours as needed.              famotidine (PEPCID) 20 MG tablet TAKE 1 TABLET TWICE A  tablet 3     fexofenadine (ALLEGRA) 180 MG tablet Take 1 tablet (180 mg total) by mouth daily. 90 tablet 3     hydroCHLOROthiazide (HYDRODIURIL) 25 MG tablet TAKE 1 TABLET DAILY (DUE FOR CLINIC VISIT PRIOR TO FURTHER REFILLS) 90 tablet 1     metoprolol succinate (TOPROL-XL) 100 MG 24 hr tablet TAKE 1 TABLET DAILY 90 tablet 3     nitroglycerin (NITROSTAT) 0.4 MG SL tablet Place 1 tablet (0.4 mg total) under the tongue as needed. Use as directed. 30 tablet 0     simvastatin (ZOCOR) 40 MG tablet TAKE 1 TABLET AT BEDTIME 90 tablet 3     No current facility-administered medications for this visit.      Allergies   Allergen Reactions     Ace Inhibitors Angioedema     Arb-Angiotensin Receptor Antagonist Angioedema     Aspirin      Angioedema - stopped aspirin, angioedema resolved, restarted recurred (fairly strong evidence)     Gabapentin Angioedema     Losartan Angioedema     Ramipril Angioedema     Seafood Nausea And Vomiting     Shellfish Containing Products Nausea And Vomiting     SHRIMP- worst reaction to.     Social History     Tobacco Use     Smoking status: Former Smoker     Smokeless tobacco: Never Used   Substance Use Topics     Alcohol use: Yes     Alcohol/week: 1.0 standard drinks      Types: 1 Glasses of wine per week     Comment: rare - only in [place]     Drug use: No       Review and/or order of clinical lab tests:  Review and/or order of radiology tests:  Review and/or order of medicine tests:  Discussion of test results with performing physician:  Decision to obtain old records and/or obtain history from someone other than the patient:  Review and summarization of old records and/or obtaining history from someone other than the patient and.or discussion of case with another health care provider:  Independent visualization of image, tracing or specimen itself:    Time:      Luis Elena MD

## 2021-06-16 NOTE — PATIENT INSTRUCTIONS - HE
After 2nd vaccine, if any swelling, take prednisone 40mg immediately along with Benadryl 2 tabs every 6 hours until swelling resolves.      Continue Allegra and Pepcid    Seek medical attention if swelling does not improve or any concern for breathing problems

## 2021-06-17 NOTE — PROGRESS NOTES
Office Visit - Follow Up   Chaim Dee   77 y.o. male    Date of Visit: 4/18/2018    Chief Complaint   Patient presents with     Hypertension        Assessment and Plan   1. 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  No longer on ACE inhibitor ARB because of angioedema.  At this point I think we can follow without this.  - Lipid Cascade  - Comprehensive Metabolic Panel  - Ambulatory referral to Cardiology    2. Chronic coronary artery disease  Continue secondary prevention aspirin statin    3. Essential hypertension  Pressure controlled with weight loss, hydrochlorothiazide, metoprolol    4. Gastroesophageal reflux disease without esophagitis  Stable, on Pepcid    5. Angioedema, initial encounter  Likely related to ACE inhibitor/ARB, saw Dr. Esquivel, see treatment plan    6. Obesity, unspecified classification, unspecified obesity type, unspecified whether serious comorbidity present  The following high BMI interventions were performed this visit: encouragement to exercise and lifestyle education regarding diet    Return in about 3 months (around 7/18/2018) for recheck.     History of Present Illness   This 77 y.o. old and comes in for follow-up.  Overall he is doing quite well.  No more issues of angioedema.  He did see Dr. Von Esquivel.  Since his last visit he has lost over 20 pounds and 30 pounds total.  He feels great.  Less back pain less leg pain.  He is able to cross his legs.  Blood pressure better, reducing sodium.  Reflux better feels great.    Review of Systems: A comprehensive review of systems was negative except as noted.     Medications, Allergies and Problem List   Reviewed and updated     Physical Exam   General Appearance:   No acute distress    /62 (Patient Site: Right Arm, Patient Position: Sitting, Cuff Size: Adult Regular)  Pulse 75  Ht 6' (1.829 m)  Wt (!) 289 lb (131.1 kg)  SpO2 96%  BMI  39.2 kg/m2    HEENT exam is unremarkable  Neck supple no thyromegaly or nodule palpable  Lymphatic no cervical lymphadenopathy  Cardiovascular regular rate and rhythm no murmur gallop or rub  Pulmonary lungs are clear to auscultation bilaterally  Gastrointestinall abdomen soft nontender nondistended no organomegaly  Neurologic exam is non focal  Psychiatric pleasant, no confusion or agitation        Additional Information   Current Outpatient Prescriptions   Medication Sig Dispense Refill     aspirin 81 MG EC tablet Take 81 mg by mouth at bedtime.        famotidine (PEPCID) 20 MG tablet Take 20 mg by mouth 2 (two) times a day.       hydroCHLOROthiazide (HYDRODIURIL) 25 MG tablet Take 25 mg by mouth daily.       metoprolol succinate (TOPROL-XL) 50 MG 24 hr tablet Take 150 mg by mouth daily.        nitroglycerin (NITROSTAT) 0.4 MG SL tablet Place 0.4 mg under the tongue as needed. Use as directed.       simvastatin (ZOCOR) 40 MG tablet Take 40 mg by mouth at bedtime.       triamcinolone (KENALOG) 0.1 % cream Apply topically 2 (two) times a day as needed.       No current facility-administered medications for this visit.      Allergies   Allergen Reactions     Ace Inhibitors Angiodema     Arb-Angiotensin Receptor Antagonist Angiodema     Gabapentin Angiodema     Losartan Angiodema     Ramipril Angiodema     Seafood      Shellfish Containing Products Nausea And Vomiting     SHRIMP- worst reaction to.     Shrimp      Social History   Substance Use Topics     Smoking status: Former Smoker     Smokeless tobacco: Never Used     Alcohol use 0.6 oz/week     1 Glasses of wine per week      Comment: rare - only in [place]       Review and/or order of clinical lab tests:  Review and/or order of radiology tests:  Review and/or order of medicine tests:  Discussion of test results with performing physician:  Decision to obtain old records and/or obtain history from someone other than the patient:  Review and summarization of old  records and/or obtaining history from someone other than the patient and.or discussion of case with another health care provider:  Independent visualization of image, tracing or specimen itself:    Time:      Luis Elena MD

## 2021-06-17 NOTE — PROGRESS NOTES
In clinic device check with Device RN and Dr. Cevallos.  Please see link for full device report.  Patient was informed of results and next follow up during today's visit.     [facility]'s Partnership Agreement for Device Patients was presented and reviewed with the patient at today's appointment.

## 2021-06-18 NOTE — LETTER
Letter by Corrie Verduzco AuD at      Author: Corrie Verduzco AuD Service: -- Author Type: --    Filed:  Encounter Date: 2/20/2019 Status: (Other)       [facility]- Audiology Benefit Letter    HESHAM LÓPEZ  1940  [address] [place] [address]    Insurance Company: Payor: MEDICARE / Plan: MEDICARE A AND B / Product Type: Medicare /      [place] Product: NO    ID # :  MARTHA GARCIA MEDICARE SUPPLEMENT   ID [id number]    Group#:  [id number]    Estimate of Benefits  Consult Visit Benefits:  MEDICARE 4/03 TRINIDADRG MARTHA QIANA 01/87 BRO    HAE, HAF, HAC Benefits:   COVERED SERVICE THRU MARTHA KIRAN LIMITED TO ONE PAIR EVERY 3 YEARS WITH A MAXIMUM OF $1500 PER DEVICE.    Batteries/Accessories Coverage:  NOT SPECIFIED IN THE PLAN    Representative name: SHRUTI KIRAN  Call reference: [phone]  Date of contact: 02/20/2019    Insurance verified today by [facility]    Additional Information:      The information provided is an estimate of benefits. This does not guarantee coverage; the insurance company will make the final determination of coverage to include patient responsibility of payment by the patient.   [facility] is not responsible for the decisions made by the insurance company regarding coverage.  Any changes to coverage (new plan or new policy) or procedures may void the contents provided in this letter.     Term Definitions  Patient responsibility: Can include but not limited to: co-pays, co-insurance, deductibles, out-of-pocket and non-covered and/or policy exclusions.

## 2021-06-18 NOTE — LETTER
Letter by Eddie Cevallos MD (Ted) at      Author: Eddie Cevallos MD (Ted) Service: -- Author Type: --    Filed:  Encounter Date: 1/4/2019 Status: (Other)       Chaim Dee  [address] [place] [address]      January 4, 2019      Dear Chaim,    This letter is to remind you that you will be due for your follow up appointment with Dr. Bucky Cevallos. To help ensure you are in the best health possible, a regular follow-up with your cardiologist is essential.     Please call our Patient Scheduling Line at [phone] to schedule your appointment at your earliest convenience.  If you have recently scheduled an appointment, please disregard this letter.    We look forward to seeing you again. As always, we are available at the number  above for any questions or concerns you may have.      Sincerely,   The Physicians and Staff of [facility]

## 2021-06-18 NOTE — LETTER
Letter by Luis Elena MD at      Author: Luis Elena MD Service: -- Author Type: --    Filed:  Encounter Date: 1/17/2019 Status: (Other)       Chaim Dee  [address] [place] [address]             January 17, 2019         Dear Mr. Dee,    Below are the results from your recent visit:    Resulted Orders   Comprehensive Metabolic Panel   Result Value Ref Range    Sodium 138 136 - 145 mmol/L    Potassium 3.4 (L) 3.5 - 5.0 mmol/L    Chloride 102 98 - 107 mmol/L    CO2 24 22 - 31 mmol/L    Anion Gap, Calculation 12 5 - 18 mmol/L    Glucose 90 70 - 125 mg/dL    BUN 16 8 - 28 mg/dL    Creatinine 0.90 0.70 - 1.30 mg/dL    GFR MDRD Af Amer >60 >60 mL/min/1.73m2    GFR MDRD Non Af Amer >60 >60 mL/min/1.73m2    Bilirubin, Total 2.5 (H) 0.0 - 1.0 mg/dL    Calcium 9.6 8.5 - 10.5 mg/dL    Protein, Total 6.8 6.0 - 8.0 g/dL    Albumin 4.0 3.5 - 5.0 g/dL    Alkaline Phosphatase 56 45 - 120 U/L    AST 20 0 - 40 U/L    ALT 21 0 - 45 U/L    Narrative    Fasting Glucose reference range is 70-99 mg/dL per  American Diabetes Association (ADA) guidelines.   Lipid Cascade   Result Value Ref Range    Cholesterol 125 <=199 mg/dL    Triglycerides 100 <=149 mg/dL    HDL Cholesterol 38 (L) >=40 mg/dL    LDL Calculated 67 <=129 mg/dL    Patient Fasting > 8hrs? Yes    Glycosylated Hemoglobin A1c   Result Value Ref Range    Hemoglobin A1c 5.7 3.5 - 6.0 %   Urinalysis-UC if Indicated   Result Value Ref Range    Color, UA Yellow Colorless, Yellow, Straw, Light Yellow    Clarity, UA Clear Clear    Glucose, UA Negative Negative    Bilirubin, UA Negative Negative    Ketones, UA Negative Negative    Specific Gravity, UA 1.025 1.005 - 1.030    Blood, UA Trace (!) Negative    pH, UA 5.5 5.0 - 8.0    Protein, UA Negative Negative mg/dL    Urobilinogen, UA 0.2 E.U./dL 0.2 E.U./dL, 1.0 E.U./dL    Nitrite, UA Negative Negative    Leukocytes, UA Negative Negative    Bacteria, UA None Seen None Seen hpf    RBC, UA 0-2  None Seen, 0-2 hpf    WBC, UA None Seen None Seen, 0-5 hpf    Squam Epithel, UA None Seen None Seen, 0-5 lpf    Mucus, UA Few (!) None Seen lpf    Sperm, UA Present (!) None Seen    Narrative    UC not indicated   Protein/Creatinine Ratio, Urine   Result Value Ref Range     Protein, Random Urine 11 mg/dL    Creatinine, Urine 155.0 mg/dL    Protein/Creatinine Ratio, Random Urine 0.07     Narrative    The reference ranges have not been established for  protein, creatinine and the protein/creatinine ratio  in random urines.  The results should be integrated  into the clinical context for interpretation.   Erythrocyte Sedimentation Rate   Result Value Ref Range    Sed Rate 2 0 - 15 mm/hr   C-Reactive Protein   Result Value Ref Range    CRP 0.2 0.0 - 0.8 mg/dL   HM1 (CBC with Diff)   Result Value Ref Range    WBC 8.2 4.0 - 11.0 thou/uL    RBC 5.25 4.40 - 6.20 mill/uL    Hemoglobin 16.7 14.0 - 18.0 g/dL    Hematocrit 49.5 40.0 - 54.0 %    MCV 94 80 - 100 fL    MCH 31.8 27.0 - 34.0 pg    MCHC 33.8 32.0 - 36.0 g/dL    RDW 12.2 11.0 - 14.5 %    Platelets 144 140 - 440 thou/uL    MPV 8.5 7.0 - 10.0 fL    Neutrophils % 72 (H) 50 - 70 %    Lymphocytes % 16 (L) 20 - 40 %    Monocytes % 9 2 - 10 %    Eosinophils % 3 0 - 6 %    Basophils % 1 0 - 2 %    Neutrophils Absolute 5.9 2.0 - 7.7 thou/uL    Lymphocytes Absolute 1.3 0.8 - 4.4 thou/uL    Monocytes Absolute 0.7 0.0 - 0.9 thou/uL    Eosinophils Absolute 0.2 0.0 - 0.4 thou/uL    Basophils Absolute 0.1 0.0 - 0.2 thou/uL       Labs all look okay, excellent    Please call with questions or contact us using [employer].    Sincerely,        Electronically signed by Luis Elena MD

## 2021-06-18 NOTE — PATIENT INSTRUCTIONS - HE
Patient Instructions by Luis Elena MD at 6/23/2020  9:00 AM     Author: Luis Elena MD Service: -- Author Type: Physician    Filed: 6/23/2020  8:29 AM Encounter Date: 6/23/2020 Status: Signed    : Luis Elena MD (Physician)         Patient Education     Your Health Risk Assessment indicates you feel you are not in good physical health.    A healthy lifestyle helps keep the body fit and the mind alert. It helps protect you from disease, helps you fight disease, and helps prevent chronic disease (disease that doesn't go away) from getting worse. This is important as you get older and begin to notice twinges in muscles and joints and a decline in the strength and stamina you once took for granted. A healthy lifestyle includes good healthcare, good nutrition, weight control, recreation, and regular exercise. Avoid harmful substances and do what you can to keep safe. Another part of a healthy lifestyle is stay mentally active and socially involved.    Good healthcare     Have a wellness visit every year.     If you have new symptoms, let us know right away. Don't wait until the next checkup.     Take medicines exactly as prescribed and keep your medicines in a safe place. Tell us if your medicine causes problems.   Healthy diet and weight control     Eat 3 or 4 small, nutritious, low-fat, high-fiber meals a day. Include a variety of fruits, vegetables, and whole-grain foods.     Make sure you get enough calcium in your diet. Calcium, vitamin D, and exercise help prevent osteoporosis (bone thinning).     If you live alone, try eating with others when you can. That way you get a good meal and have company while you eat it.     Try to keep a healthy weight. If you eat more calories than your body uses for energy, it will be stored as fat and you will gain weight.     Recreation   Recreation is not limited to sports and team events. It includes any activity that provides relaxation,  interest, enjoyment, and exercise. Recreation provides an outlet for physical, mental, and social energy. It can give a sense of worth and achievement. It can help you stay healthy.       Patient Education   Signs of Hearing Loss  Hearing loss is a problem shared by many people. In fact, it is one of the most common health conditions, particularly as people age. Most people over age 65 have some hearing loss, and by age 80, almost everyone does. Because hearing loss usually occurs slowly over the years, you may not realize your hearing ability has gotten worse.       Have your hearing checked  Contact your [facility] care provider if you:    Have to strain to hear normal conversation.    Have to watch other peoples faces very carefully to follow what theyre saying.    Need to ask people to repeat what theyve said.    Often misunderstand what people are saying.    Turn the volume of the television or radio up so high that others complain.    Feel that people are mumbling when theyre talking to you.    Find that the effort to hear leaves you feeling tired and irritated.    Notice, when using the phone, that you hear better with 1 ear than the other.    6391-1586 [employer]. [address] [place] [address]. All rights reserved. This information is not intended as a substitute for professional medical care. Always follow your healthcare professional's instructions.           Advance Directive  Patients advance directive was discussed and I am comfortable with the patients wishes.  Patient Education   Personalized Prevention Plan  You are due for the preventive services outlined below.  Your care team is available to assist you in scheduling these services.  If you have already completed any of these items, please share that information with your care team to update in your medical record.  Health Maintenance   Topic Date Due   ? TSH  1940   ? HEART FAILURE ACTION PLAN  1940   ?  PNEUMOCOCCAL IMMUNIZATION 65+ LOW/MEDIUM RISK (1 of 2 - PCV13) 04/28/2003   ? ZOSTER VACCINES (2 of 2) 12/01/2016   ? ALT  01/17/2020   ? BMP  05/06/2020   ? CBC  11/06/2020   ? LIPID  11/06/2020   ? FALL RISK ASSESSMENT  05/08/2021   ? MEDICARE ANNUAL WELLNESS VISIT  06/23/2021   ? ADVANCE CARE PLANNING  10/06/2021   ? TD 18+ HE  10/06/2026   ? INFLUENZA VACCINE RULE BASED  Completed

## 2021-06-19 NOTE — LETTER
Letter by Luis Elena MD at      Author: Luis Elena MD Service: -- Author Type: --    Filed:  Encounter Date: 5/1/2019 Status: (Other)         Chaim Dee  [address] [place] [address]             May 1, 2019         Dear Mr. Dee,    Below are the results from your recent visit:    Resulted Orders   Basic Metabolic Panel   Result Value Ref Range    Sodium 140 136 - 145 mmol/L    Potassium 3.6 3.5 - 5.0 mmol/L    Chloride 106 98 - 107 mmol/L    CO2 27 22 - 31 mmol/L    Anion Gap, Calculation 7 5 - 18 mmol/L    Glucose 96 70 - 125 mg/dL    Calcium 9.3 8.5 - 10.5 mg/dL    BUN 23 8 - 28 mg/dL    Creatinine 0.84 0.70 - 1.30 mg/dL    GFR MDRD Af Amer >60 >60 mL/min/1.73m2    GFR MDRD Non Af Amer >60 >60 mL/min/1.73m2    Narrative    Fasting Glucose reference range is 70-99 mg/dL per  American Diabetes Association (ADA) guidelines.       Labs look okay, excellent    Please call with questions or contact us using [employer].    Sincerely,        Electronically signed by Luis Elena MD

## 2021-06-19 NOTE — PROGRESS NOTES
Office Visit - Follow Up   Chaim Dee   78 y.o. male    Date of Visit: 7/11/2018    Chief Complaint   Patient presents with     Hypertension        Assessment and Plan   1. NYHA class 1 ischemic heart failure with reduced ejection fraction, EF 35-40%, s/p ICD  Patient appears euvolemic, continue metoprolol.  Not tolerant of ACE inhibitor or ARB due to angioedema, continue hydrochlorothiazide, follow-up with cardiology    2. Angioedema, initial encounter  [place] to be related to ACE inhibitor and ARB.  Developed some swelling of his tongue after steroid injection.  I encouraged him to use some Benadryl.  If he does get another steroid injection I recommend he take Benadryl before the injection and then for a couple days afterwards as well.    3. ICD (implantable cardioverter-defibrillator), single, in situ    4. Essential hypertension  Blood pressure controlled continue current medication    5. Mixed hyperlipidemia  Continue statin    6. Chronic coronary artery disease  Continue secondary prevention as above    7. Morbid obesity (H)  The following high BMI interventions were performed this visit: encouragement to exercise and lifestyle education regarding diet    Return in about 3 months (around 10/11/2018) for recheck.     History of Present Illness   This 78 y.o. old man comes in for follow-up.  Overall is been doing well.  He has lost some weight and a lot of his back pain is resolved.  He did get in injection into the right knee with steroid.  After this he developed some swelling of his tongue which was minimal.  He has not taken any Benadryl.  Seems to be resolving.  No airway compromise.  Breathing stable.  No chest pain.  He established in the cardiac clinic.  No lightheadedness or dizziness.  No chest pain or shortness of breath.    Review of Systems: A comprehensive review of systems was negative except as noted.     Medications, Allergies and Problem List   Reviewed and updated     Physical Exam    General Appearance:   No acute distress    /68 (Patient Site: Left Arm, Patient Position: Sitting, Cuff Size: Adult Regular)  Pulse 77  Ht 6' (1.829 m)  Wt (!) 285 lb (129.3 kg)  SpO2 97%  BMI 38.65 kg/m2    HEENT exam is full for mild swelling of the right side of his tongue  Neck supple no thyromegaly or nodule palpable  Lymphatic no cervical lymphadenopathy  Cardiovascular regular rate and rhythm no murmur gallop or rub  Pulmonary lungs are clear to auscultation bilaterally  Gastrointestinall abdomen soft nontender nondistended no organomegaly  Neurologic exam is non focal  Psychiatric pleasant, no confusion or agitation        Additional Information   Current Outpatient Prescriptions   Medication Sig Dispense Refill     aspirin 81 MG EC tablet Take 81 mg by mouth at bedtime.        famotidine (PEPCID) 20 MG tablet TAKE 1 TABLET TWICE A DAY (DUE FOR CLINIC VISIT PRIOR TO FURTHER REFILLS) 180 tablet 0     hydroCHLOROthiazide (HYDRODIURIL) 25 MG tablet TAKE 1 TABLET DAILY (DUE FOR CLINIC VISIT PRIOR TO FURTHER REFILLS) 90 tablet 0     metoprolol succinate (TOPROL-XL) 50 MG 24 hr tablet TAKE 3 TABLETS DAILY (DUE FOR CLINIC VISIT PRIOR TO FURTHER REFILLS) 270 tablet 0     nitroglycerin (NITROSTAT) 0.4 MG SL tablet Place 0.4 mg under the tongue as needed. Use as directed.       simvastatin (ZOCOR) 40 MG tablet TAKE 1 TABLET AT BEDTIME (DUE FOR CLINIC VISIT PRIOR TO FURTHER REFILLS) 90 tablet 0     triamcinolone (KENALOG) 0.1 % cream Apply topically 2 (two) times a day as needed.       No current facility-administered medications for this visit.      Allergies   Allergen Reactions     Ace Inhibitors Angiodema     Arb-Angiotensin Receptor Antagonist Angiodema     Gabapentin Angiodema     Losartan Angiodema     Ramipril Angiodema     Seafood      Shellfish Containing Products Nausea And Vomiting     SHRIMP- worst reaction to.     Shrimp      Social History   Substance Use Topics     Smoking status: Former  Smoker     Smokeless tobacco: Never Used     Alcohol use 0.6 oz/week     1 Glasses of wine per week      Comment: rare - only in [place]       Review and/or order of clinical lab tests:  Review and/or order of radiology tests:  Review and/or order of medicine tests:  Discussion of test results with performing physician:  Decision to obtain old records and/or obtain history from someone other than the patient:  Review and summarization of old records and/or obtaining history from someone other than the patient and.or discussion of case with another health care provider:  Independent visualization of image, tracing or specimen itself:    Time:      Luis Elena MD

## 2021-06-19 NOTE — LETTER
Letter by Yaquelin Xavier RDCS at      Author: Yaquelin Xavier RDCS Service: -- Author Type: --    Filed:  Encounter Date: 8/22/2019 Status: (Other)         Chaim Dee  [address] [place] [address]      August 22, 2019      Dear Mr. Dee,    RE: Remote Results    We are writing to you regarding your recent Remote ICD check from home. Your transmission was received successfully. Battery status is satisfactory at this time.     Your results are showing no significant changes.    Your next device appointment will be a remote check on November 25, 2019; this will occur automatically.    To schedule or reschedule, please call [phone] and press 1.    NOTE: If you would like to do an extra transmission, please call [phone] and press 3 to speak to a nurse BEFORE transmitting. This ensures that the Device Clinic staff is aware of the reason you are sending a transmission, and can follow-up with you after it has been reviewed.    We will be checking your implanted device from home (remotely) every three months unless otherwise instructed. We will need to see you in the clinic at least once a year. You may need to be seen in the clinic sooner depending on the results of your check.    Please be aware:    The follow-up schedule is like a Physician prescription.    Your remote monitor is paired to your specific implanted device.      Sincerely,    [facility]

## 2021-06-19 NOTE — LETTER
Letter by Luis Elena MD at      Author: Luis Elena MD Service: -- Author Type: --    Filed:  Encounter Date: 11/6/2019 Status: Signed         [address] [place] [address]             November 6, 2019         Dear Mr. Dee,    Below are the results from your recent visit:    Resulted Orders   HM2(CBC w/o Differential)   Result Value Ref Range    WBC 7.9 4.0 - 11.0 thou/uL    RBC 5.43 4.40 - 6.20 mill/uL    Hemoglobin 17.2 14.0 - 18.0 g/dL    Hematocrit 50.2 40.0 - 54.0 %    MCV 93 80 - 100 fL    MCH 31.7 27.0 - 34.0 pg    MCHC 34.2 32.0 - 36.0 g/dL    RDW 12.5 11.0 - 14.5 %    Platelets 152 140 - 440 thou/uL    MPV 7.9 7.0 - 10.0 fL   Basic Metabolic Panel   Result Value Ref Range    Sodium 138 136 - 145 mmol/L    Potassium 3.7 3.5 - 5.0 mmol/L    Chloride 102 98 - 107 mmol/L    CO2 26 22 - 31 mmol/L    Anion Gap, Calculation 10 5 - 18 mmol/L    Glucose 98 70 - 125 mg/dL    Calcium 9.4 8.5 - 10.5 mg/dL    BUN 18 8 - 28 mg/dL    Creatinine 0.91 0.70 - 1.30 mg/dL    GFR MDRD Af Amer >60 >60 mL/min/1.73m2    GFR MDRD Non Af Amer >60 >60 mL/min/1.73m2    Narrative    Fasting Glucose reference range is 70-99 mg/dL per  American Diabetes Association (ADA) guidelines.   Lipid Cascade   Result Value Ref Range    Cholesterol 116 <=199 mg/dL    Triglycerides 95 <=149 mg/dL    HDL Cholesterol 35 (L) >=40 mg/dL    LDL Calculated 62 <=129 mg/dL    Patient Fasting > 8hrs? Yes        Labs all look okay, excellent    Please call with questions or contact us using [employer].    Sincerely,        Electronically signed by Luis Elena MD

## 2021-06-19 NOTE — LETTER
Letter by Elen Hope EPS at      Author: Elen Hope EPS Service: -- Author Type: --    Filed:  Encounter Date: 5/21/2019 Status: (Other)         Chaim Dee  [address]  [place] [address]      May 21, 2019      Dear Mr. Dee    RE: Remote Results    We are writing to you regarding your recent Remote ICD check from home. Your transmission was received successfully. Battery status is satisfactory at this time.     Your results are being reviewed.  You will be contacted if further information is necessary.     Your next device appointment will be a remote check on August 22nd, 2019; this will occur automatically.    To schedule or reschedule, please call [phone] and press 1.    NOTE: If you would like to do an extra transmission, please call [phone] and press 3 to speak to a nurse BEFORE transmitting. This ensures that the Device Clinic staff is aware of the reason you are sending a transmission, and can follow-up with you after it has been reviewed.    We will be checking your implanted device from home (remotely) every three months unless otherwise instructed. We will need to see you in the clinic at least once a year. You may need to be seen in the clinic sooner depending on the results of your check.    Please be aware:    The follow-up schedule is like a Physician prescription.    Your remote monitor is paired to your specific implanted device.      Sincerely,    [facility]

## 2021-06-20 NOTE — LETTER
Letter by Luis Elena MD at      Author: Luis Elena MD Service: -- Author Type: --    Filed:  Encounter Date: 6/23/2020 Status: (Other)         Chaim Dee  [address] [place] [address]             June 23, 2020         Dear Mr. Dee,    Below are the results from your recent visit:    Resulted Orders   Thyroid Stimulating Hormone (TSH)   Result Value Ref Range    TSH 1.53 0.30 - 5.00 uIU/mL   Lipid Cascade   Result Value Ref Range    Cholesterol 120 <=199 mg/dL    Triglycerides 66 <=149 mg/dL    HDL Cholesterol 40 >=40 mg/dL    LDL Calculated 67 <=129 mg/dL    Patient Fasting > 8hrs? Yes    HM2(CBC w/o Differential)   Result Value Ref Range    WBC 6.9 4.0 - 11.0 thou/uL    RBC 5.07 4.40 - 6.20 mill/uL    Hemoglobin 16.6 14.0 - 18.0 g/dL    Hematocrit 47.4 40.0 - 54.0 %    MCV 93 80 - 100 fL    MCH 32.8 27.0 - 34.0 pg    MCHC 35.1 32.0 - 36.0 g/dL    RDW 12.5 11.0 - 14.5 %    Platelets 132 (L) 140 - 440 thou/uL    MPV 8.8 7.0 - 10.0 fL   Comprehensive Metabolic Panel   Result Value Ref Range    Sodium 139 136 - 145 mmol/L    Potassium 3.8 3.5 - 5.0 mmol/L    Chloride 105 98 - 107 mmol/L    CO2 26 22 - 31 mmol/L    Anion Gap, Calculation 8 5 - 18 mmol/L    Glucose 88 70 - 125 mg/dL    BUN 21 8 - 28 mg/dL    Creatinine 0.86 0.70 - 1.30 mg/dL    GFR MDRD Af Amer >60 >60 mL/min/1.73m2    GFR MDRD Non Af Amer >60 >60 mL/min/1.73m2    Bilirubin, Total 1.6 (H) 0.0 - 1.0 mg/dL    Calcium 9.3 8.5 - 10.5 mg/dL    Protein, Total 6.5 6.0 - 8.0 g/dL    Albumin 4.2 3.5 - 5.0 g/dL    Alkaline Phosphatase 50 45 - 120 U/L    AST 19 0 - 40 U/L    ALT 22 0 - 45 U/L    Narrative    Fasting Glucose reference range is 70-99 mg/dL per  American Diabetes Association (ADA) guidelines.       Labs okay/stable, excellent    Please call with questions or contact us using MyChart.    Sincerely,        Electronically signed by Luis Elena MD

## 2021-06-20 NOTE — LETTER
Letter by Chikis Douglass RN at      Author: Chikis Douglass RN Service: -- Author Type: --    Filed:  Encounter Date: 5/11/2020 Status: (Other)         Chaim Dee  [address] [place] [address]      May 11, 2020      Dear Mr. Dee,    RE: Remote Results    We are writing to you regarding your recent Remote ICD check from home. Your transmission was received successfully. Battery status is satisfactory at this time.     Your results are within normal limits.    Your next device appointment will be a remote check on 8/12/2020; this will occur automatically.    To schedule or reschedule, please call [phone] and press 1.    NOTE: If you would like to do an extra transmission, please call [phone] and press 3 to speak to a nurse BEFORE transmitting. This ensures that the Device Clinic staff is aware of the reason you are sending a transmission, and can follow-up with you after it has been reviewed.    We will be checking your implanted device from home (remotely) every three months unless otherwise instructed. We will need to see you in the clinic at least once a year. You may need to be seen in the clinic sooner depending on the results of your check.    Please be aware:    The follow-up schedule is like a Physician prescription.    Your remote monitor is paired to your specific implanted device.      Sincerely,    [facility] Device [facility]

## 2021-06-20 NOTE — LETTER
Letter by Sara Freitas at      Author: Sara Freitas Service: -- Author Type: --    Filed:  Encounter Date: 2/10/2020 Status: (Other)         Chaim Dee  [address] [place] [address]      February 10, 2020      Dear Mr. Dee    RE: Remote Results    We are writing to you regarding your recent Remote ICD check from home. Your transmission was received successfully. Battery status is satisfactory at this time.     Your results are showing no significant changes.    Your next device appointment will be a remote check on May 11, 2020; this will occur automatically.    To schedule or reschedule, please call [phone] and press 1.    NOTE: If you would like to do an extra transmission, please call [phone] and press 3 to speak to a nurse BEFORE transmitting. This ensures that the Device Clinic staff is aware of the reason you are sending a transmission, and can follow-up with you after it has been reviewed.    We will be checking your implanted device from home (remotely) every three months unless otherwise instructed. We will need to see you in the clinic at least once a year. You may need to be seen in the clinic sooner depending on the results of your check.    Please be aware:    The follow-up schedule is like a Physician prescription.    Your remote monitor is paired to your specific implanted device.      Sincerely,    [facility] Device [facility]

## 2021-06-21 NOTE — LETTER
Letter by Charito Baker RN at      Author: Charito Baker RN Service: -- Author Type: --    Filed:  Encounter Date: 3/5/2021 Status: (Other)         Chaim Dee  [address] [place] [address]      March 5, 2021      Dear Mr. Dee,  Our records show that you have an implantable cardioverter defibrillator (ICD) for your heart that is made by [employer]. We're writing to let you know that [employer] has sent out a product warning for their ICDs.   [employer] found that some of the ICDs may need the generator replaced earlier than expected. The chance that this may happen to your ICD is very low, estimated to be about 7 in every 10,000 devices.  When the ICD reaches replacement time, an alert tone will sound from the device. It's very important for you to make sure that you can hear the alert tone.  If you have any questions about the alert tone, we would be happy to schedule an appointment in our clinic to demonstrate the tone for you. It's also essential for you to use your Carelink remote home monitor. Your Carelink remote monitor will help your clinic to watch the battery power of your ICD.   We don't recommend replacing your ICD just because of this product advisory. However, once the alert tone has sounded, we do recommend replacement as soon as possible. If you or your family hear beeping tones from your device, or if your Carelink home remote transmissions are unsuccessful, please contact your device clinic as soon as possible.   Patient safety remains our number one priority and constant focus. In addition to the above recommendations, your own electrophysiologist may have further advice that is personalized for you.  Please don't hesitate to call your device clinic with any questions or concerns.  Sincerely,  [facility] - Electrophysiology Team    [facility] Device [facility]- [phone]  ([facility], [place], [place], [place] and [place])  [facility]  Device Clinic - [phone], Option 3  ([facility], [facility], [place], [place], [place])   [facility] Device Clinic - [phone]  ([facility], [facility], [facility], [facility])            04-Jan-2021

## 2021-06-21 NOTE — LETTER
Letter by Neri Black MD at      Author: Neri Black MD Service: -- Author Type: --    Filed:  Encounter Date: 11/24/2020 Status: (Other)         Chaim Dee  [address] [place] [address]             November 24, 2020         Dear Mr. Dee,    Below are the results from your recent visit:    Resulted Orders   COVID-19 Virus PCR MRF   Result Value Ref Range    COVID-19 VIRUS SPECIMEN SOURCE Nasopharyngeal     2019-nCOV Detected, Abnormal Result (!!)       Comment:      Positive for 2019-nCoV.  Patient sample was heat inactivated and amplified using the HDPCR SARS-CoV-2  assay ([employer] The HDPCRTM SARS-CoV-2 assay is a reverse  transcription real-time polymerase chain reaction (qRT-PCR) test intended for  the qualitative detection of nucleic acid  from SARS-CoV-2 in human nasopharyngeal swabs, oropharyngeal swabs, anterior  nasal swabs, mid-turbinate nasal swabs as well as nasal aspirate, nasal wash,  and bronchoalveolar lavage (BAL) specimens from individuals who are suspected  of COVID-19 by their healthcare provider.  Positive results should also be reported in accordance with local, state, and  federal regulations.  Nasopharyngeal specimen is the preferred choice for swab-based SARS CoV2  testing. When collection of a nasopharyngeal swab is not possible the following  are acceptable alternatives:  an oropharyngeal (OP) specimen collected by a healthcare professional, or a  nasal mid-turbinate (NMT) swab collected by a healthcare  professional or by  onsite self-collection (using a flocked tapered swab), or an anterior nares  specimen collected by a healthcare professional or by onsite self-collection  (using a round foam swab). (Centers for Disease Control)  Testing performed by [facility] ([facility]) [address] [facility] [address]  The test performance characteristics were determined by [facility].  It has not been  cleared or approved by the FDA.  The laboratory is regulated under the Clinical Laboratory Improvement  Amendments of 1988 (CLIA-88) as qualified to perform high-complexity testing.  This test is used for clinical purposes. It should not be regarded as  investigational or for research.    Performed and/or entered by:  [address] [address]        You have COVID. Please exercise necessary precautions as outlined by the Department of Health protocol. Hopefully you were contacted by the [facility] for this.     Please call with questions or contact us using [employer].    Sincerely,        Electronically signed by Neri Black MD

## 2021-06-21 NOTE — ANESTHESIA PREPROCEDURE EVALUATION
Anesthesia Evaluation      Patient summary reviewed   No history of anesthetic complications     Airway   Mallampati: II   Pulmonary - negative ROS and normal exam   (+) sleep apnea on CPAP, ,                          Cardiovascular - normal exam  Exercise tolerance: > or = 4 METS  (+) pacemaker, hypertension, CAD, cardiomyopathy, hypercholesterolemia,     ECG reviewed (Normal sinus rhythm, inc RBBB, old AWMI, old IMI)  Rhythm: regular  Rate: normal,      ROS comment: 5/18 TTE  No previous study for comparison.  Normal right ventricular size and systolic function.  Left ventricle ejection fraction is mildly decreased. The estimated left ventricular ejection fraction is 50%.  Distal septal and apical hypokinesis  No hemodynamically significant valvular heart abnormalities.     Neuro/Psych - negative ROS     Endo/Other - negative ROS      GI/Hepatic/Renal    (+) GERD well controlled,     Chronic renal disease: history of kidney stones.     Other findings: Results for HESHAM LÓPEZ (MRN [id number]) as of 11/9/2018 10:24    11/7/2018 13:10  Sodium: 139  Potassium: 3.5  Chloride: 102  CO2: 23  Anion Gap, Calculation: 14  BUN: 25  Creatinine: 0.92  GFR MDRD Af Amer: >60  GFR MDRD Non Af Amer: >60  Calcium: 9.5  AST: 23  ALT: 21  ALBUMIN: 3.8  Protein, Total: 6.8  Alkaline Phosphatase: 47  Bilirubin, Total: 2.4 (H)  Glucose: 116  WBC: 13.0 (H)  RBC: 5.15  Hemoglobin: 16.4  Hematocrit: 49.0  MCV: 95  MCH: 31.9  MCHC: 33.6  RDW: 11.7  Platelets: 162   Swollen lower lip - was bad 4 AM. Now much improved. No tongue swelling.      Dental    (+) upper dentures and poor dentition                       Anesthesia Plan  Planned anesthetic: general endotracheal  GAETT  Antiemetics  Tylenol po pre op  Toradol at the end of case if okay with surgeon  Soft bite block    For current minor lip swelling (related to angioedema):  Steroids pre op 100 solucortef  Pepcid pre op 20mg  Benadryl 25mg pre op    Glidescope in the unlikely  event of airway swelling  ASA 3   Induction: intravenous   Anesthetic plan and risks discussed with: patient  Anesthesia plan special considerations: video-assisted,   Post-op plan: routine recovery

## 2021-06-21 NOTE — LETTER
Letter by Luis Elena MD at      Author: Luis Elena MD Service: -- Author Type: --    Filed:  Encounter Date: 4/15/2021 Status: (Other)         April 15, 2021     Patient: Chaim Dee   YOB: 1940   Date of Visit: 4/15/2021       To Whom it May Concern:    Chaim Dee was seen in my clinic on 4/15/2021.  He has a history of angioedema and developed angioedema 2 days after the first COVID vaccination with Pfizer.  Rod Villanueva (Chaim Dee) and I have discussed the risk benefit of proceeding with the second vaccination and he has decided that he would like to get the second vaccination.  I support this decision and he has a plan in place in the event that he develops recurrent angioedema after the second vaccination.  He will have people to monitor and assist him for two weeks after the vaccination, as he lives in community.    If you have any questions or concerns, please don't hesitate to call.    Sincerely,         Electronically signed by Luis Elena MD

## 2021-06-21 NOTE — LETTER
Letter by Luis Elena MD at      Author: Luis Elena MD Service: -- Author Type: --    Filed:  Encounter Date: 4/5/2021 Status: (Other)         Chaim Dee  [address] [place] [address] [address]  [place] [address]             April 5, 2021         Dear Rod Villanueva    Below are the results from your recent visit:    Resulted Orders   HM2(CBC w/o Differential)   Result Value Ref Range    WBC 6.5 4.0 - 11.0 thou/uL    RBC 5.33 4.40 - 6.20 mill/uL    Hemoglobin 16.5 14.0 - 18.0 g/dL    Hematocrit 50.0 40.0 - 54.0 %    MCV 94 80 - 100 fL    MCH 31.0 27.0 - 34.0 pg    MCHC 33.0 32.0 - 36.0 g/dL    RDW 13.9 11.0 - 14.5 %    Platelets 136 (L) 140 - 440 thou/uL    MPV 10.3 (H) 7.0 - 10.0 fL   Comprehensive Metabolic Panel   Result Value Ref Range    Sodium 139 136 - 145 mmol/L    Potassium 4.2 3.5 - 5.0 mmol/L    Chloride 103 98 - 107 mmol/L    CO2 29 22 - 31 mmol/L    Anion Gap, Calculation 7 5 - 18 mmol/L    Glucose 91 70 - 125 mg/dL    BUN 21 8 - 28 mg/dL    Creatinine 0.96 0.70 - 1.30 mg/dL    GFR MDRD Af Amer >60 >60 mL/min/1.73m2    GFR MDRD Non Af Amer >60 >60 mL/min/1.73m2    Bilirubin, Total 1.8 (H) 0.0 - 1.0 mg/dL    Calcium 9.1 8.5 - 10.5 mg/dL    Protein, Total 6.6 6.0 - 8.0 g/dL    Albumin 4.3 3.5 - 5.0 g/dL    Alkaline Phosphatase 51 45 - 120 U/L    AST 20 0 - 40 U/L    ALT 19 0 - 45 U/L    Narrative    Fasting Glucose reference range is 70-99 mg/dL per  American Diabetes Association (ADA) guidelines.   Lipid Cascade   Result Value Ref Range    Cholesterol 126 <=199 mg/dL    Triglycerides 72 <=149 mg/dL    HDL Cholesterol 42 >=40 mg/dL    LDL Calculated 70 <=129 mg/dL    Patient Fasting > 8hrs? Yes        Your labs all look okay, excellent    Please call with questions or contact us using [employer].    Sincerely,        Electronically signed by Luis Elena MD

## 2021-06-21 NOTE — PROGRESS NOTES
Office Visit - Follow Up   Chaim Dee   78 y.o. male    Date of Visit: 11/7/2018    Chief Complaint   Patient presents with     Hospital Visit Follow Up        Assessment and Plan   1. Preoperative examination  This is a patient with acute cholecystitis and a gallstone in the gallbladder neck.  This was diagnosed on 11/5/2018 and he became symptomatic in the emergency room and was therefore discharged home for close follow-up.  He currently is asymptomatic.  This is a intermediate risk surgery and he has increased risk for major cardiac complications given his history of myocardial infarction and congestive heart failure.  Please note his history of obstructive sleep apnea, not on CPAP.  He is on an aspirin and a beta-blocker.  - Electrocardiogram Perform - Clinic    2. Acute cholecystitis  As above, we will start him on levofloxacin and metronidazole especially given his acute scrotal swelling.  He will see Dr. Mark Garrido tomorrow.  - HM2(CBC w/o Differential)  - Comprehensive Metabolic Panel  - Ambulatory referral to General Surgery    3. NYHA class 1 ischemic heart failure with reduced ejection fraction, EF 35-40%, s/p ICD  No evidence of failure  - Electrocardiogram Perform - Clinic    4. FLO - did not tolerate CPAP    5. Essential hypertension  Pressure controlled    6. Angioedema, initial encounter  Etiology uncertain, has been evaluated, has seen allergy Benadryl as needed    7. Chronic coronary artery disease  Continue secondary prevention    8. ICD (implantable cardioverter-defibrillator), single, in situ    9. Scrotal swelling  Etiology uncertain, unlikely to be infectious but given possibility acute cholecystitis, start antibiotics.  Will obtain an ultrasound.  His exam is not at all suggestive of ascites.  I wonder if he has idiopathic scrotal edema, possibly related to his angioedema.  I recommended ice and elevation we will have him see urology if this does not improve promptly  -  Urinalysis-UC if Indicated  - US Scrotum and Testicles W Duplex Ltd; Future    Return in about 1 week (around 11/14/2018) for recheck.     History of Present Illness   This 78 y.o. old man comes in for post emergency room follow-up.  He was seen in [facility] on 11/5/2018.  He woke up with sudden onset of epigastric and right upper quadrant pain.  This radiated to his back.  He went to the emergency room.  He had a CT scan which showed acute cholecystitis and a stone in the neck of the gallbladder.  He received pain medication and his symptoms resolved.  He was discharged to home and asked to follow-up promptly.  At the time his liver tests were normal including bilirubin.  His white blood cell count was 10,000.  His creatinine was normal at 1.  Since discharge from the emergency room he has felt well.  He did have some tongue swelling which she attributes to the IV contrast.  This is a frequent problem for him and he took some Benadryl and his symptoms resolved.  He currently feels well and has no complaints.  He has been losing weight recently through diet    Addendum: A few hours after his initial exam return to clinic and had acute onset of scrotal swelling.  He has no significant pain.  No fever.  No abdominal distention.  No leg swelling.    Review of Systems: A comprehensive review of systems was negative except as noted.     Medications, Allergies and Problem List   Reviewed, reconciled and updated     Physical Exam   General Appearance:   No acute distress    /64 (Patient Site: Left Arm, Patient Position: Sitting, Cuff Size: Adult Regular)  Pulse 98  Ht 6' (1.829 m)  Wt (!) 276 lb (125.2 kg)  SpO2 94%  BMI 37.43 kg/m2    HEENT exam is unremarkable  Neck supple no thyromegaly or nodule palpable  Lymphatic no cervical lymphadenopathy  Cardiovascular regular rate and rhythm no murmur gallop or rub  Pulmonary lungs are clear to auscultation bilaterally  Gastrointestinall abdomen soft  nontender nondistended no organomegaly  Neurologic exam is non focal  Psychiatric pleasant, no confusion or agitation    Addendum: Scrotum swollen, penis swollen, no tenderness of testicles or epididymis he has no abdominal ascites appreciated on exam and no right upper quadrant tenderness       Additional Information   Current Outpatient Prescriptions   Medication Sig Dispense Refill     aspirin 81 MG EC tablet Take 81 mg by mouth at bedtime.        diphenhydrAMINE (BENADRYL) 25 mg tablet Take 25 mg by mouth as needed for sleep (tongue swelling).       famotidine (PEPCID) 20 MG tablet Take 1 tablet (20 mg total) by mouth 2 (two) times a day. 180 tablet 2     hydroCHLOROthiazide (HYDRODIURIL) 25 MG tablet TAKE 1 TABLET DAILY (DUE FOR CLINIC VISIT PRIOR TO FURTHER REFILLS) 90 tablet 1     metoprolol succinate (TOPROL-XL) 50 MG 24 hr tablet TAKE 3 TABLETS DAILY (DUE FOR CLINIC VISIT PRIOR TO FURTHER REFILLS) 270 tablet 1     nitroglycerin (NITROSTAT) 0.4 MG SL tablet Place 0.4 mg under the tongue as needed. Use as directed.       simvastatin (ZOCOR) 40 MG tablet Take 1 tablet (40 mg total) by mouth at bedtime. 90 tablet 1     triamcinolone (KENALOG) 0.1 % cream Apply topically 2 (two) times a day as needed.       levoFLOXacin (LEVAQUIN) 500 MG tablet Take 1 tablet (500 mg total) by mouth daily for 10 days. 10 tablet 0     metroNIDAZOLE (FLAGYL) 500 MG tablet Take 1 tablet (500 mg total) by mouth 2 (two) times a day for 10 days. 20 tablet 0     No current facility-administered medications for this visit.      Allergies   Allergen Reactions     Ace Inhibitors Angioedema     Arb-Angiotensin Receptor Antagonist Angioedema     Gabapentin Angioedema     Losartan Angioedema     Ramipril Angioedema     Seafood      Shellfish Containing Products Nausea And Vomiting     SHRIMP- worst reaction to.     Shrimp      Social History   Substance Use Topics     Smoking status: Former Smoker     Smokeless tobacco: Never Used     Alcohol  use 0.6 oz/week     1 Glasses of wine per week      Comment: rare - only in [place]       Review and/or order of clinical lab tests:  Review and/or order of radiology tests:  Review and/or order of medicine tests:  Discussion of test results with performing physician:  Decision to obtain old records and/or obtain history from someone other than the patient:  Review and summarization of old records and/or obtaining history from someone other than the patient and.or discussion of case with another health care provider:  Independent visualization of image, tracing or specimen itself:    Time:      Luis Elena MD

## 2021-06-21 NOTE — LETTER
Letter by Sara Freitas at      Author: Sara Freitas Service: -- Author Type: --    Filed:  Encounter Date: 11/11/2020 Status: (Other)         Chaim Dee  [address] [place] [address]      November 11, 2020      Dear Mr. [place]    RE: Remote Results    We are writing to you regarding your recent Remote ICD check from home. Your transmission was received successfully. Battery status is satisfactory at this time.     Your results are showing no significant changes.    Your next device appointment will be a remote check on February 10, 2021; this will occur automatically.    To schedule or reschedule, please call [phone] and press 1.    NOTE: If you would like to do an extra transmission, please call [phone] and press 3 to speak to a nurse BEFORE transmitting. This ensures that the Device Clinic staff is aware of the reason you are sending a transmission, and can follow-up with you after it has been reviewed.    We will be checking your implanted device from home (remotely) every three months unless otherwise instructed. We will need to see you in the clinic at least once a year. You may need to be seen in the clinic sooner depending on the results of your check.    Please be aware:    The follow-up schedule is like a Physician prescription.    Your remote monitor is paired to your specific implanted device.      Sincerely,    [facility]

## 2021-06-21 NOTE — PROGRESS NOTES
Office Visit - Follow Up   Chaim Dee   78 y.o. male    Date of Visit: 10/17/2018    Chief Complaint   Patient presents with     Hyperlipidemia     3 mo f/u - fasting      Hospital Visit Follow Up     ER abdominal pain f/u - 10/13/18; doing better for the most part         Assessment and Plan   1. Mixed hyperlipidemia  Continue statin  - Lipid Cascade    2. Screening for diabetes mellitus  - Glucose    3. Essential hypertension  Pressure controlled continue current medication    4. Chronic coronary artery disease  Continue secondary prevention    5. NYHA class 1 ischemic heart failure with reduced ejection fraction, EF 35-40%, s/p ICD  Well compensated, continue current medication    6. Lower abdominal pain  I suspect this is a gastroenteritis with subsequent diarrhea.  Location and quality of pain as well as imaging and laboratory findings do not support a diagnosis of Elvin colic at this point    7. Calculus of gallbladder without cholecystitis without obstruction  As above    8. Angioedema, initial encounter  Mild episode resolved with Benadryl etiology uncertain    9. Morbid obesity (H)  The following high BMI interventions were performed this visit: encouragement to exercise and lifestyle education regarding diet    Return in about 3 months (around 1/17/2019) for recheck.     History of Present Illness   Mother Rich comes in today for follow-up.  He is due for a 3-month follow-up and also emergency room follow-up.  He was seen in the emergency room on 10/13/2018.  He had lower abdominal pain and cramping.  He had an ultrasound which showed a solitary gallstone without any other abnormality.  All of his laboratory evaluation was normal.  His pain resolved in the emergency room and he was discharged home.  Since then he has had frequent loose stools but abdominal pain and cramping has resolved.  No fever or chills.  Generally feeling well.  He continues to lose weight through watching calories.  He is  down 10 pounds from last visit.  He feels well.  He did have an episode of mild tongue swelling a few weeks ago, unknown cause    Review of Systems: A comprehensive review of systems was negative except as noted.     Medications, Allergies and Problem List   Reviewed, reconciled and updated     Physical Exam   General Appearance:   No acute distress    /70 (Patient Site: Left Arm, Patient Position: Sitting, Cuff Size: Adult Regular)  Pulse 90  Ht 6' (1.829 m)  Wt (!) 275 lb (124.7 kg)  SpO2 97%  BMI 37.3 kg/m2    HEENT exam is unremarkable  Neck supple no thyromegaly or nodule palpable  Lymphatic no cervical lymphadenopathy  Cardiovascular regular rate and rhythm no murmur gallop or rub  Pulmonary lungs are clear to auscultation bilaterally  Gastrointestinall abdomen soft nontender nondistended no organomegaly  Neurologic exam is non focal  Psychiatric pleasant, no confusion or agitation        Additional Information   Current Outpatient Prescriptions   Medication Sig Dispense Refill     aspirin 81 MG EC tablet Take 81 mg by mouth at bedtime.        famotidine (PEPCID) 20 MG tablet Take 1 tablet (20 mg total) by mouth 2 (two) times a day. 180 tablet 2     hydroCHLOROthiazide (HYDRODIURIL) 25 MG tablet TAKE 1 TABLET DAILY (DUE FOR CLINIC VISIT PRIOR TO FURTHER REFILLS) 90 tablet 1     metoprolol succinate (TOPROL-XL) 50 MG 24 hr tablet TAKE 3 TABLETS DAILY (DUE FOR CLINIC VISIT PRIOR TO FURTHER REFILLS) 270 tablet 1     nitroglycerin (NITROSTAT) 0.4 MG SL tablet Place 0.4 mg under the tongue as needed. Use as directed.       simvastatin (ZOCOR) 40 MG tablet Take 1 tablet (40 mg total) by mouth at bedtime. 90 tablet 1     triamcinolone (KENALOG) 0.1 % cream Apply topically 2 (two) times a day as needed.       diphenhydrAMINE (BENADRYL) 25 mg tablet Take 25 mg by mouth as needed for sleep (tongue swelling).       No current facility-administered medications for this visit.      Allergies   Allergen Reactions      Ace Inhibitors Angioedema     Arb-Angiotensin Receptor Antagonist Angioedema     Gabapentin Angioedema     Losartan Angioedema     Ramipril Angioedema     Seafood      Shellfish Containing Products Nausea And Vomiting     SHRIMP- worst reaction to.     Shrimp      Social History   Substance Use Topics     Smoking status: Former Smoker     Smokeless tobacco: Never Used     Alcohol use 0.6 oz/week     1 Glasses of wine per week      Comment: rare - only in [place]       Review and/or order of clinical lab tests:  Review and/or order of radiology tests:  Review and/or order of medicine tests:  Discussion of test results with performing physician:  Decision to obtain old records and/or obtain history from someone other than the patient:  Review and summarization of old records and/or obtaining history from someone other than the patient and.or discussion of case with another health care provider:  Independent visualization of image, tracing or specimen itself:    Time:      Luis Elena MD

## 2021-06-21 NOTE — ANESTHESIA CARE TRANSFER NOTE
Last vitals:   Vitals:    11/09/18 1348   BP: 146/71   Pulse: 68   Resp: 20   Temp: 36.6  C (97.8  F)   SpO2: 94%     Patient's level of consciousness is awake  Spontaneous respirations: yes  Maintains airway independently: yes  Dentition unchanged: yes  Oropharynx: oropharynx clear of all foreign objects    QCDR Measures:  ASA# 20 - Surgical Safety Checklist: WHO surgical safety checklist completed prior to induction  PQRS# 430 - Adult PONV Prevention: 4558F - Pt received => 2 anti-emetic agents (different classes) preop & intraop  ASA# 8 - Peds PONV Prevention: NA - Not pediatric patient, not GA or 2 or more risk factors NOT present  PQRS# 424 - Lori-op Temp Management: normal   PQRS# 426 - PACU Transfer Protocol: - Transfer of care checklist used  ASA# 14 - Acute Post-op Pain: ASA14B - Patient did NOT experience pain >= 7 out of 10

## 2021-06-21 NOTE — ANESTHESIA POSTPROCEDURE EVALUATION
Patient: Chaim Dee  #3  LAPAROSCOPIC CHOLECYSTECTOMY  Anesthesia type: general    Patient location: PACU  Last vitals:   Vitals:    11/09/18 1430   BP: 138/71   Pulse: 64   Resp: 14   Temp: 36.7  C (98  F)   SpO2: 95%     Post vital signs: stable  Level of consciousness: awake and responds to simple questions  Post-anesthesia pain: pain controlled  Post-anesthesia nausea and vomiting: no  Pulmonary: unassisted, return to baseline  Cardiovascular: stable and blood pressure at baseline  Hydration: adequate  Anesthetic events: no    QCDR Measures:  ASA# 11 - Lori-op Cardiac Arrest: ASA11B - Patient did NOT experience unanticipated cardiac arrest  ASA# 12 - Lori-op Mortality Rate: ASA12B - Patient did NOT die  ASA# 13 - PACU Re-Intubation Rate: ASA13B - Patient did NOT require a new airway mgmt  ASA# 10 - Composite Anes Safety: ASA10A - No serious adverse event    Additional Notes:

## 2021-06-23 NOTE — PROGRESS NOTES
Office Visit - Follow Up   Chaim Dee   78 y.o. male    Date of Visit: 1/17/2019    Chief Complaint   Patient presents with     Hypertension        Assessment and Plan   1. NYHA class 1 heart failure with reduced ejection fraction (H)  Stable continue current medications  - Comprehensive Metabolic Panel    2. Essential hypertension  Controlled continue current medication  - Urinalysis-UC if Indicated  - Protein/Creatinine Ratio, Urine    3. Chronic coronary artery disease  Tinea secondary prevention  - Lipid Cascade    4. Chronic urticaria  Has angioedema which was likely related to ACE inhibitor/ARB and also now seems to be having hives without any angioedema.  Mild elevation in eosinophils to 400.  Start fexofenadine in addition to famotidine and continue Benadryl as needed.  If they persist despite this I will ask him to follow-up with Dr. Von Esquivel.  - Erythrocyte Sedimentation Rate  - C-Reactive Protein  - HM1(CBC and Differential)  - HM1 (CBC with Diff)    5. Angioedema, initial encounter  Above    6. FLO - did not tolerate CPAP    7. Hyperglycemia  - Glycosylated Hemoglobin A1c    8. Hearing loss, unspecified hearing loss type, unspecified laterality  - Ambulatory referral to ENT  - Ambulatory referral to Audiology    9. Morbid obesity (H)  The following high BMI interventions were performed this visit: encouragement to exercise and lifestyle education regarding diet    Return in about 3 months (around 4/17/2019) for recheck.     History of Present Illness   This 78 y.o. old man comes in for follow-up.  Overall is been doing well.  Since he had his gallbladder removed he has had issues with hives.  He has not had much lip swelling and no tongue swelling no breathing difficulty.  This comes on sporadically can be on his face arms legs and torso.  He takes Benadryl and they seem to resolve.  He has continued to take famotidine.  He is not taking other antihistamines.  He has not had to take  prednisone.  History of angioedema while on ACE inhibitor as well as ARB.  Otherwise feeling well weight stable through the holidays continuing to try and lose weight.    Review of Systems: A comprehensive review of systems was negative except as noted.     Medications, Allergies and Problem List   Reviewed, reconciled and updated     Physical Exam   General Appearance:   No acute distress    /58 (Patient Site: Left Arm, Patient Position: Sitting, Cuff Size: Adult Regular)   Pulse 74   Ht 6' (1.829 m)   Wt (!) 272 lb (123.4 kg)   SpO2 97%   BMI 36.89 kg/m      HEENT exam is unremarkable  Neck supple no thyromegaly or nodule palpable  Lymphatic no cervical lymphadenopathy  Cardiovascular regular rate and rhythm no murmur gallop or rub  Pulmonary lungs are clear to auscultation bilaterally  Gastrointestinall abdomen soft nontender nondistended no organomegaly  Neurologic exam is non focal  Psychiatric pleasant, no confusion or agitation        Additional Information   Current Outpatient Medications   Medication Sig Dispense Refill     acetaminophen (TYLENOL) 325 MG tablet Take 2 tablets (650 mg total) by mouth every 4 (four) hours as needed.  0     aspirin 81 MG EC tablet Take 81 mg by mouth at bedtime.        diphenhydrAMINE (BENADRYL) 25 mg tablet Take 25 mg by mouth as needed for sleep (tongue swelling).       famotidine (PEPCID) 20 MG tablet Take 1 tablet (20 mg total) by mouth 2 (two) times a day. 180 tablet 2     hydroCHLOROthiazide (HYDRODIURIL) 25 MG tablet TAKE 1 TABLET DAILY (DUE FOR CLINIC VISIT PRIOR TO FURTHER REFILLS) 90 tablet 1     metoprolol succinate (TOPROL-XL) 50 MG 24 hr tablet TAKE 3 TABLETS DAILY (DUE FOR CLINIC VISIT PRIOR TO FURTHER REFILLS) 270 tablet 1     nitroglycerin (NITROSTAT) 0.4 MG SL tablet Place 0.4 mg under the tongue as needed. Use as directed.       simvastatin (ZOCOR) 40 MG tablet Take 1 tablet (40 mg total) by mouth at bedtime. 90 tablet 1     fexofenadine (ALLEGRA)  60 MG tablet Take 1 tablet (60 mg total) by mouth 2 (two) times a day. 180 tablet 3     No current facility-administered medications for this visit.      Allergies   Allergen Reactions     Ace Inhibitors Angioedema     Arb-Angiotensin Receptor Antagonist Angioedema     Gabapentin Angioedema     Losartan Angioedema     Ramipril Angioedema     Seafood Nausea And Vomiting     Shellfish Containing Products Nausea And Vomiting     SHRIMP- worst reaction to.     Shrimp Nausea And Vomiting     Social History     Tobacco Use     Smoking status: Former Smoker     Smokeless tobacco: Never Used   Substance Use Topics     Alcohol use: Yes     Alcohol/week: 0.6 oz     Types: 1 Glasses of wine per week     Comment: rare - only in [place]     Drug use: No       Review and/or order of clinical lab tests:  Review and/or order of radiology tests:  Review and/or order of medicine tests:  Discussion of test results with performing physician:  Decision to obtain old records and/or obtain history from someone other than the patient:  Review and summarization of old records and/or obtaining history from someone other than the patient and.or discussion of case with another health care provider:  Independent visualization of image, tracing or specimen itself:    Time:      Luis Elena MD

## 2021-06-24 NOTE — PROGRESS NOTES
"Cardiology Progress Note    Assessment:  Coronary artery disease with remote history of anterior MI and stenting of LAD and circumflex in 1999, no angina  Ischemic cardiomyopathy with mildly depressed LV systolic function  Angioedema secondary to ACE inhibitor and ARB  AICD, normal function  Chronic systolic heart failure, well compensated no fluid overload  Hypercholesterolemia on simvastatin  Hypertension, good control  Obesity      Plan:  Continue current cardiac medications  I encouraged him to be more physically active    Follow-up in 1 year    Subjective:   This is 78 y.o. male who comes in today for follow-up visit.  He reports no new cardiac symptoms.  He denies chest pain or increasing shortness of breath.  His weight has been gradually declining.  He is tolerant of all cardiac medications.  He denies heart palpitations or AICD firing    Review of Systems:   General: WNL  Eyes: WNL  Ears/Nose/Throat: WNL  Lungs: WNL  Heart: WNL  Stomach: WNL  Bladder: WNL  Muscle/Joints: WNL  Skin: WNL  Nervous System: WNL  Mental Health: WNL     Blood: WNL    Objective:   /60 (Patient Site: Left Arm, Patient Position: Sitting, Cuff Size: Adult Large)   Pulse 88   Resp 16   Ht 6' 0.01\" (1.829 m)   Wt (!) 272 lb 6.4 oz (123.6 kg)   BMI 36.94 kg/m    Physical Exam:  GENERAL: no distress  NECK: No JVD  LUNGS: Clear to auscultation.  CARDIAC: regular rhythm, S1 & S2 normal.  No heaves, thrills, gallops or murmurs.  ABDOMEN: flat, negative hepatosplenomegaly, soft and non-tender.  EXTREMITIES: No evidence of cyanosis, clubbing or edema.    Current Outpatient Medications   Medication Sig Dispense Refill     acetaminophen (TYLENOL) 325 MG tablet Take 2 tablets (650 mg total) by mouth every 4 (four) hours as needed.  0     aspirin 81 MG EC tablet Take 81 mg by mouth at bedtime.        diphenhydrAMINE (BENADRYL) 25 mg tablet Take 25 mg by mouth as needed for sleep (tongue swelling).       famotidine (PEPCID) 20 MG tablet " Take 1 tablet (20 mg total) by mouth 2 (two) times a day. 180 tablet 2     hydroCHLOROthiazide (HYDRODIURIL) 25 MG tablet TAKE 1 TABLET DAILY (DUE FOR CLINIC VISIT PRIOR TO FURTHER REFILLS) 90 tablet 1     metoprolol succinate (TOPROL-XL) 50 MG 24 hr tablet TAKE 3 TABLETS DAILY (DUE FOR CLINIC VISIT PRIOR TO FURTHER REFILLS) 270 tablet 1     nitroglycerin (NITROSTAT) 0.4 MG SL tablet Place 0.4 mg under the tongue as needed. Use as directed.       simvastatin (ZOCOR) 40 MG tablet Take 1 tablet (40 mg total) by mouth at bedtime. 90 tablet 1     fexofenadine (ALLEGRA) 60 MG tablet Take 1 tablet (60 mg total) by mouth 2 (two) times a day. 180 tablet 3     No current facility-administered medications for this visit.        Cardiographics:    AICD interrogation: Normal function.  Thoracic impedance trending consistent with no significant fluid overload      Echo: May 2018    Normal right ventricular size and systolic function.    Left ventricle ejection fraction is mildly decreased. The estimated left ventricular ejection fraction is 50%.    Distal septal and apical hypokinesis    No hemodynamically significant valvular heart abnormalities.      Lab Results:       Lab Results   Component Value Date    CHOL 125 01/17/2019    CHOL 126 10/17/2018    CHOL 115 04/18/2018     Lab Results   Component Value Date    HDL 38 (L) 01/17/2019    HDL 36 (L) 10/17/2018    HDL 34 (L) 04/18/2018     Lab Results   Component Value Date    LDLCALC 67 01/17/2019    LDLCALC 72 10/17/2018    LDLCALC 63 04/18/2018     Lab Results   Component Value Date    TRIG 100 01/17/2019    TRIG 89 10/17/2018    TRIG 92 04/18/2018     No results found for: BNP    Eddie (Bucky)  MD Mart

## 2021-06-24 NOTE — PROGRESS NOTES
Hearing Aid Evaluation:    Referring Physician: Dr. 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 Chaim was fit with bilateral Fannie 3 Series i90 PRETTY BTE hearing aids with earmolds approximately 5 years ago. He reports he hasn't used the hearing aids for a long time because they made his voice sound hollow. Chaim has his Fannie hearing aids with him today. Medical clearance has been provided from Dr. Mayen.     Procedure: The options of reprogramming Chaim's current Fannie hearing aids or fitting new hearing aids are discussed. He decides he would like to have his current hearing aids refit with dome tips and reprogrammed. Chaim may decide later on to pursue new hearing aids. He is given an overview of hearing aid pricing, styles, and technology levels. Chaim's earmolds and built in receivers are removed from his Fannie aids. Replacement receivers are attached to the hearing aids along with large closed domes. The old hearing aid settings are saved on the computer. The hearing aids are reprogrammed using real-ear measures. The aids are able to meet NAL-NL2 gain targets for soft, medium, and loud speech. The high frequency gain is decreased by several decibels due to feedback and Chaim reporting a static sound. The overall gain is reduced by approximately 7 dB for patient comfort. He reports he no longer hears a hollow sound when he speaks, but his voice still sounds different. He is counseled on wearing the hearing aids consistently to get used to them.    Plan: Chaim plans to try out his old Fannie hearing aids again. He will contact the clinic if he decides to pursue new hearing aids. The patient verbalized understanding and is in agreement with this plan.    Silvia Mckeon, [facility]  [place] Licensed Audiologist #[id number]

## 2021-06-24 NOTE — PATIENT INSTRUCTIONS - HE
Chaim Dee,    It was a pleasure to see you today at the [facility].     My recommendations after this visit include:    Same medications    WMalinda Cevallos MD, FACC, CRUZITO

## 2021-06-24 NOTE — PROGRESS NOTES
Can we call the patient and let him know his CT scan looks entirely normal.  I would suggest a annual hearing test.

## 2021-06-24 NOTE — PROGRESS NOTES
Chaim Dee is a 78 y.o. male seen in consultation at the request of Dr. Elena for hearing loss.  Patient has noticed gradual hearing loss over several years.  He had Fannie aids fitted 5 years ago.  He denies being evaluated by a physician at the time and did not get an MRI, although he reports he cannot get one due to implanted defibrillator.  He notes he was frustrated with his devices and essentially has not used them.  Denies otologic history of infections or surgeries. Denies tinnitus and vertigo.  He reports history of fire arms in the past and would shoot off his left shoulder.  His father had history of hearing loss later in life.    ALLERGY:    Allergies   Allergen Reactions     Ace Inhibitors Angioedema     Arb-Angiotensin Receptor Antagonist Angioedema     Gabapentin Angioedema     Losartan Angioedema     Ramipril Angioedema     Seafood Nausea And Vomiting     Shellfish Containing Products Nausea And Vomiting     SHRIMP- worst reaction to.     Shrimp Nausea And Vomiting       MEDICATIONS:     Current Outpatient Medications on File Prior to Visit   Medication Sig Dispense Refill     acetaminophen (TYLENOL) 325 MG tablet Take 2 tablets (650 mg total) by mouth every 4 (four) hours as needed.  0     aspirin 81 MG EC tablet Take 81 mg by mouth at bedtime.        diphenhydrAMINE (BENADRYL) 25 mg tablet Take 25 mg by mouth as needed for sleep (tongue swelling).       famotidine (PEPCID) 20 MG tablet Take 1 tablet (20 mg total) by mouth 2 (two) times a day. 180 tablet 2     fexofenadine (ALLEGRA) 60 MG tablet Take 1 tablet (60 mg total) by mouth 2 (two) times a day. 180 tablet 3     hydroCHLOROthiazide (HYDRODIURIL) 25 MG tablet TAKE 1 TABLET DAILY (DUE FOR CLINIC VISIT PRIOR TO FURTHER REFILLS) 90 tablet 1     metoprolol succinate (TOPROL-XL) 50 MG 24 hr tablet TAKE 3 TABLETS DAILY (DUE FOR CLINIC VISIT PRIOR TO FURTHER REFILLS) 270 tablet 1     nitroglycerin (NITROSTAT) 0.4 MG SL tablet Place 0.4 mg  under the tongue as needed. Use as directed.       simvastatin (ZOCOR) 40 MG tablet Take 1 tablet (40 mg total) by mouth at bedtime. 90 tablet 1     No current facility-administered medications on file prior to visit.        Past Medical/Surgical History, Family History and Social History reviewed in detail and documented separately in the medical record.    Complete Review of Systems:  A 10-point review was performed.  Pertinent positives are noted in the HPI and on a separate scanned document in the chart.    EXAM:  There were no vitals filed for this visit.    Nurse documentation reviewed  and documented separately.    General Appearance: Pleasant, alert, appropriate appearance for age. No acute distress    Head Exam: Normal. Normocephalic, atraumatic.    Eye Exam: Normal external eye, conjunctiva, lids, cornea. Extra-ocular movements are intact.    Left external ear: normal  Left otoscopic exam: Normal EAC. Normal TM     Right external ear: normal  Right otoscopic exam: Normal EAC. Normal TM    Nose Exam: Normal external nose. Septum midline. Nasal mucosa normal.  Inferior turbinates normal.    OroPharynx Exam: Dental hygiene adequate. Normal tongue. Normal buccal mucosa. Normal palate.  Normal pharynx. Normal tonsils.    Neck Exam: Supple, no masses or nodes. Trachea and larynx midline.    Thyroid Exam: No tenderness, nodules or enlargement.    Salivary Glands: nontender without masses    Neuro: Alert and oriented times 3, CN 2-12 grossly intact, no nystagmus, PERRL, EOMI, normal speech and gait    Chest/Respiratory Exam: Normal chest wall motion and respiratory effort. No audible stridor or wheezing.    Cardiovascular Exam: Regular rate and rhythm.  No cyanosis, clubbing or edema.    Pulses: carotid pulses normal    Mood:  Calm, appropriate    Skin:  No conspicuous rash or lesion in the head and neck    ASSESSMENT:  1. ASNHL (asymmetrical sensorineural hearing loss)        PLAN: Findings, assessment, and  management options were discussed. Will get CT IAC without contrast as he cannot get an MRI and he might have had allergic reaction to CT contrast.  Recommend hearing aid evaluation and annual audiogram.  He is medically cleared for hearing aids.  Given his WRS, could be considered for at least baseline testing for CI consideration.

## 2021-06-25 NOTE — TELEPHONE ENCOUNTER
Refill Approved    Rx renewed per Medication Renewal Policy. Medication was last renewed on 9/6/18.    Latanya Rahman, [facility] Triage/Med Refill 3/18/2019     Requested Prescriptions   Pending Prescriptions Disp Refills     simvastatin (ZOCOR) 40 MG tablet [Pharmacy Med Name: SIMVASTATIN TABS 40MG] 90 tablet 1     Sig: TAKE 1 TABLET AT BEDTIME    Statins Refill Protocol (Hmg CoA Reductase Inhibitors) Passed - 3/15/2019  8:21 AM       Passed - PCP or prescribing provider visit in past 12 months     Last office visit with prescriber/PCP: 1/17/2019 Luis Elena MD OR same dept: 1/17/2019 Luis Elena MD OR same specialty: 1/17/2019 Luis Elena MD  Last physical: Visit date not found Last MTM visit: Visit date not found   Next visit within 3 mo: Visit date not found  Next physical within 3 mo: Visit date not found  Prescriber OR PCP: Luis Elena MD  Last diagnosis associated with med order: 1. Mixed hyperlipidemia  - simvastatin (ZOCOR) 40 MG tablet [Pharmacy Med Name: SIMVASTATIN TABS 40MG]; TAKE 1 TABLET AT BEDTIME  Dispense: 90 tablet; Refill: 1    2. Essential hypertension  - metoprolol succinate (TOPROL-XL) 50 MG 24 hr tablet [Pharmacy Med Name: METOPROLOL SUCC(TOPROL)ER TABS 50MG]; TAKE 3 TABLETS DAILY (DUE FOR CLINIC VISIT PRIOR TO FURTHER REFILLS)  Dispense: 270 tablet; Refill: 1  - hydroCHLOROthiazide (HYDRODIURIL) 25 MG tablet [Pharmacy Med Name: HYDROCHLOROTHIAZIDE TAB 25MG]; TAKE 1 TABLET DAILY (DUE FOR CLINIC VISIT PRIOR TO FURTHER REFILLS)  Dispense: 90 tablet; Refill: 1    If protocol passes may refill for 12 months if within 3 months of last provider visit (or a total of 15 months).             metoprolol succinate (TOPROL-XL) 50 MG 24 hr tablet [Pharmacy Med Name: METOPROLOL SUCC(TOPROL)ER TABS 50MG] 270 tablet 1     Sig: TAKE 3 TABLETS DAILY (DUE FOR CLINIC VISIT PRIOR TO FURTHER REFILLS)    Beta-Blockers Refill Protocol Passed - 3/15/2019  8:21 AM        Passed - PCP or prescribing provider visit in past 12 months or next 3 months    Last office visit with prescriber/PCP: 1/17/2019 Luis Elena MD OR same dept: 1/17/2019 Luis Elena MD OR same specialty: 1/17/2019 Luis Elena MD  Last physical: Visit date not found Last MTM visit: Visit date not found   Next visit within 3 mo: Visit date not found  Next physical within 3 mo: Visit date not found  Prescriber OR PCP: Luis Elena MD  Last diagnosis associated with med order: 1. Mixed hyperlipidemia  - simvastatin (ZOCOR) 40 MG tablet [Pharmacy Med Name: SIMVASTATIN TABS 40MG]; TAKE 1 TABLET AT BEDTIME  Dispense: 90 tablet; Refill: 1    2. Essential hypertension  - metoprolol succinate (TOPROL-XL) 50 MG 24 hr tablet [Pharmacy Med Name: METOPROLOL SUCC(TOPROL)ER TABS 50MG]; TAKE 3 TABLETS DAILY (DUE FOR CLINIC VISIT PRIOR TO FURTHER REFILLS)  Dispense: 270 tablet; Refill: 1  - hydroCHLOROthiazide (HYDRODIURIL) 25 MG tablet [Pharmacy Med Name: HYDROCHLOROTHIAZIDE TAB 25MG]; TAKE 1 TABLET DAILY (DUE FOR CLINIC VISIT PRIOR TO FURTHER REFILLS)  Dispense: 90 tablet; Refill: 1    If protocol passes may refill for 12 months if within 3 months of last provider visit (or a total of 15 months).            Passed - Blood pressure filed in past 12 months    BP Readings from Last 1 Encounters:   02/18/19 104/60             hydroCHLOROthiazide (HYDRODIURIL) 25 MG tablet [Pharmacy Med Name: HYDROCHLOROTHIAZIDE TAB 25MG] 90 tablet 1     Sig: TAKE 1 TABLET DAILY (DUE FOR CLINIC VISIT PRIOR TO FURTHER REFILLS)    Diuretics/Combination Diuretics Refill Protocol  Passed - 3/15/2019  8:21 AM       Passed - Visit with PCP or prescribing provider visit in past 12 months    Last office visit with prescriber/PCP: 1/17/2019 Luis Elena MD OR same dept: 1/17/2019 Luis Elena MD OR same specialty: 1/17/2019 Luis Elena MD  Last physical: Visit date not found Last MTM visit: Visit  date not found   Next visit within 3 mo: Visit date not found  Next physical within 3 mo: Visit date not found  Prescriber OR PCP: Luis Elena MD  Last diagnosis associated with med order: 1. Mixed hyperlipidemia  - simvastatin (ZOCOR) 40 MG tablet [Pharmacy Med Name: SIMVASTATIN TABS 40MG]; TAKE 1 TABLET AT BEDTIME  Dispense: 90 tablet; Refill: 1    2. Essential hypertension  - metoprolol succinate (TOPROL-XL) 50 MG 24 hr tablet [Pharmacy Med Name: METOPROLOL SUCC(TOPROL)ER TABS 50MG]; TAKE 3 TABLETS DAILY (DUE FOR CLINIC VISIT PRIOR TO FURTHER REFILLS)  Dispense: 270 tablet; Refill: 1  - hydroCHLOROthiazide (HYDRODIURIL) 25 MG tablet [Pharmacy Med Name: HYDROCHLOROTHIAZIDE TAB 25MG]; TAKE 1 TABLET DAILY (DUE FOR CLINIC VISIT PRIOR TO FURTHER REFILLS)  Dispense: 90 tablet; Refill: 1    If protocol passes may refill for 12 months if within 3 months of last provider visit (or a total of 15 months).            Passed - Serum Potassium in past 12 months     Lab Results   Component Value Date    Potassium 3.4 (L) 01/17/2019            Passed - Serum Sodium in past 12 months     Lab Results   Component Value Date    Sodium 138 01/17/2019            Passed - Blood pressure on file in past 12 months    BP Readings from Last 1 Encounters:   02/18/19 104/60            Passed - Serum Creatinine in past 12 months     Creatinine   Date Value Ref Range Status   01/17/2019 0.90 0.70 - 1.30 mg/dL Final

## 2021-06-26 ENCOUNTER — HEALTH MAINTENANCE LETTER (OUTPATIENT)
Age: 81
End: 2021-06-26

## 2021-06-26 NOTE — PROGRESS NOTES
Progress Notes by Eddie Cevallos MD (Ted) at 5/1/2018 10:50 AM     Author: Eddie Cevallos MD (Ted) Service: -- Author Type: Physician    Filed: 5/1/2018 11:38 AM Encounter Date: 5/1/2018 Status: Signed    : Eddie Cevallos MD (Ted) (Physician)           Click to link to [facility] HEART CARE NOTE    Thank you, Dr. Elena, for asking the [facility] to evaluate Mr. Chaim Dee.      Assessment/Recommendations   Assessment:    Coronary artery disease with remote history of anterior MI and stenting of LAD and circumflex, no angina in 1999  Ischemic cardiomyopathy with moderately depressed LV systolic function  Angioedema secondary to ACE inhibitor and ARB  AICD, normal function  Chronic systolic heart failure, well compensated no fluid overload  Hypercholesterolemia on simvastatin  Hypertension, good control  Obesity    Plan:  Reassess LV function with echo after discontinuation of ACE inhibitor/ARB  Continue current medications without changes.  If he were to lose more weight we may need to lower doses of metoprolol or hydrochlorothiazide  Follow-up in 6 months       History of Present Illness    Mr. Chaim Dee is a 78 y.o. male who comes in to establish cardiology care at [facility].  He has a history of anterior myocardial infarction in 1999.  He was treated at [facility] in [place].  He had stenting of LAD and later on circumflex.  He has not had any additional myocardial injuries or interventions since that time.  He has been followed at [facility] here in [facility].  He recently moved to live with [facility] residents in [place].  He needs to switch cardiac follow-up office.  Today he has no complaints of chest pain or shortness of breath.  He feels mildly lightheaded after he stands up.  He has not had syncope or heart racing.  3 years ago he received AICD.  He has never had any  AICD treatments.    AICD interrogation 0.2% ventricular paced, 4 runs of nonsustained ventricular tachycardia no treatments     Physical Examination Review of Systems   Vitals:    05/01/18 0956   BP: 110/60   Pulse: 68   Resp: 18     Body mass index is 38.92 kg/(m^2).  Wt Readings from Last 3 Encounters:   05/01/18 (!) 287 lb (130.2 kg)   04/18/18 (!) 289 lb (131.1 kg)   03/12/18 (!) 306 lb (138.8 kg)     General Appearance:   Alert, cooperative, no distress, appears stated age   Head/ENT: Normocephalic, without obvious abnormality. Membranes moist      EYES:  no scleral icterus, normal conjunctivae   Neck: Supple, symmetrical, trachea midline, no adenopathy, thyroid: not enlarged, symmetric, no carotid bruit or JVD   Chest/Lungs:   Lungs are clear to auscultation, respirations unlabored. No tenderness or deformity    Cardiovascular:   Regular rhythm, S1, S2 normal, no murmur, rub or gallop.   Abdomen:  Soft, non-tender, bowel sounds active all four quadrants,  no masses, no organomegaly   Extremities: no cyanosis or clubbing. No edema   Skin: Skin color, texture, turgor normal, no rashes or lesions.    Psychiatric: Normal affect, calm   Neurologic: Alert and oriented x 3, moving all four extremities.     General: WNL  Eyes: WNL  Ears/Nose/Throat: WNL  Lungs: Snoring  Heart: WNL  Stomach: WNL  Bladder: WNL  Muscle/Joints: WNL  Skin: WNL  Nervous System: Loss of Balance  Mental Health: WNL     Blood: WNL     Medical History  Surgical History Family History Social History   Past Medical History:   Diagnosis Date   ? Arthritis unknown    right knee   ? Coronary artery disease     had stents placed 08/08/1999    Past Surgical History:   Procedure Laterality Date   ? CARDIAC DEFIBRILLATOR PLACEMENT  2015   ? CORONARY STENT PLACEMENT  08/08/1999    LAD and Circ   ? ORIF TIBIA & FIBULA FRACTURES Right 1945   ? TONSILLECTOMY  1944    no family history of premature coronary artery disease Social History     Social History    ? Marital status: Single     Spouse name: N/A   ? Number of children: N/A   ? Years of education: N/A     Occupational History   ?  [employer], retired     Social History Main Topics   ? Smoking status: Former Smoker   ? Smokeless tobacco: Never Used   ? Alcohol use 0.6 oz/week     1 Glasses of wine per week      Comment: rare - only in [place]   ? Drug use: No   ? Sexual activity: No     Other Topics Concern   ? Not on file     Social History Narrative    Rod Villanueva, [employer].  Graduate [employer]  Was  for [employer].  From [facility] [facility].  He is into [employer].            Medications  Allergies   Current Outpatient Prescriptions   Medication Sig Dispense Refill   ? aspirin 81 MG EC tablet Take 81 mg by mouth at bedtime.      ? famotidine (PEPCID) 20 MG tablet Take 20 mg by mouth 2 (two) times a day.     ? hydroCHLOROthiazide (HYDRODIURIL) 25 MG tablet Take 25 mg by mouth daily.     ? metoprolol succinate (TOPROL-XL) 50 MG 24 hr tablet Take 150 mg by mouth daily.      ? nitroglycerin (NITROSTAT) 0.4 MG SL tablet Place 0.4 mg under the tongue as needed. Use as directed.     ? simvastatin (ZOCOR) 40 MG tablet Take 40 mg by mouth at bedtime.     ? triamcinolone (KENALOG) 0.1 % cream Apply topically 2 (two) times a day as needed.       No current facility-administered medications for this visit.       Allergies   Allergen Reactions   ? Ace Inhibitors Angiodema   ? Arb-Angiotensin Receptor Antagonist Angiodema   ? Gabapentin Angiodema   ? Losartan Angiodema   ? Ramipril Angiodema   ? Seafood    ? Shellfish Containing Products Nausea And Vomiting     SHRIMP- worst reaction to.   ? Shrimp          Lab Results    Chemistry/lipid CBC Cardiac Enzymes/BNP/TSH/INR   Lab Results   Component Value Date    CHOL 115 04/18/2018    HDL 34 (L) 04/18/2018    LDLCALC 63 04/18/2018    TRIG 92 04/18/2018    CREATININE 0.80 04/18/2018    BUN 18 04/18/2018    K 4.0 04/18/2018     04/18/2018      04/18/2018    CO2 23 04/18/2018    Lab Results   Component Value Date    WBC 9.9 03/01/2018    HGB 17.4 03/01/2018    HCT 50.2 03/01/2018    MCV 94 03/01/2018     03/01/2018    No results found for: CKTOTAL, CKMB, CKMBINDEX, TROPONINI, BNP, TSH, INR

## 2021-06-27 NOTE — PROGRESS NOTES
"Progress Notes by Corrie Verduzco AuD at 2/13/2019  8:30 AM     Author: Corrie Verduzco AuD Service: -- Author Type: Audiologist    Filed: 2/13/2019  9:39 AM Encounter Date: 2/13/2019 Status: Signed    : Corrie Verduzco AuD (Audiologist)       Audiology Report    Referring Provider:   Service     Summary: Audiology visit completed. Please see audiogram below or in \"media\" tab for case history and results.      Transducer: Insert earphones and Circumaural headphones    Reliability was good  and there was good  SRT to PTA agreement.       Plan:  The patient is returned to ENT for follow up.  He should return for retesting with ENT recommendation.  The patient is a candidate for hearing aids, and should consider pending medical clearance. Chaim is a possible candidate for a cochlear implant.    Silvia Mckeon, [facility]  [place] Licensed Audiologist #[id number]                  "

## 2021-06-29 NOTE — PROGRESS NOTES
"Progress Notes by Eddie Cevallos MD (Ted) at 11/13/2020  1:10 PM     Author: Eddie Cevallos MD (Ted) Service: -- Author Type: Physician    Filed: 11/13/2020  1:17 PM Encounter Date: 11/13/2020 Status: Signed    : Eddie Cevallos MD (Ted) (Physician)           Cardiology Progress Note    Assessment:  Coronary artery disease with remote history of anterior MI and stenting of LAD and circumflex in 1999, no angina  Ischemic cardiomyopathy with mildly depressed LV systolic function well compensated, no fluid overload  Recurrent angioedema with tongue swelling, well controlled with antihistamines  AICD, normal function  Chronic systolic heart failure, stable  Hypercholesterolemia on simvastatin, good control  Hypertension, good control  Obesity      Plan:  He appears to be well compensated from cardiac standpoint.  We will continue current cardiac medications without changes    Follow-up in 1 year    Subjective:   This is 80 y.o. male who comes in today for follow-up visit.  He reports no chest pain or increasing shortness of breath.  He tries to walk every day.  He denies PND, orthopnea.  He has not had heart palpitations or AICD firing.    Review of Systems:   General: WNL  Eyes: WNL  Ears/Nose/Throat: WNL  Lungs: WNL  Heart: WNL  Stomach: WNL  Bladder: WNL  Muscle/Joints: WNL  Skin: WNL  Nervous System: WNL  Mental Health: WNL     Blood: WNL    Objective:   /62 (Patient Site: Left Arm, Patient Position: Sitting, Cuff Size: Adult Large)   Pulse 88   Resp 16   Ht 6' 0.01\" (1.829 m)   Wt (!) 278 lb 8 oz (126.3 kg)   BMI 37.76 kg/m    Physical Exam:  GENERAL: no distress  NECK: No JVD  LUNGS: Clear to auscultation.  CARDIAC: regular rhythm, S1 & S2 normal.  No heaves, thrills, gallops or murmurs.  ABDOMEN: flat, negative hepatosplenomegaly, soft and non-tender.  EXTREMITIES: No evidence of cyanosis, clubbing or edema.    Current Outpatient Medications   Medication Sig " Dispense Refill   ? acetaminophen (TYLENOL) 325 MG tablet Take 2 tablets (650 mg total) by mouth every 4 (four) hours as needed.  0   ? clopidogrel (PLAVIX) 75 mg tablet Take 1 tablet (75 mg total) by mouth daily. 30 tablet 12   ? diphenhydrAMINE (BENADRYL) 25 mg tablet Take 25-50 mg by mouth every 6 (six) hours as needed.            ? famotidine (PEPCID) 20 MG tablet TAKE 1 TABLET TWICE A  tablet 3   ? fexofenadine (ALLEGRA) 180 MG tablet Take 1 tablet (180 mg total) by mouth daily. 90 tablet 3   ? hydroCHLOROthiazide (HYDRODIURIL) 25 MG tablet TAKE 1 TABLET DAILY (DUE FOR CLINIC VISIT PRIOR TO FURTHER REFILLS) 90 tablet 3   ? metoprolol succinate (TOPROL-XL) 100 MG 24 hr tablet Take 1 tablet (100 mg total) by mouth daily. 90 tablet 3   ? nitroglycerin (NITROSTAT) 0.4 MG SL tablet Place 1 tablet (0.4 mg total) under the tongue as needed. Use as directed. 30 tablet 0   ? simvastatin (ZOCOR) 40 MG tablet TAKE 1 TABLET AT BEDTIME 90 tablet 3     No current facility-administered medications for this visit.        Cardiographics:    Device interrogation: Normal function, no arrhythmias     Echo: May 2018    Normal right ventricular size and systolic function.    Left ventricle ejection fraction is mildly decreased. The estimated left ventricular ejection fraction is 50%.    Distal septal and apical hypokinesis    No hemodynamically significant valvular heart abnormalities.       Lab Results:       Lab Results   Component Value Date    CHOL 120 06/23/2020    CHOL 116 11/06/2019    CHOL 125 01/17/2019     Lab Results   Component Value Date    HDL 40 06/23/2020    HDL 35 (L) 11/06/2019    HDL 38 (L) 01/17/2019     Lab Results   Component Value Date    LDLCALC 67 06/23/2020    LDLCALC 62 11/06/2019    LDLCALC 67 01/17/2019     Lab Results   Component Value Date    TRIG 66 06/23/2020    TRIG 95 11/06/2019    TRIG 100 01/17/2019     No results found for: BNP    Eddie (Bucky)  MD [place]

## 2021-08-12 DIAGNOSIS — I25.10 CHRONIC CORONARY ARTERY DISEASE: ICD-10-CM

## 2021-08-12 DIAGNOSIS — I10 ESSENTIAL HYPERTENSION: ICD-10-CM

## 2021-08-12 RX ORDER — CLOPIDOGREL BISULFATE 75 MG/1
75 TABLET ORAL DAILY
Qty: 90 TABLET | Refills: 1 | Status: SHIPPED | OUTPATIENT
Start: 2021-08-12 | End: 2022-01-04

## 2021-08-16 RX ORDER — HYDROCHLOROTHIAZIDE 25 MG/1
TABLET ORAL
Qty: 90 TABLET | Refills: 3 | Status: SHIPPED | OUTPATIENT
Start: 2021-08-16 | End: 2021-10-04

## 2021-08-16 NOTE — TELEPHONE ENCOUNTER
"Last Written Prescription Date:  12/11/20  Last Fill Quantity: 90,  # refills: 1   Last office visit provider:  7/1/21     Requested Prescriptions   Pending Prescriptions Disp Refills     hydrochlorothiazide (HYDRODIURIL) 25 MG tablet [Pharmacy Med Name: HYDROCHLOROTHIAZIDE TABS 25MG] 90 tablet 3     Sig: TAKE 1 TABLET DAILY (DUE FOR CLINIC VISIT PRIOR TO FURTHER REFILLS)       Diuretics (Including Combos) Protocol Passed - 8/12/2021  7:55 AM        Passed - Blood pressure under 140/90 in past 12 months     BP Readings from Last 3 Encounters:   07/01/21 130/72   04/15/21 138/62   04/05/21 116/76                 Passed - Recent (12 mo) or future (30 days) visit within the authorizing provider's specialty     Patient has had an office visit with the authorizing provider or a provider within the authorizing providers department within the previous 12 mos or has a future within next 30 days. See \"Patient Info\" tab in inbasket, or \"Choose Columns\" in Meds & Orders section of the refill encounter.              Passed - Medication is active on med list        Passed - Patient is age 18 or older        Passed - Normal serum creatinine on file in past 12 months     Recent Labs   Lab Test 04/05/21  0840   CR 0.96              Passed - Normal serum potassium on file in past 12 months     Recent Labs   Lab Test 04/05/21  0840   POTASSIUM 4.2                    Passed - Normal serum sodium on file in past 12 months     Recent Labs   Lab Test 04/05/21  0840                      Charley Jaquez RN 08/16/21 9:37 AM  "

## 2021-10-04 ENCOUNTER — OFFICE VISIT (OUTPATIENT)
Dept: INTERNAL MEDICINE | Facility: CLINIC | Age: 81
End: 2021-10-04
Payer: MEDICARE

## 2021-10-04 VITALS
SYSTOLIC BLOOD PRESSURE: 130 MMHG | OXYGEN SATURATION: 97 % | HEART RATE: 76 BPM | HEIGHT: 70 IN | DIASTOLIC BLOOD PRESSURE: 72 MMHG | WEIGHT: 269.4 LBS | BODY MASS INDEX: 38.57 KG/M2

## 2021-10-04 DIAGNOSIS — E55.9 VITAMIN D DEFICIENCY: ICD-10-CM

## 2021-10-04 DIAGNOSIS — I10 ESSENTIAL HYPERTENSION: ICD-10-CM

## 2021-10-04 DIAGNOSIS — I50.22 CHRONIC SYSTOLIC HEART FAILURE (H): Primary | ICD-10-CM

## 2021-10-04 DIAGNOSIS — I25.10 CHRONIC CORONARY ARTERY DISEASE: ICD-10-CM

## 2021-10-04 DIAGNOSIS — K21.9 GASTROESOPHAGEAL REFLUX DISEASE WITHOUT ESOPHAGITIS: ICD-10-CM

## 2021-10-04 DIAGNOSIS — E78.2 MIXED HYPERLIPIDEMIA: ICD-10-CM

## 2021-10-04 DIAGNOSIS — T78.3XXD ANGIOEDEMA, SUBSEQUENT ENCOUNTER: ICD-10-CM

## 2021-10-04 DIAGNOSIS — D69.6 THROMBOCYTOPENIA (H): ICD-10-CM

## 2021-10-04 DIAGNOSIS — L50.9 URTICARIA: ICD-10-CM

## 2021-10-04 LAB
ANION GAP SERPL CALCULATED.3IONS-SCNC: 12 MMOL/L (ref 5–18)
BUN SERPL-MCNC: 15 MG/DL (ref 8–28)
CALCIUM SERPL-MCNC: 9.3 MG/DL (ref 8.5–10.5)
CHLORIDE BLD-SCNC: 103 MMOL/L (ref 98–107)
CHOLEST SERPL-MCNC: 121 MG/DL
CO2 SERPL-SCNC: 23 MMOL/L (ref 22–31)
CREAT SERPL-MCNC: 0.82 MG/DL (ref 0.7–1.3)
ERYTHROCYTE [DISTWIDTH] IN BLOOD BY AUTOMATED COUNT: 13.9 % (ref 10–15)
FASTING STATUS PATIENT QL REPORTED: YES
GFR SERPL CREATININE-BSD FRML MDRD: 82 ML/MIN/1.73M2
GLUCOSE BLD-MCNC: 109 MG/DL (ref 70–125)
HCT VFR BLD AUTO: 47.3 % (ref 40–53)
HDLC SERPL-MCNC: 37 MG/DL
HGB BLD-MCNC: 16.1 G/DL (ref 13.3–17.7)
LDLC SERPL CALC-MCNC: 64 MG/DL
MCH RBC QN AUTO: 31.3 PG (ref 26.5–33)
MCHC RBC AUTO-ENTMCNC: 34 G/DL (ref 31.5–36.5)
MCV RBC AUTO: 92 FL (ref 78–100)
PLATELET # BLD AUTO: 173 10E3/UL (ref 150–450)
POTASSIUM BLD-SCNC: 3.1 MMOL/L (ref 3.5–5)
RBC # BLD AUTO: 5.14 10E6/UL (ref 4.4–5.9)
SODIUM SERPL-SCNC: 138 MMOL/L (ref 136–145)
TRIGL SERPL-MCNC: 98 MG/DL
WBC # BLD AUTO: 7.6 10E3/UL (ref 4–11)

## 2021-10-04 PROCEDURE — 36415 COLL VENOUS BLD VENIPUNCTURE: CPT | Performed by: INTERNAL MEDICINE

## 2021-10-04 PROCEDURE — 80048 BASIC METABOLIC PNL TOTAL CA: CPT | Performed by: INTERNAL MEDICINE

## 2021-10-04 PROCEDURE — 99214 OFFICE O/P EST MOD 30 MIN: CPT | Mod: 25 | Performed by: INTERNAL MEDICINE

## 2021-10-04 PROCEDURE — 85027 COMPLETE CBC AUTOMATED: CPT | Performed by: INTERNAL MEDICINE

## 2021-10-04 PROCEDURE — 80061 LIPID PANEL: CPT | Performed by: INTERNAL MEDICINE

## 2021-10-04 PROCEDURE — 90662 IIV NO PRSV INCREASED AG IM: CPT | Performed by: INTERNAL MEDICINE

## 2021-10-04 PROCEDURE — G0008 ADMIN INFLUENZA VIRUS VAC: HCPCS | Performed by: INTERNAL MEDICINE

## 2021-10-04 RX ORDER — HYDROCHLOROTHIAZIDE 25 MG/1
25 TABLET ORAL DAILY
Qty: 90 TABLET | Refills: 3
Start: 2021-10-04 | End: 2022-05-03

## 2021-10-04 RX ORDER — PREDNISONE 20 MG/1
40 TABLET ORAL DAILY PRN
Qty: 6 TABLET | Refills: 11
Start: 2021-10-04 | End: 2021-12-23

## 2021-10-04 RX ORDER — SIMVASTATIN 40 MG
40 TABLET ORAL AT BEDTIME
Qty: 90 TABLET | Refills: 3
Start: 2021-10-04 | End: 2022-05-03

## 2021-10-04 RX ORDER — CHOLECALCIFEROL (VITAMIN D3) 50 MCG
1 TABLET ORAL DAILY
Start: 2021-10-04

## 2021-10-04 RX ORDER — FAMOTIDINE 20 MG/1
20 TABLET, FILM COATED ORAL AT BEDTIME
Qty: 180 TABLET | Refills: 3
Start: 2021-10-04

## 2021-10-04 RX ORDER — FEXOFENADINE HCL 180 MG/1
180 TABLET ORAL DAILY
Qty: 90 TABLET | Refills: 3
Start: 2021-10-04

## 2021-10-04 RX ORDER — METOPROLOL SUCCINATE 100 MG/1
100 TABLET, EXTENDED RELEASE ORAL DAILY
Qty: 90 TABLET | Refills: 3
Start: 2021-10-04 | End: 2022-05-03

## 2021-10-04 ASSESSMENT — MIFFLIN-ST. JEOR: SCORE: 1923.24
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
## 2021-11-04 ENCOUNTER — IMMUNIZATION (OUTPATIENT)
Dept: NURSING | Facility: CLINIC | Age: 81
End: 2021-11-04
Payer: MEDICARE

## 2021-11-04 PROCEDURE — 0004A PR COVID VAC PFIZER DIL RECON 30 MCG/0.3 ML IM: CPT

## 2021-11-04 PROCEDURE — 91300 PR COVID VAC PFIZER DIL RECON 30 MCG/0.3 ML IM: CPT

## 2021-12-21 ENCOUNTER — ANCILLARY PROCEDURE (OUTPATIENT)
Dept: CARDIOLOGY | Facility: CLINIC | Age: 81
End: 2021-12-21
Attending: INTERNAL MEDICINE
Payer: MEDICARE

## 2021-12-21 DIAGNOSIS — Z95.810 ICD (IMPLANTABLE CARDIOVERTER-DEFIBRILLATOR) IN PLACE: ICD-10-CM

## 2021-12-21 DIAGNOSIS — I25.5 ISCHEMIC CARDIOMYOPATHY: ICD-10-CM

## 2021-12-21 PROCEDURE — 93296 REM INTERROG EVL PM/IDS: CPT | Performed by: INTERNAL MEDICINE

## 2021-12-21 PROCEDURE — 93295 DEV INTERROG REMOTE 1/2/MLT: CPT | Performed by: INTERNAL MEDICINE

## 2021-12-22 ENCOUNTER — TRANSCRIBE ORDERS (OUTPATIENT)
Dept: CARDIOLOGY | Facility: CLINIC | Age: 81
End: 2021-12-22
Payer: MEDICARE

## 2021-12-22 ENCOUNTER — TELEPHONE (OUTPATIENT)
Dept: CARDIOLOGY | Facility: CLINIC | Age: 81
End: 2021-12-22
Payer: MEDICARE

## 2021-12-22 DIAGNOSIS — Z95.810 ICD (IMPLANTABLE CARDIOVERTER-DEFIBRILLATOR) IN PLACE: Primary | ICD-10-CM

## 2021-12-22 NOTE — TELEPHONE ENCOUNTER
Jose Deng MD Gebel, Mandy L RN  Caller: Unspecified (Today,  9:44 AM)  Agree, VF with appropriate shock.  I will arrange to see him in device clinic tomorrow at [place].  dd             Previous Messages       ----- Message -----   From: Chanell Gonzalez RN   Sent: 12/22/2021  10:40 AM CST   To: Jose Deng MD     ----- Message from Chanell Gonzalez RN sent at 12/22/2021 10:40 AM CST -----   Currently at [facility] patient, not followed by EP -please review for ICD shock yesterday. Thanks!   ------------------------------  Above noted. Will forward to scheduling as well.   Chanell Gonzalez, AYLA

## 2021-12-22 NOTE — TELEPHONE ENCOUNTER
----- Message from Sara Freitas sent at 12/22/2021  8:37 AM CST -----  Regarding: device RN review  Type: alert remote ICD transmission for shock delivered.   Presenting rhythm: ventricular sensing, appears sinus 65 bpm.  Battery/lead status: stable.  Arrhythmias: since 11/11/20; one VF episode on 12/21/21 11:51am. duration 19 seconds, terminated with 35J shock with immediate conversion. 5 other NSVT detected.  Comments: normal ICD function. Routed to device RN for review.   Device/lead alerts: none. EMalinda Freitas, Device Specialist    Looks like patient is overdue to come into the clinic.

## 2021-12-22 NOTE — TELEPHONE ENCOUNTER
"Sara Freitas [facility] Device [facility]  Type: alert remote ICD transmission for shock delivered.   Presenting rhythm: ventricular sensing, appears sinus 65 bpm.   Battery/lead status: stable.   Arrhythmias: since 11/11/20; one VF episode on 12/21/21 11:51am. duration 19 seconds, terminated with 35J shock with immediate conversion. 5 other NSVT detected.   Comments: normal ICD function. Routed to device RN for review.   Device/lead alerts: none. E. Pivec, Device Specialist      Transmission reviewed, agree with above. Episode of rapid VT/VF noted, prior to episode there is a brief surge of tachycardia, then slows to 45bpm (programmed VVI@ 40) then VT/VF episode with rates >250bpm is noted. 35 J shock delivered with successful conversion of rhythm    Call placed to patient to review findings. Patient states he does not recall feeling the shock, but did wonder if something was wrong because he became \" very lightheaded at around noon yesterday and fell to his knees. My right knee is still pretty sore today, I have had issues with that knee in past as well.\"     States prior to shock had just eaten lunch and was going up to his room. Denies any chest pain, shortness of breath, lightheadedness today. Does report feeling \"just really tired overall.\"    Advised when to seek ER if starts to have any of the previously mentioned symptoms, and/or more shocks from device. Advised not to drive until reviewed by provider. Patient verbalized understanding. Confirms Toprol  mg daily and has not missed any doses recently.     Device implanted in 2018 by Dr. Hodgkin at [facility] ; Dx: Ischemic Cardiomyopathy, Primary Prevention. Patient not currently followed by EP MD, is patient of Dr. Cevallos.              Will review with Dr. Deng for further recommendations. Full device report scanned in.     Chanell Gonzalez RN    "

## 2021-12-23 ENCOUNTER — OFFICE VISIT (OUTPATIENT)
Dept: CARDIOLOGY | Facility: CLINIC | Age: 81
End: 2021-12-23
Attending: INTERNAL MEDICINE
Payer: MEDICARE

## 2021-12-23 VITALS
BODY MASS INDEX: 39.03 KG/M2 | WEIGHT: 272 LBS | DIASTOLIC BLOOD PRESSURE: 70 MMHG | RESPIRATION RATE: 16 BRPM | SYSTOLIC BLOOD PRESSURE: 120 MMHG | HEART RATE: 83 BPM

## 2021-12-23 DIAGNOSIS — I47.29 PAROXYSMAL VENTRICULAR TACHYCARDIA (H): Primary | ICD-10-CM

## 2021-12-23 DIAGNOSIS — I25.5 ISCHEMIC CARDIOMYOPATHY: ICD-10-CM

## 2021-12-23 DIAGNOSIS — Z95.810 ICD (IMPLANTABLE CARDIOVERTER-DEFIBRILLATOR), SINGLE, IN SITU: ICD-10-CM

## 2021-12-23 DIAGNOSIS — Z95.810 ICD (IMPLANTABLE CARDIOVERTER-DEFIBRILLATOR) IN PLACE: ICD-10-CM

## 2021-12-23 DIAGNOSIS — I10 HYPERTENSION, UNSPECIFIED TYPE: ICD-10-CM

## 2021-12-23 PROBLEM — I47.20 PAROXYSMAL VENTRICULAR TACHYCARDIA (H): Status: ACTIVE | Noted: 2021-12-23

## 2021-12-23 LAB
ANION GAP SERPL CALCULATED.3IONS-SCNC: 13 MMOL/L (ref 5–18)
BUN SERPL-MCNC: 24 MG/DL (ref 8–28)
CALCIUM SERPL-MCNC: 9.5 MG/DL (ref 8.5–10.5)
CHLORIDE BLD-SCNC: 104 MMOL/L (ref 98–107)
CO2 SERPL-SCNC: 22 MMOL/L (ref 22–31)
CREAT SERPL-MCNC: 1.01 MG/DL (ref 0.7–1.3)
GFR SERPL CREATININE-BSD FRML MDRD: 74 ML/MIN/1.73M2
GLUCOSE BLD-MCNC: 108 MG/DL (ref 70–125)
MAGNESIUM SERPL-MCNC: 2 MG/DL (ref 1.8–2.6)
MDC_IDC_EPISODE_DTM: NORMAL
MDC_IDC_EPISODE_DURATION: 1 S
MDC_IDC_EPISODE_DURATION: 19 S
MDC_IDC_EPISODE_DURATION: 2 S
MDC_IDC_EPISODE_DURATION: 3 S
MDC_IDC_EPISODE_ID: 26
MDC_IDC_EPISODE_ID: 27
MDC_IDC_EPISODE_ID: 28
MDC_IDC_EPISODE_ID: 29
MDC_IDC_EPISODE_ID: 30
MDC_IDC_EPISODE_ID: 31
MDC_IDC_EPISODE_TYPE: NORMAL
MDC_IDC_LEAD_IMPLANT_DT: NORMAL
MDC_IDC_LEAD_LOCATION: NORMAL
MDC_IDC_LEAD_LOCATION_DETAIL_1: NORMAL
MDC_IDC_LEAD_MFG: NORMAL
MDC_IDC_LEAD_MODEL: NORMAL
MDC_IDC_LEAD_POLARITY_TYPE: NORMAL
MDC_IDC_LEAD_SERIAL: NORMAL
MDC_IDC_LEAD_SPECIAL_FUNCTION: NORMAL
MDC_IDC_MSMT_BATTERY_DTM: NORMAL
MDC_IDC_MSMT_BATTERY_REMAINING_LONGEVITY: 66 MO
MDC_IDC_MSMT_BATTERY_RRT_TRIGGER: 2.73
MDC_IDC_MSMT_BATTERY_STATUS: NORMAL
MDC_IDC_MSMT_BATTERY_VOLTAGE: 2.87 V
MDC_IDC_MSMT_CAP_CHARGE_DTM: NORMAL
MDC_IDC_MSMT_CAP_CHARGE_DTM: NORMAL
MDC_IDC_MSMT_CAP_CHARGE_ENERGY: 18 J
MDC_IDC_MSMT_CAP_CHARGE_ENERGY: 35 J
MDC_IDC_MSMT_CAP_CHARGE_TIME: 3.9
MDC_IDC_MSMT_CAP_CHARGE_TIME: 9.62
MDC_IDC_MSMT_CAP_CHARGE_TYPE: NORMAL
MDC_IDC_MSMT_CAP_CHARGE_TYPE: NORMAL
MDC_IDC_MSMT_LEADCHNL_RV_IMPEDANCE_VALUE: 304 OHM
MDC_IDC_MSMT_LEADCHNL_RV_IMPEDANCE_VALUE: 342 OHM
MDC_IDC_MSMT_LEADCHNL_RV_PACING_THRESHOLD_AMPLITUDE: 0.62 V
MDC_IDC_MSMT_LEADCHNL_RV_PACING_THRESHOLD_PULSEWIDTH: 0.4 MS
MDC_IDC_MSMT_LEADCHNL_RV_SENSING_INTR_AMPL: 4.12 MV
MDC_IDC_MSMT_LEADCHNL_RV_SENSING_INTR_AMPL: 4.12 MV
MDC_IDC_PG_IMPLANT_DTM: NORMAL
MDC_IDC_PG_MFG: NORMAL
MDC_IDC_PG_MODEL: NORMAL
MDC_IDC_PG_SERIAL: NORMAL
MDC_IDC_PG_TYPE: NORMAL
MDC_IDC_SESS_CLINIC_NAME: NORMAL
MDC_IDC_SESS_DTM: NORMAL
MDC_IDC_SESS_TYPE: NORMAL
MDC_IDC_SET_BRADY_HYSTRATE: NORMAL
MDC_IDC_SET_BRADY_LOWRATE: 40 {BEATS}/MIN
MDC_IDC_SET_BRADY_MODE: NORMAL
MDC_IDC_SET_LEADCHNL_RV_PACING_AMPLITUDE: 1.5 V
MDC_IDC_SET_LEADCHNL_RV_PACING_ANODE_ELECTRODE_1: NORMAL
MDC_IDC_SET_LEADCHNL_RV_PACING_ANODE_LOCATION_1: NORMAL
MDC_IDC_SET_LEADCHNL_RV_PACING_CAPTURE_MODE: NORMAL
MDC_IDC_SET_LEADCHNL_RV_PACING_CATHODE_ELECTRODE_1: NORMAL
MDC_IDC_SET_LEADCHNL_RV_PACING_CATHODE_LOCATION_1: NORMAL
MDC_IDC_SET_LEADCHNL_RV_PACING_POLARITY: NORMAL
MDC_IDC_SET_LEADCHNL_RV_PACING_PULSEWIDTH: 0.4 MS
MDC_IDC_SET_LEADCHNL_RV_SENSING_ANODE_ELECTRODE_1: NORMAL
MDC_IDC_SET_LEADCHNL_RV_SENSING_ANODE_LOCATION_1: NORMAL
MDC_IDC_SET_LEADCHNL_RV_SENSING_CATHODE_ELECTRODE_1: NORMAL
MDC_IDC_SET_LEADCHNL_RV_SENSING_CATHODE_LOCATION_1: NORMAL
MDC_IDC_SET_LEADCHNL_RV_SENSING_POLARITY: NORMAL
MDC_IDC_SET_LEADCHNL_RV_SENSING_SENSITIVITY: 0.3 MV
MDC_IDC_SET_ZONE_DETECTION_BEATS_DENOMINATOR: 32 {BEATS}
MDC_IDC_SET_ZONE_DETECTION_BEATS_NUMERATOR: 24 {BEATS}
MDC_IDC_SET_ZONE_DETECTION_INTERVAL: 240 MS
MDC_IDC_SET_ZONE_DETECTION_INTERVAL: 320 MS
MDC_IDC_SET_ZONE_DETECTION_INTERVAL: 360 MS
MDC_IDC_SET_ZONE_DETECTION_INTERVAL: 400 MS
MDC_IDC_SET_ZONE_TYPE: NORMAL
MDC_IDC_STAT_BRADY_DTM_END: NORMAL
MDC_IDC_STAT_BRADY_DTM_START: NORMAL
MDC_IDC_STAT_BRADY_RV_PERCENT_PACED: 0.94 %
MDC_IDC_STAT_EPISODE_RECENT_COUNT: 0
MDC_IDC_STAT_EPISODE_RECENT_COUNT: 1
MDC_IDC_STAT_EPISODE_RECENT_COUNT: 5
MDC_IDC_STAT_EPISODE_RECENT_COUNT_DTM_END: NORMAL
MDC_IDC_STAT_EPISODE_RECENT_COUNT_DTM_START: NORMAL
MDC_IDC_STAT_EPISODE_TOTAL_COUNT: 0
MDC_IDC_STAT_EPISODE_TOTAL_COUNT: 1
MDC_IDC_STAT_EPISODE_TOTAL_COUNT: 2
MDC_IDC_STAT_EPISODE_TOTAL_COUNT: 26
MDC_IDC_STAT_EPISODE_TOTAL_COUNT_DTM_END: NORMAL
MDC_IDC_STAT_EPISODE_TOTAL_COUNT_DTM_START: NORMAL
MDC_IDC_STAT_EPISODE_TYPE: NORMAL
MDC_IDC_STAT_TACHYTHERAPY_ATP_DELIVERED_RECENT: 0
MDC_IDC_STAT_TACHYTHERAPY_ATP_DELIVERED_TOTAL: 0
MDC_IDC_STAT_TACHYTHERAPY_RECENT_DTM_END: NORMAL
MDC_IDC_STAT_TACHYTHERAPY_RECENT_DTM_START: NORMAL
MDC_IDC_STAT_TACHYTHERAPY_SHOCKS_ABORTED_RECENT: 0
MDC_IDC_STAT_TACHYTHERAPY_SHOCKS_ABORTED_TOTAL: 0
MDC_IDC_STAT_TACHYTHERAPY_SHOCKS_DELIVERED_RECENT: 1
MDC_IDC_STAT_TACHYTHERAPY_SHOCKS_DELIVERED_TOTAL: 1
MDC_IDC_STAT_TACHYTHERAPY_TOTAL_DTM_END: NORMAL
MDC_IDC_STAT_TACHYTHERAPY_TOTAL_DTM_START: NORMAL
POTASSIUM BLD-SCNC: 3.7 MMOL/L (ref 3.5–5)
SODIUM SERPL-SCNC: 139 MMOL/L (ref 136–145)

## 2021-12-23 PROCEDURE — 83735 ASSAY OF MAGNESIUM: CPT | Performed by: INTERNAL MEDICINE

## 2021-12-23 PROCEDURE — 99203 OFFICE O/P NEW LOW 30 MIN: CPT | Performed by: INTERNAL MEDICINE

## 2021-12-23 PROCEDURE — 36415 COLL VENOUS BLD VENIPUNCTURE: CPT | Performed by: INTERNAL MEDICINE

## 2021-12-23 PROCEDURE — 93282 PRGRMG EVAL IMPLANTABLE DFB: CPT | Performed by: INTERNAL MEDICINE

## 2021-12-23 PROCEDURE — 80048 BASIC METABOLIC PNL TOTAL CA: CPT | Performed by: INTERNAL MEDICINE

## 2021-12-23 NOTE — LETTER
12/23/2021    Luis Elena MD  [address] [id number]    RE: Chaim Dee       Dear Colleague,     I had the pleasure of seeing Chaim Dee in the [facility].           ELECTROPHYSIOLOGY NOTE    Today I had the opportunity to see  Chaim Dee for follow-up evaluation of syncope with an ICD shock.      Assessment/Recommendations   Clinic Problem List:  (I47.2) Paroxysmal ventricular tachycardia (H)  (primary encounter diagnosis)  Comment: Very rapid VT VF with syncope and appropriate ICD shock   plan: NM Lexiscan stress test (nuc card),         Echocardiogram Complete, Basic metabolic panel,        Magnesium        Driving restriction    (I25.5) Ischemic cardiomyopathy  Comment: No signs of heart failure  Plan: Echocardiogram Complete           (Z95.810) ICD (implantable cardioverter-defibrillator), single, in situ  Comment: Normal function    (I10) Hypertension, unspecified type  Comment: Controlled    45 minutes spent on this encounter date for chart review, history, physical exam,documentation and activities detailed below.    Plan:  I have ordered a BMP and magnesium blood test  Lexiscan stress test and cardiac echo were ordered  Follow up appointment:   With Dr. Elena as scheduled  You should not drive for 6 months  Call if recurrent fainting or shocks from your defibrillator       History of Present Illness     Chaim Dee is a 81 year old male with a long history of ischemic cardiomyopathy. He had an anterior myocardial infarction in 1999 and apparently received stents to the left anterior descending artery and circumflex arteries. He received an implantable defibrillator in February 2018 probably for primary prophylaxis. He denies history of cardiac arrest prior syncope or shocks from his defibrillator.  On December 21 he was standing at home when he suddenly felt lightheaded and woke up on the floor. Interrogation of his ICD shows that he  had very rapid ventricular tachycardia cycle length approximately 230 ms with appropriate treatment with a 35 J shock.  Patient denies exertional dyspnea or exertional chest pain. He is slightly limited by degenerative joint disease in his right knee. He denies orthopnea PND or peripheral edema. He has had a diagnosis of obstructive sleep apnea but does not use CPAP. He is a non-smoker.  Personally reviewed. ICD interrogation today shows other brief episodes of nonsustained VT but no VT therapies. RV pacing and sensing thresholds are excellent estimated battery longevity is 5.4 years. He seldom paces.       Physical Examination Review of Systems   /70 (BP Location: Left arm, Patient Position: Sitting, Cuff Size: Adult Large)   Pulse 83   Resp 16   Wt 123.4 kg (272 lb)   BMI 39.03 kg/m    Body mass index is 39.03 kg/m .  Wt Readings from Last 3 Encounters:   12/23/21 123.4 kg (272 lb)   10/04/21 122.2 kg (269 lb 6.4 oz)   07/01/21 129.2 kg (284 lb 12.8 oz)        Appearance:   no distress, obese   HEENT:   no scleral icterus, normal conjunctivae    Neck: no carotid bruits or thyromegaly   Chest/Lungs:   lungs are clear to auscultation, no rales or wheezing, ICD in the right subclavian pocket   Cardiovascular:   Jugular venous pressure less than 5 cm, Apical pulse is regular. Normal S1,S2 with no murmurs or gallops,   Abdomen:  no  Hepatosplenomegaly., nontender,  bowel sounds are present   Extremities: no cyanosis or clubbing, No edema   Skin: no xanthelasma, warm.    Neurologic: No gross focal neurologic deficits   Mood/Affect: Alert, cooperative                                              Medical History  Surgical History Family History Social History   Past Medical History:   Diagnosis Date     Arthritis unknown    right knee     Cholecystitis      Coronary artery disease     had stents placed 08/08/1999     Hypercholesteremia      Hypertension      ICD (implantable cardioverter-defibrillator) in place       Myocardial infarction (H) 1999     Obesity      Scrotal swelling      Sleep apnea     can't tolerate cpap    Past Surgical History:   Procedure Laterality Date     CARDIAC DEFIBRILLATOR PLACEMENT  2015     CORONARY STENT PLACEMENT  08/08/1999    LAD and Circ     FRACTURE SURGERY       LAPAROSCOPIC CHOLECYSTECTOMY N/A 11/9/2018    Procedure: LAPAROSCOPIC CHOLECYSTECTOMY;  Surgeon: Mark Bee MD;  Location: [facility] OR;  Service: General     ORIF TIBIA & FIBULA FRACTURES Right 1945     TONSILLECTOMY  1944    Family History   Problem Relation Age of Onset     Other - See Comments Mother         gallbladder surgery     Cerebrovascular Disease Father      Coronary Artery Disease Brother     Social History     Socioeconomic History     Marital status: Single     Spouse name: Not on file     Number of children: Not on file     Years of education: Not on file     Highest education level: Not on file   Occupational History     Not on file   Tobacco Use     Smoking status: Former Smoker     Smokeless tobacco: Never Used   Substance and Sexual Activity     Alcohol use: Yes     Alcohol/week: 1.0 standard drink     Comment: Alcoholic Drinks/day: rare - only in [place]     Drug use: No     Sexual activity: Never   Other Topics Concern     Not on file   Social History Narrative    Rod Villanueva, [employer].  Graduate [employer]  Was  for [employer].  From [facility].  He is into [employer].       Social Determinants of Health     Financial Resource Strain: Not on file   Food Insecurity: Not on file   Transportation Needs: Not on file   Physical Activity: Not on file   Stress: Not on file   Social Connections: Not on file   Intimate Partner Violence: Not on file   Housing Stability: Not on file          Medications  Allergies   Current Outpatient Medications   Medication Sig Dispense Refill     acetaminophen (TYLENOL) 325 MG tablet [ACETAMINOPHEN (TYLENOL) 325 MG TABLET] Take 2 tablets (650  mg total) by mouth every 4 (four) hours as needed.  0     clopidogrel (PLAVIX) 75 MG tablet Take 1 tablet (75 mg) by mouth daily 90 tablet 1     diphenhydrAMINE (BENADRYL) 25 MG tablet Take 1-2 tablets (25-50 mg) by mouth nightly as needed (angioedema)       famotidine (PEPCID) 20 MG tablet Take 1 tablet (20 mg) by mouth At Bedtime 180 tablet 3     fexofenadine (ALLEGRA) 180 MG tablet Take 1 tablet (180 mg) by mouth daily 90 tablet 3     hydrochlorothiazide (HYDRODIURIL) 25 MG tablet Take 1 tablet (25 mg) by mouth daily 90 tablet 3     metoprolol succinate ER (TOPROL-XL) 100 MG 24 hr tablet Take 1 tablet (100 mg) by mouth daily 90 tablet 3     nitroglycerin (NITROSTAT) 0.4 MG SL tablet [NITROGLYCERIN (NITROSTAT) 0.4 MG SL TABLET] Place 1 tablet (0.4 mg total) under the tongue as needed. Use as directed. 30 tablet 0     simvastatin (ZOCOR) 40 MG tablet Take 1 tablet (40 mg) by mouth At Bedtime 90 tablet 3     vitamin D3 (CHOLECALCIFEROL) 50 mcg (2000 units) tablet Take 1 tablet (50 mcg) by mouth daily       predniSONE (DELTASONE) 20 MG tablet Take 2 tablets (40 mg) by mouth daily as needed (Patient not taking: Reported on 12/23/2021) 6 tablet 11      Allergies   Allergen Reactions     Ace Inhibitors Angioedema     Arb-Angiotensin Receptor Antagonist [Angiotensin Receptor Blockers] Angioedema     Aspirin Unknown     Angioedema - stopped aspirin, angioedema resolved, restarted recurred (fairly strong evidence)     Gabapentin Angioedema     Losartan Angioedema     Ramipril Angioedema     Seafood Nausea and Vomiting     Shellfish Containing Products [Shellfish-Derived Products] Nausea and Vomiting     SHRIMP- worst reaction to.

## 2021-12-23 NOTE — PROGRESS NOTES
ELECTROPHYSIOLOGY NOTE    Today I had the opportunity to see  Chaim Dee for follow-up evaluation of syncope with an ICD shock.      Assessment/Recommendations   Clinic Problem List:  (I47.2) Paroxysmal ventricular tachycardia (H)  (primary encounter diagnosis)  Comment: Very rapid VT VF with syncope and appropriate ICD shock   plan: NM Lexiscan stress test (nuc card),         Echocardiogram Complete, Basic metabolic panel,        Magnesium        Driving restriction    (I25.5) Ischemic cardiomyopathy  Comment: No signs of heart failure  Plan: Echocardiogram Complete           (Z95.810) ICD (implantable cardioverter-defibrillator), single, in situ  Comment: Normal function    (I10) Hypertension, unspecified type  Comment: Controlled    45 minutes spent on this encounter date for chart review, history, physical exam,documentation and activities detailed below.    Plan:  I have ordered a BMP and magnesium blood test  Lexiscan stress test and cardiac echo were ordered  Follow up appointment:   With Dr. Elena as scheduled  You should not drive for 6 months  Call if recurrent fainting or shocks from your defibrillator       History of Present Illness     Chaim Dee is a 81 year old male with a long history of ischemic cardiomyopathy. He had an anterior myocardial infarction in 1999 and apparently received stents to the left anterior descending artery and circumflex arteries. He received an implantable defibrillator in February 2018 probably for primary prophylaxis. He denies history of cardiac arrest prior syncope or shocks from his defibrillator.  On December 21 he was standing at home when he suddenly felt lightheaded and woke up on the floor. Interrogation of his ICD shows that he had very rapid ventricular tachycardia cycle length approximately 230 ms with appropriate treatment with a 35 J shock.  Patient denies exertional dyspnea or exertional chest pain. He is slightly limited by  degenerative joint disease in his right knee. He denies orthopnea PND or peripheral edema. He has had a diagnosis of obstructive sleep apnea but does not use CPAP. He is a non-smoker.  Personally reviewed. ICD interrogation today shows other brief episodes of nonsustained VT but no VT therapies. RV pacing and sensing thresholds are excellent estimated battery longevity is 5.4 years. He seldom paces.       Physical Examination Review of Systems   /70 (BP Location: Left arm, Patient Position: Sitting, Cuff Size: Adult Large)   Pulse 83   Resp 16   Wt 123.4 kg (272 lb)   BMI 39.03 kg/m    Body mass index is 39.03 kg/m .  Wt Readings from Last 3 Encounters:   12/23/21 123.4 kg (272 lb)   10/04/21 122.2 kg (269 lb 6.4 oz)   07/01/21 129.2 kg (284 lb 12.8 oz)        Appearance:   no distress, obese   HEENT:   no scleral icterus, normal conjunctivae    Neck: no carotid bruits or thyromegaly   Chest/Lungs:   lungs are clear to auscultation, no rales or wheezing, ICD in the right subclavian pocket   Cardiovascular:   Jugular venous pressure less than 5 cm, Apical pulse is regular. Normal S1,S2 with no murmurs or gallops,   Abdomen:  no  Hepatosplenomegaly., nontender,  bowel sounds are present   Extremities: no cyanosis or clubbing, No edema   Skin: no xanthelasma, warm.    Neurologic: No gross focal neurologic deficits   Mood/Affect: Alert, cooperative                                              Medical History  Surgical History Family History Social History   Past Medical History:   Diagnosis Date     Arthritis unknown    right knee     Cholecystitis      Coronary artery disease     had stents placed 08/08/1999     Hypercholesteremia      Hypertension      ICD (implantable cardioverter-defibrillator) in place      Myocardial infarction (H) 1999     Obesity      Scrotal swelling      Sleep apnea     can't tolerate cpap    Past Surgical History:   Procedure Laterality Date     CARDIAC DEFIBRILLATOR PLACEMENT  2015      CORONARY STENT PLACEMENT  08/08/1999    LAD and Circ     FRACTURE SURGERY       LAPAROSCOPIC CHOLECYSTECTOMY N/A 11/9/2018    Procedure: LAPAROSCOPIC CHOLECYSTECTOMY;  Surgeon: Mark Bee MD;  Location: [facility] Main OR;  Service: General     ORIF TIBIA & FIBULA FRACTURES Right 1945     TONSILLECTOMY  1944    Family History   Problem Relation Age of Onset     Other - See Comments Mother         gallbladder surgery     Cerebrovascular Disease Father      Coronary Artery Disease Brother     Social History     Socioeconomic History     Marital status: Single     Spouse name: Not on file     Number of children: Not on file     Years of education: Not on file     Highest education level: Not on file   Occupational History     Not on file   Tobacco Use     Smoking status: Former Smoker     Smokeless tobacco: Never Used   Substance and Sexual Activity     Alcohol use: Yes     Alcohol/week: 1.0 standard drink     Comment: Alcoholic Drinks/day: rare - only in [place]     Drug use: No     Sexual activity: Never   Other Topics Concern     Not on file   Social History Narrative    Rod Villanueva, [employer].  Graduate [employer]  Was  for [employer].  From [facility].  He is into genealogy.       Social Determinants of Health     Financial Resource Strain: Not on file   Food Insecurity: Not on file   Transportation Needs: Not on file   Physical Activity: Not on file   Stress: Not on file   Social Connections: Not on file   Intimate Partner Violence: Not on file   Housing Stability: Not on file          Medications  Allergies   Current Outpatient Medications   Medication Sig Dispense Refill     acetaminophen (TYLENOL) 325 MG tablet [ACETAMINOPHEN (TYLENOL) 325 MG TABLET] Take 2 tablets (650 mg total) by mouth every 4 (four) hours as needed.  0     clopidogrel (PLAVIX) 75 MG tablet Take 1 tablet (75 mg) by mouth daily 90 tablet 1     diphenhydrAMINE (BENADRYL) 25 MG tablet Take 1-2 tablets  (25-50 mg) by mouth nightly as needed (angioedema)       famotidine (PEPCID) 20 MG tablet Take 1 tablet (20 mg) by mouth At Bedtime 180 tablet 3     fexofenadine (ALLEGRA) 180 MG tablet Take 1 tablet (180 mg) by mouth daily 90 tablet 3     hydrochlorothiazide (HYDRODIURIL) 25 MG tablet Take 1 tablet (25 mg) by mouth daily 90 tablet 3     metoprolol succinate ER (TOPROL-XL) 100 MG 24 hr tablet Take 1 tablet (100 mg) by mouth daily 90 tablet 3     nitroglycerin (NITROSTAT) 0.4 MG SL tablet [NITROGLYCERIN (NITROSTAT) 0.4 MG SL TABLET] Place 1 tablet (0.4 mg total) under the tongue as needed. Use as directed. 30 tablet 0     simvastatin (ZOCOR) 40 MG tablet Take 1 tablet (40 mg) by mouth At Bedtime 90 tablet 3     vitamin D3 (CHOLECALCIFEROL) 50 mcg (2000 units) tablet Take 1 tablet (50 mcg) by mouth daily       predniSONE (DELTASONE) 20 MG tablet Take 2 tablets (40 mg) by mouth daily as needed (Patient not taking: Reported on 12/23/2021) 6 tablet 11      Allergies   Allergen Reactions     Ace Inhibitors Angioedema     Arb-Angiotensin Receptor Antagonist [Angiotensin Receptor Blockers] Angioedema     Aspirin Unknown     Angioedema - stopped aspirin, angioedema resolved, restarted recurred (fairly strong evidence)     Gabapentin Angioedema     Losartan Angioedema     Ramipril Angioedema     Seafood Nausea and Vomiting     Shellfish Containing Products [Shellfish-Derived Products] Nausea and Vomiting     SHRIMP- worst reaction to.

## 2021-12-23 NOTE — PATIENT INSTRUCTIONS
Chaim Dee    It was a pleasure to see you today for a clinic visit.    You had very rapid heart beating causing fainting and a shock from your defibrillator which was effective.  I have ordered a BMP and magnesium blood test  Lexiscan stress test and cardiac echo were ordered  Follow up appointment:   With Dr. Elena as scheduled  You should not drive for 6 months  Call if recurrent fainting or shocks from your defibrillator    Contact [facility] at [phone] with questions or concerns.    Jose Deng MD

## 2021-12-23 NOTE — LETTER
December 24, 2021      Chaim Dee  [address] [place] [address]        Dear ,    We are writing to inform you of your test results.    Your test results fall within the expected range(s) or remain unchanged from previous results.  Please continue with current treatment plan.    Resulted Orders   Basic metabolic panel   Result Value Ref Range    Sodium 139 136 - 145 mmol/L    Potassium 3.7 3.5 - 5.0 mmol/L    Chloride 104 98 - 107 mmol/L    Carbon Dioxide (CO2) 22 22 - 31 mmol/L    Anion Gap 13 5 - 18 mmol/L    Urea Nitrogen 24 8 - 28 mg/dL    Creatinine 1.01 0.70 - 1.30 mg/dL    Calcium 9.5 8.5 - 10.5 mg/dL    Glucose 108 70 - 125 mg/dL    GFR Estimate 74 >60 mL/min/1.73m2      Comment:      Effective December 21, 2021 eGFRcr in adults is calculated using the 2021 CKD-EPI creatinine equation which includes age and gender (Timothy et al., NEJM, DOI: 10.1056/JKDDim4955164)   Magnesium   Result Value Ref Range    Magnesium 2.0 1.8 - 2.6 mg/dL       If you have any questions or concerns, please call the clinic at the number listed above.       Sincerely,      Jose Deng MD

## 2021-12-24 LAB
MDC_IDC_LEAD_IMPLANT_DT: NORMAL
MDC_IDC_LEAD_LOCATION: NORMAL
MDC_IDC_LEAD_LOCATION_DETAIL_1: NORMAL
MDC_IDC_LEAD_MFG: NORMAL
MDC_IDC_LEAD_MODEL: NORMAL
MDC_IDC_LEAD_POLARITY_TYPE: NORMAL
MDC_IDC_LEAD_SERIAL: NORMAL
MDC_IDC_LEAD_SPECIAL_FUNCTION: NORMAL
MDC_IDC_MSMT_BATTERY_DTM: NORMAL
MDC_IDC_MSMT_BATTERY_REMAINING_LONGEVITY: 65 MO
MDC_IDC_MSMT_BATTERY_RRT_TRIGGER: 2.73
MDC_IDC_MSMT_BATTERY_STATUS: NORMAL
MDC_IDC_MSMT_BATTERY_VOLTAGE: 2.93 V
MDC_IDC_MSMT_CAP_CHARGE_DTM: NORMAL
MDC_IDC_MSMT_CAP_CHARGE_DTM: NORMAL
MDC_IDC_MSMT_CAP_CHARGE_ENERGY: 18 J
MDC_IDC_MSMT_CAP_CHARGE_ENERGY: 35 J
MDC_IDC_MSMT_CAP_CHARGE_TIME: 3.9
MDC_IDC_MSMT_CAP_CHARGE_TIME: 9.62
MDC_IDC_MSMT_CAP_CHARGE_TYPE: NORMAL
MDC_IDC_MSMT_CAP_CHARGE_TYPE: NORMAL
MDC_IDC_MSMT_LEADCHNL_RV_IMPEDANCE_VALUE: 304 OHM
MDC_IDC_MSMT_LEADCHNL_RV_IMPEDANCE_VALUE: 380 OHM
MDC_IDC_MSMT_LEADCHNL_RV_PACING_THRESHOLD_AMPLITUDE: 0.62 V
MDC_IDC_MSMT_LEADCHNL_RV_PACING_THRESHOLD_AMPLITUDE: 0.75 V
MDC_IDC_MSMT_LEADCHNL_RV_PACING_THRESHOLD_PULSEWIDTH: 0.4 MS
MDC_IDC_MSMT_LEADCHNL_RV_PACING_THRESHOLD_PULSEWIDTH: 0.4 MS
MDC_IDC_MSMT_LEADCHNL_RV_SENSING_INTR_AMPL: 5.38 MV
MDC_IDC_MSMT_LEADCHNL_RV_SENSING_INTR_AMPL: 7.3 MV
MDC_IDC_PG_IMPLANT_DTM: NORMAL
MDC_IDC_PG_MFG: NORMAL
MDC_IDC_PG_MODEL: NORMAL
MDC_IDC_PG_SERIAL: NORMAL
MDC_IDC_PG_TYPE: NORMAL
MDC_IDC_SESS_CLINIC_NAME: NORMAL
MDC_IDC_SESS_DTM: NORMAL
MDC_IDC_SESS_TYPE: NORMAL
MDC_IDC_SET_BRADY_HYSTRATE: NORMAL
MDC_IDC_SET_BRADY_LOWRATE: 40 {BEATS}/MIN
MDC_IDC_SET_BRADY_MODE: NORMAL
MDC_IDC_SET_LEADCHNL_RV_PACING_AMPLITUDE: NORMAL
MDC_IDC_SET_LEADCHNL_RV_PACING_ANODE_ELECTRODE_1: NORMAL
MDC_IDC_SET_LEADCHNL_RV_PACING_ANODE_LOCATION_1: NORMAL
MDC_IDC_SET_LEADCHNL_RV_PACING_CAPTURE_MODE: NORMAL
MDC_IDC_SET_LEADCHNL_RV_PACING_CATHODE_ELECTRODE_1: NORMAL
MDC_IDC_SET_LEADCHNL_RV_PACING_CATHODE_LOCATION_1: NORMAL
MDC_IDC_SET_LEADCHNL_RV_PACING_POLARITY: NORMAL
MDC_IDC_SET_LEADCHNL_RV_PACING_PULSEWIDTH: 0.4 MS
MDC_IDC_SET_LEADCHNL_RV_SENSING_ANODE_ELECTRODE_1: NORMAL
MDC_IDC_SET_LEADCHNL_RV_SENSING_ANODE_LOCATION_1: NORMAL
MDC_IDC_SET_LEADCHNL_RV_SENSING_CATHODE_ELECTRODE_1: NORMAL
MDC_IDC_SET_LEADCHNL_RV_SENSING_CATHODE_LOCATION_1: NORMAL
MDC_IDC_SET_LEADCHNL_RV_SENSING_POLARITY: NORMAL
MDC_IDC_SET_LEADCHNL_RV_SENSING_SENSITIVITY: 0.3 MV
MDC_IDC_SET_ZONE_DETECTION_BEATS_DENOMINATOR: 32 {BEATS}
MDC_IDC_SET_ZONE_DETECTION_BEATS_NUMERATOR: 24 {BEATS}
MDC_IDC_SET_ZONE_DETECTION_INTERVAL: 240 MS
MDC_IDC_SET_ZONE_DETECTION_INTERVAL: 320 MS
MDC_IDC_SET_ZONE_DETECTION_INTERVAL: 360 MS
MDC_IDC_SET_ZONE_DETECTION_INTERVAL: 400 MS
MDC_IDC_SET_ZONE_TYPE: NORMAL
MDC_IDC_STAT_BRADY_DTM_END: NORMAL
MDC_IDC_STAT_BRADY_DTM_START: NORMAL
MDC_IDC_STAT_BRADY_RV_PERCENT_PACED: 1.16 %
MDC_IDC_STAT_EPISODE_RECENT_COUNT: 0
MDC_IDC_STAT_EPISODE_RECENT_COUNT: 1
MDC_IDC_STAT_EPISODE_RECENT_COUNT: 11
MDC_IDC_STAT_EPISODE_RECENT_COUNT_DTM_END: NORMAL
MDC_IDC_STAT_EPISODE_RECENT_COUNT_DTM_START: NORMAL
MDC_IDC_STAT_EPISODE_TOTAL_COUNT: 0
MDC_IDC_STAT_EPISODE_TOTAL_COUNT: 1
MDC_IDC_STAT_EPISODE_TOTAL_COUNT: 2
MDC_IDC_STAT_EPISODE_TOTAL_COUNT: 26
MDC_IDC_STAT_EPISODE_TOTAL_COUNT_DTM_END: NORMAL
MDC_IDC_STAT_EPISODE_TOTAL_COUNT_DTM_START: NORMAL
MDC_IDC_STAT_EPISODE_TYPE: NORMAL
MDC_IDC_STAT_TACHYTHERAPY_ATP_DELIVERED_RECENT: 0
MDC_IDC_STAT_TACHYTHERAPY_ATP_DELIVERED_TOTAL: 0
MDC_IDC_STAT_TACHYTHERAPY_RECENT_DTM_END: NORMAL
MDC_IDC_STAT_TACHYTHERAPY_RECENT_DTM_START: NORMAL
MDC_IDC_STAT_TACHYTHERAPY_SHOCKS_ABORTED_RECENT: 0
MDC_IDC_STAT_TACHYTHERAPY_SHOCKS_ABORTED_TOTAL: 0
MDC_IDC_STAT_TACHYTHERAPY_SHOCKS_DELIVERED_RECENT: 1
MDC_IDC_STAT_TACHYTHERAPY_SHOCKS_DELIVERED_TOTAL: 1
MDC_IDC_STAT_TACHYTHERAPY_TOTAL_DTM_END: NORMAL
MDC_IDC_STAT_TACHYTHERAPY_TOTAL_DTM_START: NORMAL

## 2022-01-04 ENCOUNTER — OFFICE VISIT (OUTPATIENT)
Dept: INTERNAL MEDICINE | Facility: CLINIC | Age: 82
End: 2022-01-04
Payer: MEDICARE

## 2022-01-04 VITALS
HEART RATE: 68 BPM | SYSTOLIC BLOOD PRESSURE: 122 MMHG | BODY MASS INDEX: 39.44 KG/M2 | OXYGEN SATURATION: 98 % | DIASTOLIC BLOOD PRESSURE: 72 MMHG | WEIGHT: 274.9 LBS

## 2022-01-04 DIAGNOSIS — I25.10 CHRONIC CORONARY ARTERY DISEASE: ICD-10-CM

## 2022-01-04 DIAGNOSIS — R55 SYNCOPE, CARDIOGENIC: ICD-10-CM

## 2022-01-04 DIAGNOSIS — I47.29 PAROXYSMAL VENTRICULAR TACHYCARDIA (H): Primary | ICD-10-CM

## 2022-01-04 DIAGNOSIS — I25.5 ISCHEMIC CARDIOMYOPATHY: ICD-10-CM

## 2022-01-04 PROCEDURE — 99214 OFFICE O/P EST MOD 30 MIN: CPT | Performed by: INTERNAL MEDICINE

## 2022-01-04 RX ORDER — CLOPIDOGREL BISULFATE 75 MG/1
75 TABLET ORAL DAILY
Qty: 90 TABLET | Refills: 3 | Status: SHIPPED | OUTPATIENT
Start: 2022-01-04 | End: 2023-01-13

## 2022-01-04 NOTE — PROGRESS NOTES
Office Visit - Follow Up   Chaim Dee   81 year old male    Date of Visit: 1/4/2022    Chief Complaint   Patient presents with     Follow Up        Assessment and Plan   1. Paroxysmal ventricular tachycardia (H)  2. Syncope, cardiogenic  Patient is doing well, reviewed labs from Dr. Deng, upcoming stress test and echocardiogram.  Per my records, patient had ICD placed in 2015 for primary prevention of sudden cardiac death ischemic retinopathy ejection fraction 35%    3. Chronic coronary artery disease  As above  - clopidogrel (PLAVIX) 75 MG tablet; Take 1 tablet (75 mg) by mouth daily  Dispense: 90 tablet; Refill: 3    4. Ischemic cardiomyopathy  As above    Return in about 3 months (around 4/4/2022) for Follow up.     History of Present Illness   This 81 year old comes in for follow-up after syncopal event secondary to ventricular tachycardia.  His ICD appropriately fired.  He is generally feeling okay.  He saw Dr. Jose Deng who is ordered a stress test and echocardiogram which are scheduled.  Per review of my records, the patient had an ICD placed in 2015 for primary prophylaxis against ventricular tachycardia/ventricular fibrillation as his ejection fraction was 35%.  History of coronary artery disease and ischemic cardiomyopathy.    Review of Systems: A comprehensive review of systems was negative except as noted.     Medications, Allergies and Problem List   Reviewed, reconciled and updated  Post Discharge Medication Reconciliation Status:      Physical Exam   General Appearance:   No acute distress    /72 (BP Location: Left arm, Patient Position: Sitting, Cuff Size: Adult Regular)   Pulse 68   Wt 124.7 kg (274 lb 14.4 oz)   SpO2 98%   BMI 39.44 kg/m      Heart rate controlled rhythm regular lungs clear abdomen soft no edema     Additional Information   Current Outpatient Medications   Medication Sig Dispense Refill     acetaminophen (TYLENOL) 325 MG tablet [ACETAMINOPHEN (TYLENOL) 325  MG TABLET] Take 2 tablets (650 mg total) by mouth every 4 (four) hours as needed.  0     clopidogrel (PLAVIX) 75 MG tablet Take 1 tablet (75 mg) by mouth daily 90 tablet 3     diphenhydrAMINE (BENADRYL) 25 MG tablet Take 1-2 tablets (25-50 mg) by mouth nightly as needed (angioedema)       famotidine (PEPCID) 20 MG tablet Take 1 tablet (20 mg) by mouth At Bedtime 180 tablet 3     fexofenadine (ALLEGRA) 180 MG tablet Take 1 tablet (180 mg) by mouth daily 90 tablet 3     hydrochlorothiazide (HYDRODIURIL) 25 MG tablet Take 1 tablet (25 mg) by mouth daily 90 tablet 3     metoprolol succinate ER (TOPROL-XL) 100 MG 24 hr tablet Take 1 tablet (100 mg) by mouth daily 90 tablet 3     nitroglycerin (NITROSTAT) 0.4 MG SL tablet [NITROGLYCERIN (NITROSTAT) 0.4 MG SL TABLET] Place 1 tablet (0.4 mg total) under the tongue as needed. Use as directed. 30 tablet 0     simvastatin (ZOCOR) 40 MG tablet Take 1 tablet (40 mg) by mouth At Bedtime 90 tablet 3     vitamin D3 (CHOLECALCIFEROL) 50 mcg (2000 units) tablet Take 1 tablet (50 mcg) by mouth daily       Allergies   Allergen Reactions     Ace Inhibitors Angioedema     Arb-Angiotensin Receptor Antagonist [Angiotensin Receptor Blockers] Angioedema     Aspirin Unknown     Angioedema - stopped aspirin, angioedema resolved, restarted recurred (fairly strong evidence)     Gabapentin Angioedema     Losartan Angioedema     Ramipril Angioedema     Seafood Nausea and Vomiting     Shellfish Containing Products [Shellfish-Derived Products] Nausea and Vomiting     SHRIMP- worst reaction to.     Social History     Tobacco Use     Smoking status: Former Smoker     Smokeless tobacco: Never Used   Substance Use Topics     Alcohol use: Yes     Alcohol/week: 1.0 standard drink     Comment: Alcoholic Drinks/day: rare - only in [place]     Drug use: No       Review and/or order of clinical lab tests:  Review and/or order of radiology tests:  Review and/or order of medicine tests:  Discussion of test  results with performing physician:  Decision to obtain old records and/or obtain history from someone other than the patient:  Review and summarization of old records and/or obtaining history from someone other than the patient and.or discussion of case with another health care provider:  Independent visualization of image, tracing or specimen itself:    Time:      Luis Elena MD

## 2022-01-14 ENCOUNTER — HOSPITAL ENCOUNTER (OUTPATIENT)
Dept: CARDIOLOGY | Facility: HOSPITAL | Age: 82
End: 2022-01-14
Attending: INTERNAL MEDICINE
Payer: MEDICARE

## 2022-01-14 ENCOUNTER — HOSPITAL ENCOUNTER (OUTPATIENT)
Dept: NUCLEAR MEDICINE | Facility: HOSPITAL | Age: 82
End: 2022-01-14
Attending: INTERNAL MEDICINE
Payer: MEDICARE

## 2022-01-14 DIAGNOSIS — I47.29 PAROXYSMAL VENTRICULAR TACHYCARDIA (H): ICD-10-CM

## 2022-01-14 DIAGNOSIS — I25.5 ISCHEMIC CARDIOMYOPATHY: ICD-10-CM

## 2022-01-14 LAB
CV STRESS CURRENT BP HE: NORMAL
CV STRESS CURRENT HR HE: 102
CV STRESS CURRENT HR HE: 103
CV STRESS CURRENT HR HE: 104
CV STRESS CURRENT HR HE: 105
CV STRESS CURRENT HR HE: 105
CV STRESS CURRENT HR HE: 106
CV STRESS CURRENT HR HE: 106
CV STRESS CURRENT HR HE: 108
CV STRESS CURRENT HR HE: 90
CV STRESS CURRENT HR HE: 91
CV STRESS CURRENT HR HE: 92
CV STRESS CURRENT HR HE: 98
CV STRESS CURRENT HR HE: 99
CV STRESS DEVIATION TIME HE: NORMAL
CV STRESS ECHO PERCENT HR HE: NORMAL
CV STRESS EXERCISE STAGE HE: NORMAL
CV STRESS FINAL RESTING BP HE: NORMAL
CV STRESS FINAL RESTING HR HE: 105
CV STRESS MAX HR HE: 108
CV STRESS MAX TREADMILL GRADE HE: 0
CV STRESS MAX TREADMILL SPEED HE: 0
CV STRESS PEAK DIA BP HE: NORMAL
CV STRESS PEAK SYS BP HE: NORMAL
CV STRESS PHASE HE: NORMAL
CV STRESS PROTOCOL HE: NORMAL
CV STRESS RESTING PT POSITION HE: NORMAL
CV STRESS ST DEVIATION AMOUNT HE: NORMAL
CV STRESS ST DEVIATION ELEVATION HE: NORMAL
CV STRESS ST EVELATION AMOUNT HE: NORMAL
CV STRESS TEST TYPE HE: NORMAL
CV STRESS TOTAL STAGE TIME MIN 1 HE: NORMAL
LVEF ECHO: NORMAL
NUC STRESS EJECTION FRACTION: 44 %
RATE PRESSURE PRODUCT: NORMAL
STRESS ECHO BASELINE DIASTOLIC HE: 69
STRESS ECHO BASELINE HR: 94
STRESS ECHO BASELINE SYSTOLIC BP: 138
STRESS ECHO CALCULATED PERCENT HR: 79 %
STRESS ECHO LAST STRESS DIASTOLIC BP: 64
STRESS ECHO LAST STRESS HR: 102
STRESS ECHO LAST STRESS SYSTOLIC BP: 105
STRESS ECHO TARGET HR: 137

## 2022-01-14 PROCEDURE — 93018 CV STRESS TEST I&R ONLY: CPT | Mod: MG | Performed by: INTERNAL MEDICINE

## 2022-01-14 PROCEDURE — G1004 CDSM NDSC: HCPCS | Performed by: INTERNAL MEDICINE

## 2022-01-14 PROCEDURE — 250N000011 HC RX IP 250 OP 636: Performed by: INTERNAL MEDICINE

## 2022-01-14 PROCEDURE — 255N000002 HC RX 255 OP 636: Performed by: INTERNAL MEDICINE

## 2022-01-14 PROCEDURE — A9500 TC99M SESTAMIBI: HCPCS | Performed by: INTERNAL MEDICINE

## 2022-01-14 PROCEDURE — 93016 CV STRESS TEST SUPVJ ONLY: CPT | Performed by: INTERNAL MEDICINE

## 2022-01-14 PROCEDURE — 343N000001 HC RX 343: Performed by: INTERNAL MEDICINE

## 2022-01-14 PROCEDURE — 78452 HT MUSCLE IMAGE SPECT MULT: CPT | Mod: MG

## 2022-01-14 PROCEDURE — 93306 TTE W/DOPPLER COMPLETE: CPT | Mod: 26 | Performed by: INTERNAL MEDICINE

## 2022-01-14 PROCEDURE — 78452 HT MUSCLE IMAGE SPECT MULT: CPT | Mod: 26 | Performed by: INTERNAL MEDICINE

## 2022-01-14 PROCEDURE — 93017 CV STRESS TEST TRACING ONLY: CPT | Performed by: INTERNAL MEDICINE

## 2022-01-14 RX ORDER — AMINOPHYLLINE 25 MG/ML
50 INJECTION, SOLUTION INTRAVENOUS
Status: DISCONTINUED | OUTPATIENT
Start: 2022-01-14 | End: 2022-01-14 | Stop reason: HOSPADM

## 2022-01-14 RX ORDER — CAFFEINE 200 MG
200 TABLET ORAL
Status: DISCONTINUED | OUTPATIENT
Start: 2022-01-14 | End: 2022-01-14 | Stop reason: HOSPADM

## 2022-01-14 RX ORDER — CAFFEINE CITRATE 20 MG/ML
60 SOLUTION INTRAVENOUS
Status: DISCONTINUED | OUTPATIENT
Start: 2022-01-14 | End: 2022-01-14 | Stop reason: HOSPADM

## 2022-01-14 RX ORDER — ALBUTEROL SULFATE 0.83 MG/ML
2.5 SOLUTION RESPIRATORY (INHALATION)
Status: DISCONTINUED | OUTPATIENT
Start: 2022-01-14 | End: 2022-01-14 | Stop reason: HOSPADM

## 2022-01-14 RX ORDER — REGADENOSON 0.08 MG/ML
0.4 INJECTION, SOLUTION INTRAVENOUS ONCE
Status: COMPLETED | OUTPATIENT
Start: 2022-01-14 | End: 2022-01-14

## 2022-01-14 RX ADMIN — REGADENOSON 0.4 MG: 0.08 INJECTION, SOLUTION INTRAVENOUS at 12:47

## 2022-01-14 RX ADMIN — Medication 9.6 MCI.: at 12:34

## 2022-01-14 RX ADMIN — Medication 39.6 MILLICURIE: at 14:19

## 2022-01-14 RX ADMIN — PERFLUTREN 2 ML: 6.52 INJECTION, SUSPENSION INTRAVENOUS at 11:40

## 2022-01-27 ENCOUNTER — OFFICE VISIT (OUTPATIENT)
Dept: INTERNAL MEDICINE | Facility: CLINIC | Age: 82
End: 2022-01-27
Payer: MEDICARE

## 2022-01-27 VITALS
DIASTOLIC BLOOD PRESSURE: 74 MMHG | HEART RATE: 66 BPM | OXYGEN SATURATION: 98 % | SYSTOLIC BLOOD PRESSURE: 128 MMHG | HEIGHT: 70 IN | WEIGHT: 272.1 LBS | BODY MASS INDEX: 38.95 KG/M2

## 2022-01-27 DIAGNOSIS — I47.29 PAROXYSMAL VENTRICULAR TACHYCARDIA (H): Primary | ICD-10-CM

## 2022-01-27 DIAGNOSIS — I50.42 CHRONIC COMBINED SYSTOLIC AND DIASTOLIC HEART FAILURE (H): ICD-10-CM

## 2022-01-27 DIAGNOSIS — I10 PRIMARY HYPERTENSION: ICD-10-CM

## 2022-01-27 DIAGNOSIS — I25.10 CHRONIC CORONARY ARTERY DISEASE: ICD-10-CM

## 2022-01-27 DIAGNOSIS — Z95.810 ICD (IMPLANTABLE CARDIOVERTER-DEFIBRILLATOR), SINGLE, IN SITU: ICD-10-CM

## 2022-01-27 DIAGNOSIS — R55 SYNCOPE, CARDIOGENIC: ICD-10-CM

## 2022-01-27 PROCEDURE — 99214 OFFICE O/P EST MOD 30 MIN: CPT | Performed by: INTERNAL MEDICINE

## 2022-01-27 ASSESSMENT — MIFFLIN-ST. JEOR: SCORE: 1935.49

## 2022-01-27 NOTE — PROGRESS NOTES
"  Office Visit - Follow Up   Chaim Dee   81 year old male    Date of Visit: 1/27/2022    Chief Complaint   Patient presents with     RECHECK     test results from cardiology        Assessment and Plan   1. Paroxysmal ventricular tachycardia (H)  2. ICD (implantable cardioverter-defibrillator), single, in situ  3. Syncope, cardiogenic  Reviewed stress test and echocardiogram.  All look okay/stable.  Questions about driving, my understanding and state law regulation disallows driving for 6 months.  I shared this with him    4. 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 Chronic coronary artery disease  Stable continue secondary prevention    6. Primary hypertension  Blood pressure okay continue same    Return in about 3 months (around 4/27/2022) for Follow up.     History of Present Illness   This 81 year old man comes in for follow-up.  On December 21 he had syncopal episode secondary to ventricular arrhythmia which was treated by his ICD appropriately.  He ended up seeing electrophysiology and had a stress test and echo.  The stress test shows old infarction but low risk for future infarctions and his echo actually showed improvement in his ejection fraction to 55 to 60%.  He is feeling well.  Questions about driving.    Review of Systems: A comprehensive review of systems was negative except as noted.     Medications, Allergies and Problem List   Reviewed, reconciled and updated  Post Discharge Medication Reconciliation Status:      Physical Exam   General Appearance:   No acute distress    /74 (BP Location: Left arm, Patient Position: Sitting, Cuff Size: Adult Regular)   Pulse 66   Ht 1.778 m (5' 10\")   Wt 123.4 kg (272 lb 1.6 oz)   SpO2 98%   BMI 39.04 kg/m      HEENT exam is unremarkable  Cardiovascular regular rate and rhythm no murmur gallop or rub  Pulmonary lungs are clear to auscultation bilaterally  Neurologic exam is non focal  Psychiatric pleasant, no " confusion or agitation        Additional Information   Current Outpatient Medications   Medication Sig Dispense Refill     acetaminophen (TYLENOL) 325 MG tablet [ACETAMINOPHEN (TYLENOL) 325 MG TABLET] Take 2 tablets (650 mg total) by mouth every 4 (four) hours as needed.  0     clopidogrel (PLAVIX) 75 MG tablet Take 1 tablet (75 mg) by mouth daily 90 tablet 3     diphenhydrAMINE (BENADRYL) 25 MG tablet Take 1-2 tablets (25-50 mg) by mouth nightly as needed (angioedema)       famotidine (PEPCID) 20 MG tablet Take 1 tablet (20 mg) by mouth At Bedtime 180 tablet 3     fexofenadine (ALLEGRA) 180 MG tablet Take 1 tablet (180 mg) by mouth daily 90 tablet 3     hydrochlorothiazide (HYDRODIURIL) 25 MG tablet Take 1 tablet (25 mg) by mouth daily 90 tablet 3     metoprolol succinate ER (TOPROL-XL) 100 MG 24 hr tablet Take 1 tablet (100 mg) by mouth daily 90 tablet 3     nitroglycerin (NITROSTAT) 0.4 MG SL tablet [NITROGLYCERIN (NITROSTAT) 0.4 MG SL TABLET] Place 1 tablet (0.4 mg total) under the tongue as needed. Use as directed. 30 tablet 0     simvastatin (ZOCOR) 40 MG tablet Take 1 tablet (40 mg) by mouth At Bedtime 90 tablet 3     vitamin D3 (CHOLECALCIFEROL) 50 mcg (2000 units) tablet Take 1 tablet (50 mcg) by mouth daily       Allergies   Allergen Reactions     Ace Inhibitors Angioedema     Arb-Angiotensin Receptor Antagonist [Angiotensin Receptor Blockers] Angioedema     Aspirin Unknown     Angioedema - stopped aspirin, angioedema resolved, restarted recurred (fairly strong evidence)     Gabapentin Angioedema     Losartan Angioedema     Ramipril Angioedema     Seafood Nausea and Vomiting     Shellfish Containing Products [Shellfish-Derived Products] Nausea and Vomiting     SHRIMP- worst reaction to.     Social History     Tobacco Use     Smoking status: Former Smoker     Smokeless tobacco: Never Used   Substance Use Topics     Alcohol use: Yes     Alcohol/week: 1.0 standard drink     Comment: Alcoholic Drinks/day: rare  - only in [place]     Drug use: No       Review and/or order of clinical lab tests:  Review and/or order of radiology tests:  Review and/or order of medicine tests:  Discussion of test results with performing physician:  Decision to obtain old records and/or obtain history from someone other than the patient:  Review and summarization of old records and/or obtaining history from someone other than the patient and.or discussion of case with another health care provider:  Independent visualization of image, tracing or specimen itself:    Time:      Luis Elena MD

## 2022-01-27 NOTE — LETTER
January 27, 2022      Chaim Dee  [address] [place] [address]        To Whom It May Concern:    Chaim Dee had an appropriate discharge from his ICD.  State regulation disallows driving for 6 months after appropriate discharge from an ICD.    Sincerely,        Luis Elena MD

## 2022-01-27 NOTE — Clinical Note
I just saw Rod Villanueva.  He was thinking he could drive now after his stress test and echo.  I think state law is that he can't drive for 6 months, which is what you told him.  He asked me to just double check with you.  Thanks!

## 2022-04-07 ENCOUNTER — ANCILLARY PROCEDURE (OUTPATIENT)
Dept: CARDIOLOGY | Facility: CLINIC | Age: 82
End: 2022-04-07
Attending: INTERNAL MEDICINE
Payer: MEDICARE

## 2022-04-07 DIAGNOSIS — I25.5 ISCHEMIC CARDIOMYOPATHY: ICD-10-CM

## 2022-04-07 DIAGNOSIS — Z95.810 ICD (IMPLANTABLE CARDIOVERTER-DEFIBRILLATOR) IN PLACE: ICD-10-CM

## 2022-04-07 LAB
MDC_IDC_EPISODE_DTM: NORMAL
MDC_IDC_EPISODE_DURATION: 0 S
MDC_IDC_EPISODE_DURATION: 1 S
MDC_IDC_EPISODE_DURATION: 2 S
MDC_IDC_EPISODE_DURATION: 3 S
MDC_IDC_EPISODE_DURATION: 3 S
MDC_IDC_EPISODE_DURATION: 6 S
MDC_IDC_EPISODE_DURATION: 6 S
MDC_IDC_EPISODE_ID: 82
MDC_IDC_EPISODE_ID: 83
MDC_IDC_EPISODE_ID: 84
MDC_IDC_EPISODE_ID: 85
MDC_IDC_EPISODE_ID: 86
MDC_IDC_EPISODE_ID: 87
MDC_IDC_EPISODE_ID: 88
MDC_IDC_EPISODE_ID: 89
MDC_IDC_EPISODE_ID: 90
MDC_IDC_EPISODE_ID: 91
MDC_IDC_EPISODE_ID: 92
MDC_IDC_EPISODE_ID: 93
MDC_IDC_EPISODE_ID: 94
MDC_IDC_EPISODE_ID: 95
MDC_IDC_EPISODE_ID: 96
MDC_IDC_EPISODE_ID: 97
MDC_IDC_EPISODE_TYPE: NORMAL
MDC_IDC_LEAD_IMPLANT_DT: NORMAL
MDC_IDC_LEAD_LOCATION: NORMAL
MDC_IDC_LEAD_LOCATION_DETAIL_1: NORMAL
MDC_IDC_LEAD_MFG: NORMAL
MDC_IDC_LEAD_MODEL: NORMAL
MDC_IDC_LEAD_POLARITY_TYPE: NORMAL
MDC_IDC_LEAD_SERIAL: NORMAL
MDC_IDC_LEAD_SPECIAL_FUNCTION: NORMAL
MDC_IDC_MSMT_BATTERY_DTM: NORMAL
MDC_IDC_MSMT_BATTERY_REMAINING_LONGEVITY: 58 MO
MDC_IDC_MSMT_BATTERY_RRT_TRIGGER: 2.73
MDC_IDC_MSMT_BATTERY_STATUS: NORMAL
MDC_IDC_MSMT_BATTERY_VOLTAGE: 3 V
MDC_IDC_MSMT_CAP_CHARGE_DTM: NORMAL
MDC_IDC_MSMT_CAP_CHARGE_ENERGY: 18 J
MDC_IDC_MSMT_CAP_CHARGE_TIME: 3.94
MDC_IDC_MSMT_CAP_CHARGE_TYPE: NORMAL
MDC_IDC_MSMT_LEADCHNL_RV_IMPEDANCE_VALUE: 304 OHM
MDC_IDC_MSMT_LEADCHNL_RV_IMPEDANCE_VALUE: 342 OHM
MDC_IDC_MSMT_LEADCHNL_RV_PACING_THRESHOLD_AMPLITUDE: 0.62 V
MDC_IDC_MSMT_LEADCHNL_RV_PACING_THRESHOLD_PULSEWIDTH: 0.4 MS
MDC_IDC_MSMT_LEADCHNL_RV_SENSING_INTR_AMPL: 4.88 MV
MDC_IDC_MSMT_LEADCHNL_RV_SENSING_INTR_AMPL: 4.88 MV
MDC_IDC_PG_IMPLANT_DTM: NORMAL
MDC_IDC_PG_MFG: NORMAL
MDC_IDC_PG_MODEL: NORMAL
MDC_IDC_PG_SERIAL: NORMAL
MDC_IDC_PG_TYPE: NORMAL
MDC_IDC_SESS_CLINIC_NAME: NORMAL
MDC_IDC_SESS_DTM: NORMAL
MDC_IDC_SESS_TYPE: NORMAL
MDC_IDC_SET_BRADY_HYSTRATE: NORMAL
MDC_IDC_SET_BRADY_LOWRATE: 40 {BEATS}/MIN
MDC_IDC_SET_BRADY_MODE: NORMAL
MDC_IDC_SET_LEADCHNL_RV_PACING_AMPLITUDE: 1.5 V
MDC_IDC_SET_LEADCHNL_RV_PACING_ANODE_ELECTRODE_1: NORMAL
MDC_IDC_SET_LEADCHNL_RV_PACING_ANODE_LOCATION_1: NORMAL
MDC_IDC_SET_LEADCHNL_RV_PACING_CAPTURE_MODE: NORMAL
MDC_IDC_SET_LEADCHNL_RV_PACING_CATHODE_ELECTRODE_1: NORMAL
MDC_IDC_SET_LEADCHNL_RV_PACING_CATHODE_LOCATION_1: NORMAL
MDC_IDC_SET_LEADCHNL_RV_PACING_POLARITY: NORMAL
MDC_IDC_SET_LEADCHNL_RV_PACING_PULSEWIDTH: 0.4 MS
MDC_IDC_SET_LEADCHNL_RV_SENSING_ANODE_ELECTRODE_1: NORMAL
MDC_IDC_SET_LEADCHNL_RV_SENSING_ANODE_LOCATION_1: NORMAL
MDC_IDC_SET_LEADCHNL_RV_SENSING_CATHODE_ELECTRODE_1: NORMAL
MDC_IDC_SET_LEADCHNL_RV_SENSING_CATHODE_LOCATION_1: NORMAL
MDC_IDC_SET_LEADCHNL_RV_SENSING_POLARITY: NORMAL
MDC_IDC_SET_LEADCHNL_RV_SENSING_SENSITIVITY: 0.3 MV
MDC_IDC_SET_ZONE_DETECTION_BEATS_DENOMINATOR: 32 {BEATS}
MDC_IDC_SET_ZONE_DETECTION_BEATS_NUMERATOR: 24 {BEATS}
MDC_IDC_SET_ZONE_DETECTION_INTERVAL: 240 MS
MDC_IDC_SET_ZONE_DETECTION_INTERVAL: 320 MS
MDC_IDC_SET_ZONE_DETECTION_INTERVAL: 360 MS
MDC_IDC_SET_ZONE_DETECTION_INTERVAL: 400 MS
MDC_IDC_SET_ZONE_TYPE: NORMAL
MDC_IDC_STAT_BRADY_DTM_END: NORMAL
MDC_IDC_STAT_BRADY_DTM_START: NORMAL
MDC_IDC_STAT_BRADY_RV_PERCENT_PACED: 0.24 %
MDC_IDC_STAT_EPISODE_RECENT_COUNT: 0
MDC_IDC_STAT_EPISODE_RECENT_COUNT: 1
MDC_IDC_STAT_EPISODE_RECENT_COUNT: 65
MDC_IDC_STAT_EPISODE_RECENT_COUNT_DTM_END: NORMAL
MDC_IDC_STAT_EPISODE_RECENT_COUNT_DTM_START: NORMAL
MDC_IDC_STAT_EPISODE_TOTAL_COUNT: 0
MDC_IDC_STAT_EPISODE_TOTAL_COUNT: 1
MDC_IDC_STAT_EPISODE_TOTAL_COUNT: 3
MDC_IDC_STAT_EPISODE_TOTAL_COUNT: 91
MDC_IDC_STAT_EPISODE_TOTAL_COUNT_DTM_END: NORMAL
MDC_IDC_STAT_EPISODE_TOTAL_COUNT_DTM_START: NORMAL
MDC_IDC_STAT_EPISODE_TYPE: NORMAL
MDC_IDC_STAT_TACHYTHERAPY_ATP_DELIVERED_RECENT: 0
MDC_IDC_STAT_TACHYTHERAPY_ATP_DELIVERED_TOTAL: 0
MDC_IDC_STAT_TACHYTHERAPY_RECENT_DTM_END: NORMAL
MDC_IDC_STAT_TACHYTHERAPY_RECENT_DTM_START: NORMAL
MDC_IDC_STAT_TACHYTHERAPY_SHOCKS_ABORTED_RECENT: 0
MDC_IDC_STAT_TACHYTHERAPY_SHOCKS_ABORTED_TOTAL: 0
MDC_IDC_STAT_TACHYTHERAPY_SHOCKS_DELIVERED_RECENT: 0
MDC_IDC_STAT_TACHYTHERAPY_SHOCKS_DELIVERED_TOTAL: 1
MDC_IDC_STAT_TACHYTHERAPY_TOTAL_DTM_END: NORMAL
MDC_IDC_STAT_TACHYTHERAPY_TOTAL_DTM_START: NORMAL

## 2022-04-07 PROCEDURE — 93296 REM INTERROG EVL PM/IDS: CPT | Performed by: INTERNAL MEDICINE

## 2022-04-07 PROCEDURE — 93295 DEV INTERROG REMOTE 1/2/MLT: CPT | Performed by: INTERNAL MEDICINE

## 2022-05-03 ENCOUNTER — OFFICE VISIT (OUTPATIENT)
Dept: INTERNAL MEDICINE | Facility: CLINIC | Age: 82
End: 2022-05-03
Payer: MEDICARE

## 2022-05-03 VITALS
DIASTOLIC BLOOD PRESSURE: 66 MMHG | SYSTOLIC BLOOD PRESSURE: 118 MMHG | OXYGEN SATURATION: 98 % | RESPIRATION RATE: 16 BRPM | HEIGHT: 70 IN | HEART RATE: 68 BPM | WEIGHT: 265.12 LBS | BODY MASS INDEX: 37.96 KG/M2

## 2022-05-03 DIAGNOSIS — Z23 HIGH PRIORITY FOR 2019-NCOV VACCINE: ICD-10-CM

## 2022-05-03 DIAGNOSIS — I50.42 CHRONIC COMBINED SYSTOLIC AND DIASTOLIC HEART FAILURE (H): ICD-10-CM

## 2022-05-03 DIAGNOSIS — Z95.810 ICD (IMPLANTABLE CARDIOVERTER-DEFIBRILLATOR), SINGLE, IN SITU: ICD-10-CM

## 2022-05-03 DIAGNOSIS — I25.5 ISCHEMIC CARDIOMYOPATHY: ICD-10-CM

## 2022-05-03 DIAGNOSIS — I10 PRIMARY HYPERTENSION: ICD-10-CM

## 2022-05-03 DIAGNOSIS — I25.10 CHRONIC CORONARY ARTERY DISEASE: ICD-10-CM

## 2022-05-03 DIAGNOSIS — E78.2 MIXED HYPERLIPIDEMIA: ICD-10-CM

## 2022-05-03 DIAGNOSIS — I10 ESSENTIAL HYPERTENSION: ICD-10-CM

## 2022-05-03 DIAGNOSIS — I47.29 PAROXYSMAL VENTRICULAR TACHYCARDIA (H): Primary | ICD-10-CM

## 2022-05-03 PROCEDURE — 91305 COVID-19,PF,PFIZER (12+ YRS): CPT | Performed by: INTERNAL MEDICINE

## 2022-05-03 PROCEDURE — 0054A COVID-19,PF,PFIZER (12+ YRS): CPT | Performed by: INTERNAL MEDICINE

## 2022-05-03 PROCEDURE — 99214 OFFICE O/P EST MOD 30 MIN: CPT | Performed by: INTERNAL MEDICINE

## 2022-05-03 RX ORDER — SIMVASTATIN 40 MG
40 TABLET ORAL AT BEDTIME
Qty: 90 TABLET | Refills: 3 | Status: SHIPPED | OUTPATIENT
Start: 2022-05-03 | End: 2023-04-09

## 2022-05-03 RX ORDER — METOPROLOL SUCCINATE 100 MG/1
100 TABLET, EXTENDED RELEASE ORAL DAILY
Qty: 90 TABLET | Refills: 3 | Status: SHIPPED | OUTPATIENT
Start: 2022-05-03 | End: 2023-04-11

## 2022-05-03 RX ORDER — HYDROCHLOROTHIAZIDE 25 MG/1
25 TABLET ORAL DAILY
Qty: 90 TABLET | Refills: 3 | Status: SHIPPED | OUTPATIENT
Start: 2022-05-03 | End: 2023-04-09

## 2022-05-03 ASSESSMENT — PAIN SCALES - GENERAL: PAINLEVEL: NO PAIN (0)

## 2022-05-03 NOTE — PROGRESS NOTES
Office Visit - Follow Up   Chaim Dee   82 year old male    Date of Visit: 5/3/2022    Chief Complaint   Patient presents with     Recheck Medication     RECHECK     3 month followup  Talk about driving again     Imm/Inj     COVID-19 VACCINE        Assessment and Plan   1. Paroxysmal ventricular tachycardia (H)  2. ICD (implantable cardioverter-defibrillator), single, in situ  Not driving until the middle of June    3. Chronic combined systolic and diastolic heart failure (H)  Appears euvolemic    4. Chronic coronary artery disease  Continue secondary prevention    5. Ischemic cardiomyopathy    6. Primary hypertension  Controlled continue same    7. Essential hypertension  - hydrochlorothiazide (HYDRODIURIL) 25 MG tablet; Take 1 tablet (25 mg) by mouth daily  Dispense: 90 tablet; Refill: 3  - metoprolol succinate ER (TOPROL-XL) 100 MG 24 hr tablet; Take 1 tablet (100 mg) by mouth daily  Dispense: 90 tablet; Refill: 3    8. Mixed hyperlipidemia  - simvastatin (ZOCOR) 40 MG tablet; Take 1 tablet (40 mg) by mouth At Bedtime  Dispense: 90 tablet; Refill: 3    9. High priority for 2019-nCoV vaccine  - COVID-19,PF,PFIZER (12+ Yrs GRAY LABEL)      Return in about 6 months (around 11/3/2022) for Follow up.     History of Present Illness   This 82 year old man comes in for follow-up.  Overall doing well.  He has not had any episodes of ventricular tachycardia or ICD activation.  He has lost some weight.  He would like another COVID-vaccine.  History of angioedema after the first vaccine but has done well with the second 2 and has a plan in place with prednisone and Benadryl    Review of Systems: A comprehensive review of systems was negative except as noted.     Medications, Allergies and Problem List   Reviewed, reconciled and updated  Post Discharge Medication Reconciliation Status:   Social History     Social History Narrative    [employer], [employer].  Graduate [employer]  Was  for  "[employer].  From [facility].  He is into genealogy.          Physical Exam   General Appearance:   No acute distress    /66 (BP Location: Left arm, Patient Position: Sitting, Cuff Size: Adult Large)   Pulse 68   Resp 16   Ht 1.778 m (5' 10\")   Wt 120.3 kg (265 lb 1.9 oz)   SpO2 98%   BMI 38.04 kg/m           Additional Information   Current Outpatient Medications   Medication Sig Dispense Refill     acetaminophen (TYLENOL) 325 MG tablet [ACETAMINOPHEN (TYLENOL) 325 MG TABLET] Take 2 tablets (650 mg total) by mouth every 4 (four) hours as needed.  0     clopidogrel (PLAVIX) 75 MG tablet Take 1 tablet (75 mg) by mouth daily 90 tablet 3     diphenhydrAMINE (BENADRYL) 25 MG tablet Take 1-2 tablets (25-50 mg) by mouth nightly as needed (angioedema)       famotidine (PEPCID) 20 MG tablet Take 1 tablet (20 mg) by mouth At Bedtime 180 tablet 3     fexofenadine (ALLEGRA) 180 MG tablet Take 1 tablet (180 mg) by mouth daily 90 tablet 3     hydrochlorothiazide (HYDRODIURIL) 25 MG tablet Take 1 tablet (25 mg) by mouth daily 90 tablet 3     metoprolol succinate ER (TOPROL-XL) 100 MG 24 hr tablet Take 1 tablet (100 mg) by mouth daily 90 tablet 3     nitroglycerin (NITROSTAT) 0.4 MG SL tablet [NITROGLYCERIN (NITROSTAT) 0.4 MG SL TABLET] Place 1 tablet (0.4 mg total) under the tongue as needed. Use as directed. 30 tablet 0     simvastatin (ZOCOR) 40 MG tablet Take 1 tablet (40 mg) by mouth At Bedtime 90 tablet 3     vitamin D3 (CHOLECALCIFEROL) 50 mcg (2000 units) tablet Take 1 tablet (50 mcg) by mouth daily       Allergies   Allergen Reactions     Ace Inhibitors Angioedema     Arb-Angiotensin Receptor Antagonist [Angiotensin Receptor Blockers] Angioedema     Aspirin Unknown     Angioedema - stopped aspirin, angioedema resolved, restarted recurred (fairly strong evidence)     Gabapentin Angioedema     Losartan Angioedema     Ramipril Angioedema     Seafood Nausea and Vomiting     Shellfish Containing Products " [Shellfish-Derived Products] Nausea and Vomiting     SHRIMP- worst reaction to.     Social History     Tobacco Use     Smoking status: Former Smoker     Smokeless tobacco: Never Used   Substance Use Topics     Alcohol use: Yes     Alcohol/week: 1.0 standard drink     Comment: Alcoholic Drinks/day: rare - only in [place]     Drug use: No       Review and/or order of clinical lab tests:  Review and/or order of radiology tests:  Review and/or order of medicine tests:  Discussion of test results with performing physician:  Decision to obtain old records and/or obtain history from someone other than the patient:  Review and summarization of old records and/or obtaining history from someone other than the patient and.or discussion of case with another health care provider:  Independent visualization of image, tracing or specimen itself:    Time:      Luis Elena MD  Answers for HPI/ROS submitted by the patient on 5/3/2022  How many servings of fruits and vegetables do you eat daily?: 2-3  On average, how many sweetened beverages do you drink each day (Examples: soda, juice, sweet tea, etc.  Do NOT count diet or artificially sweetened beverages)?: 0  How many minutes a day do you exercise enough to make your heart beat faster?: 9 or less  How many days a week do you exercise enough to make your heart beat faster?: 3 or less  How many days per week do you miss taking your medication?: 0  What is the reason for your visit today?: Follow up  When did your symptoms begin?: Today  What are your symptoms?: No symptoms  How would you describe these symptoms?: Mild  Are your symptoms:: Staying the same  Have you had these symptoms before?: No

## 2022-05-22 ENCOUNTER — HEALTH MAINTENANCE LETTER (OUTPATIENT)
Age: 82
End: 2022-05-22

## 2022-07-25 ENCOUNTER — ANCILLARY PROCEDURE (OUTPATIENT)
Dept: CARDIOLOGY | Facility: CLINIC | Age: 82
End: 2022-07-25
Attending: INTERNAL MEDICINE
Payer: MEDICARE

## 2022-07-25 DIAGNOSIS — Z95.810 ICD (IMPLANTABLE CARDIOVERTER-DEFIBRILLATOR) IN PLACE: ICD-10-CM

## 2022-07-25 DIAGNOSIS — I25.5 ISCHEMIC CARDIOMYOPATHY: ICD-10-CM

## 2022-07-25 LAB
MDC_IDC_EPISODE_DTM: NORMAL
MDC_IDC_EPISODE_DURATION: 1 S
MDC_IDC_EPISODE_ID: 98
MDC_IDC_EPISODE_TYPE: NORMAL
MDC_IDC_LEAD_IMPLANT_DT: NORMAL
MDC_IDC_LEAD_LOCATION: NORMAL
MDC_IDC_LEAD_LOCATION_DETAIL_1: NORMAL
MDC_IDC_LEAD_MFG: NORMAL
MDC_IDC_LEAD_MODEL: NORMAL
MDC_IDC_LEAD_POLARITY_TYPE: NORMAL
MDC_IDC_LEAD_SERIAL: NORMAL
MDC_IDC_LEAD_SPECIAL_FUNCTION: NORMAL
MDC_IDC_MSMT_BATTERY_DTM: NORMAL
MDC_IDC_MSMT_BATTERY_REMAINING_LONGEVITY: 56 MO
MDC_IDC_MSMT_BATTERY_RRT_TRIGGER: 2.73
MDC_IDC_MSMT_BATTERY_STATUS: NORMAL
MDC_IDC_MSMT_BATTERY_VOLTAGE: 3 V
MDC_IDC_MSMT_CAP_CHARGE_DTM: NORMAL
MDC_IDC_MSMT_CAP_CHARGE_ENERGY: 18 J
MDC_IDC_MSMT_CAP_CHARGE_TIME: 3.96
MDC_IDC_MSMT_CAP_CHARGE_TYPE: NORMAL
MDC_IDC_MSMT_LEADCHNL_RV_IMPEDANCE_VALUE: 266 OHM
MDC_IDC_MSMT_LEADCHNL_RV_IMPEDANCE_VALUE: 323 OHM
MDC_IDC_MSMT_LEADCHNL_RV_PACING_THRESHOLD_AMPLITUDE: 0.62 V
MDC_IDC_MSMT_LEADCHNL_RV_PACING_THRESHOLD_PULSEWIDTH: 0.4 MS
MDC_IDC_MSMT_LEADCHNL_RV_SENSING_INTR_AMPL: 4.12 MV
MDC_IDC_MSMT_LEADCHNL_RV_SENSING_INTR_AMPL: 4.12 MV
MDC_IDC_PG_IMPLANT_DTM: NORMAL
MDC_IDC_PG_MFG: NORMAL
MDC_IDC_PG_MODEL: NORMAL
MDC_IDC_PG_SERIAL: NORMAL
MDC_IDC_PG_TYPE: NORMAL
MDC_IDC_SESS_CLINIC_NAME: NORMAL
MDC_IDC_SESS_DTM: NORMAL
MDC_IDC_SESS_TYPE: NORMAL
MDC_IDC_SET_BRADY_HYSTRATE: NORMAL
MDC_IDC_SET_BRADY_LOWRATE: 40 {BEATS}/MIN
MDC_IDC_SET_BRADY_MODE: NORMAL
MDC_IDC_SET_LEADCHNL_RV_PACING_AMPLITUDE: 1.5 V
MDC_IDC_SET_LEADCHNL_RV_PACING_ANODE_ELECTRODE_1: NORMAL
MDC_IDC_SET_LEADCHNL_RV_PACING_ANODE_LOCATION_1: NORMAL
MDC_IDC_SET_LEADCHNL_RV_PACING_CAPTURE_MODE: NORMAL
MDC_IDC_SET_LEADCHNL_RV_PACING_CATHODE_ELECTRODE_1: NORMAL
MDC_IDC_SET_LEADCHNL_RV_PACING_CATHODE_LOCATION_1: NORMAL
MDC_IDC_SET_LEADCHNL_RV_PACING_POLARITY: NORMAL
MDC_IDC_SET_LEADCHNL_RV_PACING_PULSEWIDTH: 0.4 MS
MDC_IDC_SET_LEADCHNL_RV_SENSING_ANODE_ELECTRODE_1: NORMAL
MDC_IDC_SET_LEADCHNL_RV_SENSING_ANODE_LOCATION_1: NORMAL
MDC_IDC_SET_LEADCHNL_RV_SENSING_CATHODE_ELECTRODE_1: NORMAL
MDC_IDC_SET_LEADCHNL_RV_SENSING_CATHODE_LOCATION_1: NORMAL
MDC_IDC_SET_LEADCHNL_RV_SENSING_POLARITY: NORMAL
MDC_IDC_SET_LEADCHNL_RV_SENSING_SENSITIVITY: 0.3 MV
MDC_IDC_SET_ZONE_DETECTION_BEATS_DENOMINATOR: 32 {BEATS}
MDC_IDC_SET_ZONE_DETECTION_BEATS_NUMERATOR: 24 {BEATS}
MDC_IDC_SET_ZONE_DETECTION_INTERVAL: 240 MS
MDC_IDC_SET_ZONE_DETECTION_INTERVAL: 320 MS
MDC_IDC_SET_ZONE_DETECTION_INTERVAL: 360 MS
MDC_IDC_SET_ZONE_DETECTION_INTERVAL: 400 MS
MDC_IDC_SET_ZONE_TYPE: NORMAL
MDC_IDC_STAT_BRADY_DTM_END: NORMAL
MDC_IDC_STAT_BRADY_DTM_START: NORMAL
MDC_IDC_STAT_BRADY_RV_PERCENT_PACED: 0.91 %
MDC_IDC_STAT_EPISODE_RECENT_COUNT: 0
MDC_IDC_STAT_EPISODE_RECENT_COUNT: 1
MDC_IDC_STAT_EPISODE_RECENT_COUNT_DTM_END: NORMAL
MDC_IDC_STAT_EPISODE_RECENT_COUNT_DTM_START: NORMAL
MDC_IDC_STAT_EPISODE_TOTAL_COUNT: 0
MDC_IDC_STAT_EPISODE_TOTAL_COUNT: 1
MDC_IDC_STAT_EPISODE_TOTAL_COUNT: 3
MDC_IDC_STAT_EPISODE_TOTAL_COUNT: 92
MDC_IDC_STAT_EPISODE_TOTAL_COUNT_DTM_END: NORMAL
MDC_IDC_STAT_EPISODE_TOTAL_COUNT_DTM_START: NORMAL
MDC_IDC_STAT_EPISODE_TYPE: NORMAL
MDC_IDC_STAT_TACHYTHERAPY_ATP_DELIVERED_RECENT: 0
MDC_IDC_STAT_TACHYTHERAPY_ATP_DELIVERED_TOTAL: 0
MDC_IDC_STAT_TACHYTHERAPY_RECENT_DTM_END: NORMAL
MDC_IDC_STAT_TACHYTHERAPY_RECENT_DTM_START: NORMAL
MDC_IDC_STAT_TACHYTHERAPY_SHOCKS_ABORTED_RECENT: 0
MDC_IDC_STAT_TACHYTHERAPY_SHOCKS_ABORTED_TOTAL: 0
MDC_IDC_STAT_TACHYTHERAPY_SHOCKS_DELIVERED_RECENT: 0
MDC_IDC_STAT_TACHYTHERAPY_SHOCKS_DELIVERED_TOTAL: 1
MDC_IDC_STAT_TACHYTHERAPY_TOTAL_DTM_END: NORMAL
MDC_IDC_STAT_TACHYTHERAPY_TOTAL_DTM_START: NORMAL

## 2022-07-25 PROCEDURE — 93296 REM INTERROG EVL PM/IDS: CPT | Performed by: INTERNAL MEDICINE

## 2022-07-25 PROCEDURE — 93295 DEV INTERROG REMOTE 1/2/MLT: CPT | Performed by: INTERNAL MEDICINE

## 2022-09-25 ENCOUNTER — HEALTH MAINTENANCE LETTER (OUTPATIENT)
Age: 82
End: 2022-09-25

## 2022-10-31 ENCOUNTER — ANCILLARY PROCEDURE (OUTPATIENT)
Dept: CARDIOLOGY | Facility: CLINIC | Age: 82
End: 2022-10-31
Attending: INTERNAL MEDICINE
Payer: MEDICARE

## 2022-10-31 DIAGNOSIS — I25.5 ISCHEMIC CARDIOMYOPATHY: ICD-10-CM

## 2022-10-31 DIAGNOSIS — Z95.810 ICD (IMPLANTABLE CARDIOVERTER-DEFIBRILLATOR) IN PLACE: ICD-10-CM

## 2022-10-31 PROCEDURE — 93296 REM INTERROG EVL PM/IDS: CPT | Performed by: INTERNAL MEDICINE

## 2022-10-31 PROCEDURE — 93295 DEV INTERROG REMOTE 1/2/MLT: CPT | Performed by: INTERNAL MEDICINE

## 2022-11-01 ENCOUNTER — OFFICE VISIT (OUTPATIENT)
Dept: INTERNAL MEDICINE | Facility: CLINIC | Age: 82
End: 2022-11-01
Payer: MEDICARE

## 2022-11-01 VITALS
OXYGEN SATURATION: 94 % | RESPIRATION RATE: 18 BRPM | TEMPERATURE: 96.8 F | DIASTOLIC BLOOD PRESSURE: 72 MMHG | BODY MASS INDEX: 39.78 KG/M2 | SYSTOLIC BLOOD PRESSURE: 126 MMHG | WEIGHT: 277.9 LBS | HEART RATE: 68 BPM | HEIGHT: 70 IN

## 2022-11-01 DIAGNOSIS — E78.5 HYPERLIPIDEMIA WITH TARGET LOW DENSITY LIPOPROTEIN (LDL) CHOLESTEROL LESS THAN 70 MG/DL: ICD-10-CM

## 2022-11-01 DIAGNOSIS — Z00.00 ANNUAL PHYSICAL EXAM: Primary | ICD-10-CM

## 2022-11-01 DIAGNOSIS — K21.9 GASTROESOPHAGEAL REFLUX DISEASE WITHOUT ESOPHAGITIS: ICD-10-CM

## 2022-11-01 DIAGNOSIS — Z95.810 ICD (IMPLANTABLE CARDIOVERTER-DEFIBRILLATOR), SINGLE, IN SITU: ICD-10-CM

## 2022-11-01 DIAGNOSIS — I25.10 CHRONIC CORONARY ARTERY DISEASE: ICD-10-CM

## 2022-11-01 DIAGNOSIS — I47.29 PAROXYSMAL VENTRICULAR TACHYCARDIA (H): ICD-10-CM

## 2022-11-01 DIAGNOSIS — E66.01 MORBID OBESITY (H): ICD-10-CM

## 2022-11-01 DIAGNOSIS — G47.33 OSA (OBSTRUCTIVE SLEEP APNEA): ICD-10-CM

## 2022-11-01 DIAGNOSIS — I50.22 CHRONIC HFREF (HEART FAILURE WITH REDUCED EJECTION FRACTION) (H): ICD-10-CM

## 2022-11-01 DIAGNOSIS — M15.0 PRIMARY OSTEOARTHRITIS INVOLVING MULTIPLE JOINTS: ICD-10-CM

## 2022-11-01 DIAGNOSIS — T78.3XXS ANGIOEDEMA, SEQUELA: ICD-10-CM

## 2022-11-01 DIAGNOSIS — I10 PRIMARY HYPERTENSION: ICD-10-CM

## 2022-11-01 PROBLEM — I25.5 ISCHEMIC CARDIOMYOPATHY: Status: RESOLVED | Noted: 2020-02-10 | Resolved: 2022-11-01

## 2022-11-01 PROBLEM — E66.9 OBESITY: Status: RESOLVED | Noted: 2022-11-01 | Resolved: 2022-11-01

## 2022-11-01 PROBLEM — I50.20 SYSTOLIC HEART FAILURE (H): Status: RESOLVED | Noted: 2022-11-01 | Resolved: 2022-11-01

## 2022-11-01 PROBLEM — E66.9 OBESITY: Status: ACTIVE | Noted: 2022-11-01

## 2022-11-01 PROBLEM — I50.42 CHRONIC COMBINED SYSTOLIC AND DIASTOLIC HEART FAILURE (H): Status: RESOLVED | Noted: 2020-11-13 | Resolved: 2022-11-01

## 2022-11-01 PROBLEM — M19.90 OA (OSTEOARTHRITIS): Status: ACTIVE | Noted: 2022-11-01

## 2022-11-01 PROBLEM — I50.20 SYSTOLIC HEART FAILURE (H): Status: ACTIVE | Noted: 2022-11-01

## 2022-11-01 LAB
ALBUMIN SERPL BCG-MCNC: 4.3 G/DL (ref 3.5–5.2)
ALBUMIN UR-MCNC: ABNORMAL MG/DL
ALP SERPL-CCNC: 50 U/L (ref 40–129)
ALT SERPL W P-5'-P-CCNC: 17 U/L (ref 10–50)
ANION GAP SERPL CALCULATED.3IONS-SCNC: 10 MMOL/L (ref 7–15)
APPEARANCE UR: CLEAR
AST SERPL W P-5'-P-CCNC: 27 U/L (ref 10–50)
BACTERIA #/AREA URNS HPF: ABNORMAL /HPF
BILIRUB SERPL-MCNC: 1.9 MG/DL
BILIRUB UR QL STRIP: NEGATIVE
BUN SERPL-MCNC: 18.7 MG/DL (ref 8–23)
CALCIUM SERPL-MCNC: 9.2 MG/DL (ref 8.8–10.2)
CHLORIDE SERPL-SCNC: 102 MMOL/L (ref 98–107)
CHOLEST SERPL-MCNC: 130 MG/DL
COLOR UR AUTO: YELLOW
CREAT SERPL-MCNC: 0.92 MG/DL (ref 0.67–1.17)
DEPRECATED HCO3 PLAS-SCNC: 25 MMOL/L (ref 22–29)
ERYTHROCYTE [DISTWIDTH] IN BLOOD BY AUTOMATED COUNT: 14.3 % (ref 10–15)
GFR SERPL CREATININE-BSD FRML MDRD: 82 ML/MIN/1.73M2
GLUCOSE SERPL-MCNC: 99 MG/DL (ref 70–99)
GLUCOSE UR STRIP-MCNC: NEGATIVE MG/DL
HCT VFR BLD AUTO: 45.8 % (ref 40–53)
HDLC SERPL-MCNC: 34 MG/DL
HGB BLD-MCNC: 15.3 G/DL (ref 13.3–17.7)
HGB UR QL STRIP: ABNORMAL
KETONES UR STRIP-MCNC: NEGATIVE MG/DL
LDLC SERPL CALC-MCNC: 75 MG/DL
LEUKOCYTE ESTERASE UR QL STRIP: NEGATIVE
MAGNESIUM SERPL-MCNC: 2 MG/DL (ref 1.7–2.3)
MCH RBC QN AUTO: 31.1 PG (ref 26.5–33)
MCHC RBC AUTO-ENTMCNC: 33.4 G/DL (ref 31.5–36.5)
MCV RBC AUTO: 93 FL (ref 78–100)
MUCOUS THREADS #/AREA URNS LPF: PRESENT /LPF
NITRATE UR QL: NEGATIVE
NONHDLC SERPL-MCNC: 96 MG/DL
PH UR STRIP: 5.5 [PH] (ref 5–8)
PLATELET # BLD AUTO: 142 10E3/UL (ref 150–450)
POTASSIUM SERPL-SCNC: 4.1 MMOL/L (ref 3.4–5.3)
PROT SERPL-MCNC: 6.6 G/DL (ref 6.4–8.3)
RBC # BLD AUTO: 4.92 10E6/UL (ref 4.4–5.9)
RBC #/AREA URNS AUTO: ABNORMAL /HPF
SODIUM SERPL-SCNC: 137 MMOL/L (ref 136–145)
SP GR UR STRIP: >=1.03 (ref 1–1.03)
SQUAMOUS #/AREA URNS AUTO: ABNORMAL /LPF
TRIGL SERPL-MCNC: 103 MG/DL
UROBILINOGEN UR STRIP-ACNC: 0.2 E.U./DL
VIT B12 SERPL-MCNC: 414 PG/ML (ref 232–1245)
WBC # BLD AUTO: 6.7 10E3/UL (ref 4–11)
WBC #/AREA URNS AUTO: ABNORMAL /HPF

## 2022-11-01 PROCEDURE — 99214 OFFICE O/P EST MOD 30 MIN: CPT | Mod: 25 | Performed by: INTERNAL MEDICINE

## 2022-11-01 PROCEDURE — G0439 PPPS, SUBSEQ VISIT: HCPCS | Performed by: INTERNAL MEDICINE

## 2022-11-01 PROCEDURE — 83735 ASSAY OF MAGNESIUM: CPT | Performed by: INTERNAL MEDICINE

## 2022-11-01 PROCEDURE — 90662 IIV NO PRSV INCREASED AG IM: CPT | Performed by: INTERNAL MEDICINE

## 2022-11-01 PROCEDURE — 80053 COMPREHEN METABOLIC PANEL: CPT | Performed by: INTERNAL MEDICINE

## 2022-11-01 PROCEDURE — G0008 ADMIN INFLUENZA VIRUS VAC: HCPCS | Performed by: INTERNAL MEDICINE

## 2022-11-01 PROCEDURE — 36415 COLL VENOUS BLD VENIPUNCTURE: CPT | Performed by: INTERNAL MEDICINE

## 2022-11-01 PROCEDURE — 82607 VITAMIN B-12: CPT | Performed by: INTERNAL MEDICINE

## 2022-11-01 PROCEDURE — 80061 LIPID PANEL: CPT | Performed by: INTERNAL MEDICINE

## 2022-11-01 PROCEDURE — 85027 COMPLETE CBC AUTOMATED: CPT | Performed by: INTERNAL MEDICINE

## 2022-11-01 PROCEDURE — 81001 URINALYSIS AUTO W/SCOPE: CPT | Performed by: INTERNAL MEDICINE

## 2022-11-01 ASSESSMENT — ENCOUNTER SYMPTOMS
DIZZINESS: 0
HEMATOCHEZIA: 0
COUGH: 0
WEAKNESS: 0
FEVER: 0
SORE THROAT: 0
NERVOUS/ANXIOUS: 0
DYSURIA: 0
NAUSEA: 0
CONSTIPATION: 0
PALPITATIONS: 0
CHILLS: 0
SHORTNESS OF BREATH: 0
FREQUENCY: 0
HEADACHES: 0
EYE PAIN: 0
DIARRHEA: 0
HEARTBURN: 0
PARESTHESIAS: 0
HEMATURIA: 0
ABDOMINAL PAIN: 0

## 2022-11-01 ASSESSMENT — ACTIVITIES OF DAILY LIVING (ADL): CURRENT_FUNCTION: NO ASSISTANCE NEEDED

## 2022-11-01 ASSESSMENT — PAIN SCALES - GENERAL: PAINLEVEL: NO PAIN (0)

## 2022-11-01 NOTE — PROGRESS NOTES
"SUBJECTIVE:   Chaim is a 82 year old who presents for Preventive Visit.  This is an 82-year-old man who comes in for annual wellness visit and follow-up of numerous medical issues.  Overall he is doing well.  He had an episode of syncope related to ventricular tachycardia and now has an ICD.  He has done well with no recurrence.  He has a history of recurrent angioedema including after his first COVID vaccination but has done well with subsequent COVID vaccinations and avoidance of NSAIDs including aspirin as well as daily antihistamine seems to have prevented any significant recurrence.  He does have known congestive heart failure and appears euvolemic.  Lots of osteoarthritis with is stable.    Patient has been advised of split billing requirements and indicates understanding: Yes  Are you in the first 12 months of your Medicare coverage?  No    Healthy Habits:     In general, how would you rate your overall health?  Good    Frequency of exercise:  1 day/week    Duration of exercise:  Less than 15 minutes    Do you usually eat at least 4 servings of fruit and vegetables a day, include whole grains    & fiber and avoid regularly eating high fat or \"junk\" foods?  No    Taking medications regularly:  Yes    Ability to successfully perform activities of daily living:  No assistance needed    Home Safety:  No safety concerns identified    Hearing Impairment:  Difficult to understand a speaker at a public meeting or Muslim service, need to ask people to speak up or repeat themselves and find that men's voices are easier to understand than woman's    In the past 6 months, have you been bothered by leaking of urine?  No    In general, how would you rate your overall mental or emotional health?  Good      PHQ-2 Total Score: 0    Additional concerns today:  No  Imm/Inj  Pertinent negatives include no abdominal pain, chest pain, chills, congestion, coughing, fever, headaches, nausea, rash, sore throat or weakness.     Do " you feel safe in your environment? Yes    Have you ever done Advance Care Planning? (For example, a Health Directive, POLST, or a discussion with a medical provider or your loved ones about your wishes): Yes, advance care planning is on file.       Fall risk  Fallen 2 or more times in the past year?: No  Any fall with injury in the past year?: No    Cognitive Screening   1) Repeat 3 items (Leader, Season, Table)    2) Clock draw: NORMAL  3) 3 item recall: Recalls 3 objects  Results: 3 items recalled: COGNITIVE IMPAIRMENT LESS LIKELY    Mini-CogTM Copyright S Jose. Licensed by the author for use in [facility]; reprinted with permission (soob@[facility]). All rights reserved.      Do you have sleep apnea, excessive snoring or daytime drowsiness?: no    Reviewed and updated as needed this visit by clinical staff   Tobacco  Allergies  Meds              Reviewed and updated as needed this visit by Provider                 Social History     Tobacco Use     Smoking status: Former     Smokeless tobacco: Never   Substance Use Topics     Alcohol use: Yes     Alcohol/week: 1.0 standard drink     Comment: Alcoholic Drinks/day: rare - only in [place]       Alcohol Use 11/1/2022   Prescreen: >3 drinks/day or >7 drinks/week? No       Current providers sharing in care for this patient include:   Patient Care Team:  Luis Elena MD as PCP - General  Luis Elena MD as Assigned PCP  Jose Dneg MD as Assigned Heart and Vascular Provider    The following health maintenance items are reviewed in Epic and correct as of today:  Health Maintenance   Topic Date Due     ANNUAL REVIEW OF HM ORDERS  Never done     HEPATITIS B IMMUNIZATION (1 of 3 - 3-dose series) Never done     ZOSTER IMMUNIZATION (2 of 2) 12/01/2016     MEDICARE ANNUAL WELLNESS VISIT  04/05/2022     COVID-19 Vaccine (5 - Booster for Pfizer series) 06/28/2022     INFLUENZA VACCINE (1) 09/01/2022     FALL RISK ASSESSMENT  11/01/2023     ADVANCE  "CARE PLANNING  06/23/2025     DTAP/TDAP/TD IMMUNIZATION (2 - Td or Tdap) 10/06/2026     PHQ-2 (once per calendar year)  Completed     Pneumococcal Vaccine: 65+ Years  Completed     IPV IMMUNIZATION  Aged Out     MENINGITIS IMMUNIZATION  Aged Out         Review of Systems   Constitutional: Negative for chills and fever.   HENT: Positive for hearing loss. Negative for congestion, ear pain and sore throat.    Eyes: Negative for pain and visual disturbance.   Respiratory: Negative for cough and shortness of breath.    Cardiovascular: Negative for chest pain and palpitations.   Gastrointestinal: Negative for abdominal pain, constipation, diarrhea, heartburn, hematochezia and nausea.   Genitourinary: Negative for dysuria, frequency, genital sores, hematuria, impotence, penile discharge and urgency.   Skin: Negative for rash.   Neurological: Negative for dizziness, weakness, headaches and paresthesias.   Psychiatric/Behavioral: Negative for mood changes. The patient is not nervous/anxious.        OBJECTIVE:   /72 (BP Location: Left arm, Patient Position: Sitting, Cuff Size: Adult Large)   Pulse 68   Temp 96.8  F (36  C) (Tympanic)   Resp 18   Ht 1.778 m (5' 10\")   Wt 126.1 kg (277 lb 14.4 oz)   SpO2 94%   BMI 39.87 kg/m   Estimated body mass index is 39.87 kg/m  as calculated from the following:    Height as of this encounter: 1.778 m (5' 10\").    Weight as of this encounter: 126.1 kg (277 lb 14.4 oz).  Physical Exam  EYES: Eyelids, conjunctiva, and sclera were normal. Pupils were normal. Cornea, iris, and lens were normal bilaterally.  HEAD, EARS, NOSE, MOUTH, AND THROAT: Head and face were normal. Hearing was normal to voice and the ears were normal to external exam.  NECK: Neck appearance was normal. There were no neck masses and the thyroid was not enlarged.  RESPIRATORY: Breathing pattern was normal and the chest moved symmetrically.  Percussion/auscultatory percussion was normal.  Lung sounds were normal " and there were no abnormal sounds.  CARDIOVASCULAR: Heart rate and rhythm were normal.  S1 and S2 were normal and there were no extra sounds or murmurs. Peripheral pulses in arms and legs were normal.  Jugular venous pressure was normal.  There was no peripheral edema.  GASTROINTESTINAL: The abdomen was normal in contour.  Bowel sounds were present.  Percussion detected no organ enlargement or tenderness.  Palpation detected no tenderness, mass, or enlarged organs.   MUSCULOSKELETAL: Skeletal configuration was normal and muscle mass was normal for age. Joint appearance was overall normal.  LYMPHATIC: There were no enlarged nodes.  SKIN/HAIR/NAILS: Skin color was normal.  There were no skin lesions.  Hair and nails were normal.  NEUROLOGIC: The patient was alert and oriented to person, place, time, and circumstance. Speech was normal. Cranial nerves were normal. Motor strength was normal for age. The patient was normally coordinated.  PSYCHIATRIC:  Mood and affect were normal and the patient had normal recent and remote memory. The patient's judgment and insight were normal.      ASSESSMENT / PLAN:   1. Annual physical exam  This is an 82-year-old man with issues as discussed below.  Ongoing healthy lifestyle discussed and recommended    2. Chronic HFrEF (heart failure with reduced ejection fraction) (H)  Appears euvolemic continue    3. Paroxysmal ventricular tachycardia  Now has ICD, no recurrent  - Magnesium; Future  - Magnesium    4. ICD (implantable cardioverter-defibrillator), single, in situ    5. Chronic coronary artery disease  Continue secondary prevention    6. Primary hypertension  Blood pressure controlled continue same    7. Hyperlipidemia with target low density lipoprotein (LDL) cholesterol less than 70 mg/dL  Statin  - Vitamin B12; Future  - CBC with platelets; Future  - Comprehensive metabolic panel; Future  - Lipid panel reflex to direct LDL Fasting; Future  - UA reflex to Microscopic and Culture;  "Future  - Vitamin B12  - CBC with platelets  - Comprehensive metabolic panel  - Lipid panel reflex to direct LDL Fasting  - UA reflex to Microscopic and Culture  - Urine Microscopic    8. Angioedema, sequela  Continue current management    9. FLO - did not tolerate CPAP    10. Gastroesophageal reflux disease without esophagitis  Stable on H2 blocker    11. Primary osteoarthritis involving multiple joints  Stable    12. Morbid obesity (H)  COUNSELING:  Reviewed preventive health counseling, as reflected in patient instructions    Estimated body mass index is 39.87 kg/m  as calculated from the following:    Height as of this encounter: 1.778 m (5' 10\").    Weight as of this encounter: 126.1 kg (277 lb 14.4 oz).    Weight management plan: Discussed healthy diet and exercise guidelines    He reports that he has quit smoking. He has never used smokeless tobacco.      Appropriate preventive services were discussed with this patient, including applicable screening as appropriate for cardiovascular disease, diabetes, osteopenia/osteoporosis, and glaucoma.  As appropriate for age/gender, discussed screening for colorectal cancer, prostate cancer, breast cancer, and cervical cancer. Checklist reviewing preventive services available has been given to the patient.    Reviewed patients plan of care and provided an AVS. The Basic Care Plan (routine screening as documented in Health Maintenance) for Chaim meets the Care Plan requirement. This Care Plan has been established and reviewed with the Patient.    Counseling Resources:  ATP IV Guidelines  Pooled Cohorts Equation Calculator  Breast Cancer Risk Calculator  Breast Cancer: Medication to Reduce Risk  FRAX Risk Assessment  ICSI Preventive Guidelines  Dietary Guidelines for Americans, 2010  USDA's MyPlate  ASA Prophylaxis  Lung CA Screening    Luis Elena MD  [facility]    Identified Health Risks:  "

## 2022-11-10 ENCOUNTER — ALLIED HEALTH/NURSE VISIT (OUTPATIENT)
Dept: FAMILY MEDICINE | Facility: CLINIC | Age: 82
End: 2022-11-10
Payer: MEDICARE

## 2022-11-10 DIAGNOSIS — Z23 ENCOUNTER FOR IMMUNIZATION: Primary | ICD-10-CM

## 2022-11-10 PROCEDURE — 91312 COVID-19,PF,PFIZER BOOSTER BIVALENT: CPT

## 2022-11-10 PROCEDURE — 0124A COVID-19,PF,PFIZER BOOSTER BIVALENT: CPT

## 2022-11-10 PROCEDURE — 99207 PR NO CHARGE NURSE ONLY: CPT

## 2022-11-25 LAB
MDC_IDC_LEAD_IMPLANT_DT: NORMAL
MDC_IDC_LEAD_LOCATION: NORMAL
MDC_IDC_LEAD_LOCATION_DETAIL_1: NORMAL
MDC_IDC_LEAD_MFG: NORMAL
MDC_IDC_LEAD_MODEL: NORMAL
MDC_IDC_LEAD_POLARITY_TYPE: NORMAL
MDC_IDC_LEAD_SERIAL: NORMAL
MDC_IDC_LEAD_SPECIAL_FUNCTION: NORMAL
MDC_IDC_MSMT_BATTERY_DTM: NORMAL
MDC_IDC_MSMT_BATTERY_REMAINING_LONGEVITY: 55 MO
MDC_IDC_MSMT_BATTERY_RRT_TRIGGER: 2.73
MDC_IDC_MSMT_BATTERY_STATUS: NORMAL
MDC_IDC_MSMT_BATTERY_VOLTAGE: 3 V
MDC_IDC_MSMT_CAP_CHARGE_DTM: NORMAL
MDC_IDC_MSMT_CAP_CHARGE_ENERGY: 18 J
MDC_IDC_MSMT_CAP_CHARGE_TIME: 3.98
MDC_IDC_MSMT_CAP_CHARGE_TYPE: NORMAL
MDC_IDC_MSMT_LEADCHNL_RV_IMPEDANCE_VALUE: 266 OHM
MDC_IDC_MSMT_LEADCHNL_RV_IMPEDANCE_VALUE: 323 OHM
MDC_IDC_MSMT_LEADCHNL_RV_PACING_THRESHOLD_AMPLITUDE: 0.62 V
MDC_IDC_MSMT_LEADCHNL_RV_PACING_THRESHOLD_PULSEWIDTH: 0.4 MS
MDC_IDC_MSMT_LEADCHNL_RV_SENSING_INTR_AMPL: 3.88 MV
MDC_IDC_MSMT_LEADCHNL_RV_SENSING_INTR_AMPL: 3.88 MV
MDC_IDC_PG_IMPLANT_DTM: NORMAL
MDC_IDC_PG_MFG: NORMAL
MDC_IDC_PG_MODEL: NORMAL
MDC_IDC_PG_SERIAL: NORMAL
MDC_IDC_PG_TYPE: NORMAL
MDC_IDC_SESS_CLINIC_NAME: NORMAL
MDC_IDC_SESS_DTM: NORMAL
MDC_IDC_SESS_TYPE: NORMAL
MDC_IDC_SET_BRADY_HYSTRATE: NORMAL
MDC_IDC_SET_BRADY_LOWRATE: 40 {BEATS}/MIN
MDC_IDC_SET_BRADY_MODE: NORMAL
MDC_IDC_SET_LEADCHNL_RV_PACING_AMPLITUDE: 1.5 V
MDC_IDC_SET_LEADCHNL_RV_PACING_ANODE_ELECTRODE_1: NORMAL
MDC_IDC_SET_LEADCHNL_RV_PACING_ANODE_LOCATION_1: NORMAL
MDC_IDC_SET_LEADCHNL_RV_PACING_CAPTURE_MODE: NORMAL
MDC_IDC_SET_LEADCHNL_RV_PACING_CATHODE_ELECTRODE_1: NORMAL
MDC_IDC_SET_LEADCHNL_RV_PACING_CATHODE_LOCATION_1: NORMAL
MDC_IDC_SET_LEADCHNL_RV_PACING_POLARITY: NORMAL
MDC_IDC_SET_LEADCHNL_RV_PACING_PULSEWIDTH: 0.4 MS
MDC_IDC_SET_LEADCHNL_RV_SENSING_ANODE_ELECTRODE_1: NORMAL
MDC_IDC_SET_LEADCHNL_RV_SENSING_ANODE_LOCATION_1: NORMAL
MDC_IDC_SET_LEADCHNL_RV_SENSING_CATHODE_ELECTRODE_1: NORMAL
MDC_IDC_SET_LEADCHNL_RV_SENSING_CATHODE_LOCATION_1: NORMAL
MDC_IDC_SET_LEADCHNL_RV_SENSING_POLARITY: NORMAL
MDC_IDC_SET_LEADCHNL_RV_SENSING_SENSITIVITY: 0.3 MV
MDC_IDC_SET_ZONE_DETECTION_BEATS_DENOMINATOR: 32 {BEATS}
MDC_IDC_SET_ZONE_DETECTION_BEATS_NUMERATOR: 24 {BEATS}
MDC_IDC_SET_ZONE_DETECTION_INTERVAL: 240 MS
MDC_IDC_SET_ZONE_DETECTION_INTERVAL: 320 MS
MDC_IDC_SET_ZONE_DETECTION_INTERVAL: 360 MS
MDC_IDC_SET_ZONE_DETECTION_INTERVAL: 400 MS
MDC_IDC_SET_ZONE_TYPE: NORMAL
MDC_IDC_STAT_BRADY_DTM_END: NORMAL
MDC_IDC_STAT_BRADY_DTM_START: NORMAL
MDC_IDC_STAT_BRADY_RV_PERCENT_PACED: 0.17 %
MDC_IDC_STAT_EPISODE_RECENT_COUNT: 0
MDC_IDC_STAT_EPISODE_RECENT_COUNT_DTM_END: NORMAL
MDC_IDC_STAT_EPISODE_RECENT_COUNT_DTM_START: NORMAL
MDC_IDC_STAT_EPISODE_TOTAL_COUNT: 0
MDC_IDC_STAT_EPISODE_TOTAL_COUNT: 1
MDC_IDC_STAT_EPISODE_TOTAL_COUNT: 3
MDC_IDC_STAT_EPISODE_TOTAL_COUNT: 92
MDC_IDC_STAT_EPISODE_TOTAL_COUNT_DTM_END: NORMAL
MDC_IDC_STAT_EPISODE_TOTAL_COUNT_DTM_START: NORMAL
MDC_IDC_STAT_EPISODE_TYPE: NORMAL
MDC_IDC_STAT_TACHYTHERAPY_ATP_DELIVERED_RECENT: 0
MDC_IDC_STAT_TACHYTHERAPY_ATP_DELIVERED_TOTAL: 0
MDC_IDC_STAT_TACHYTHERAPY_RECENT_DTM_END: NORMAL
MDC_IDC_STAT_TACHYTHERAPY_RECENT_DTM_START: NORMAL
MDC_IDC_STAT_TACHYTHERAPY_SHOCKS_ABORTED_RECENT: 0
MDC_IDC_STAT_TACHYTHERAPY_SHOCKS_ABORTED_TOTAL: 0
MDC_IDC_STAT_TACHYTHERAPY_SHOCKS_DELIVERED_RECENT: 0
MDC_IDC_STAT_TACHYTHERAPY_SHOCKS_DELIVERED_TOTAL: 1
MDC_IDC_STAT_TACHYTHERAPY_TOTAL_DTM_END: NORMAL
MDC_IDC_STAT_TACHYTHERAPY_TOTAL_DTM_START: NORMAL

## 2023-01-12 DIAGNOSIS — I25.10 CHRONIC CORONARY ARTERY DISEASE: ICD-10-CM

## 2023-01-13 RX ORDER — CLOPIDOGREL BISULFATE 75 MG/1
75 TABLET ORAL DAILY
Qty: 90 TABLET | Refills: 1 | Status: SHIPPED | OUTPATIENT
Start: 2023-01-13 | End: 2023-07-07

## 2023-01-13 NOTE — TELEPHONE ENCOUNTER
"Routing refill request to provider for review/approval because:  Labs out of range:  Platelets    Last Written Prescription Date:  1/4/22  Last Fill Quantity: 90,  # refills: 3   Last office visit provider:  11/1/22     Requested Prescriptions   Pending Prescriptions Disp Refills     clopidogrel (PLAVIX) 75 MG tablet [Pharmacy Med Name: CLOPIDOGREL 75MG TABLETS] 90 tablet 3     Sig: TAKE 1 TABLET(75 MG) BY MOUTH DAILY       Plavix Failed - 1/13/2023  2:02 PM        Failed - Normal Platelets on file in past 12 months     Recent Labs   Lab Test 11/01/22  0935   *               Passed - No active PPI on record unless is Protonix        Passed - Normal HGB on file in past 12 months     Recent Labs   Lab Test 11/01/22  0935   HGB 15.3               Passed - Recent (12 mo) or future (30 days) visit within the authorizing provider's specialty     Patient has had an office visit with the authorizing provider or a provider within the authorizing providers department within the previous 12 mos or has a future within next 30 days. See \"Patient Info\" tab in inbasket, or \"Choose Columns\" in Meds & Orders section of the refill encounter.              Passed - Medication is active on med list        Passed - Patient is age 18 or older             Sebastián Hartman RN 01/13/23 2:02 PM  "

## 2023-01-31 DIAGNOSIS — Z95.810 ICD (IMPLANTABLE CARDIOVERTER-DEFIBRILLATOR) IN PLACE: Primary | ICD-10-CM

## 2023-01-31 DIAGNOSIS — I25.5 ISCHEMIC CARDIOMYOPATHY: ICD-10-CM

## 2023-02-20 DIAGNOSIS — Z95.810 ICD (IMPLANTABLE CARDIOVERTER-DEFIBRILLATOR) IN PLACE: Primary | ICD-10-CM

## 2023-02-20 DIAGNOSIS — I25.5 ISCHEMIC CARDIOMYOPATHY: ICD-10-CM

## 2023-04-06 ENCOUNTER — ANCILLARY PROCEDURE (OUTPATIENT)
Dept: CARDIOLOGY | Facility: CLINIC | Age: 83
End: 2023-04-06
Attending: INTERNAL MEDICINE
Payer: MEDICARE

## 2023-04-06 DIAGNOSIS — I25.5 ISCHEMIC CARDIOMYOPATHY: ICD-10-CM

## 2023-04-06 DIAGNOSIS — Z95.810 ICD (IMPLANTABLE CARDIOVERTER-DEFIBRILLATOR) IN PLACE: ICD-10-CM

## 2023-04-06 LAB
MDC_IDC_EPISODE_DTM: NORMAL
MDC_IDC_EPISODE_DURATION: 1 S
MDC_IDC_EPISODE_ID: 99
MDC_IDC_EPISODE_TYPE: NORMAL
MDC_IDC_LEAD_IMPLANT_DT: NORMAL
MDC_IDC_LEAD_LOCATION: NORMAL
MDC_IDC_LEAD_LOCATION_DETAIL_1: NORMAL
MDC_IDC_LEAD_MFG: NORMAL
MDC_IDC_LEAD_MODEL: NORMAL
MDC_IDC_LEAD_POLARITY_TYPE: NORMAL
MDC_IDC_LEAD_SERIAL: NORMAL
MDC_IDC_LEAD_SPECIAL_FUNCTION: NORMAL
MDC_IDC_MSMT_BATTERY_DTM: NORMAL
MDC_IDC_MSMT_BATTERY_REMAINING_LONGEVITY: 50 MO
MDC_IDC_MSMT_BATTERY_RRT_TRIGGER: 2.73
MDC_IDC_MSMT_BATTERY_STATUS: NORMAL
MDC_IDC_MSMT_BATTERY_VOLTAGE: 2.98 V
MDC_IDC_MSMT_CAP_CHARGE_DTM: NORMAL
MDC_IDC_MSMT_CAP_CHARGE_ENERGY: 18 J
MDC_IDC_MSMT_CAP_CHARGE_TIME: 4.04
MDC_IDC_MSMT_CAP_CHARGE_TYPE: NORMAL
MDC_IDC_MSMT_LEADCHNL_RV_IMPEDANCE_VALUE: 266 OHM
MDC_IDC_MSMT_LEADCHNL_RV_IMPEDANCE_VALUE: 342 OHM
MDC_IDC_MSMT_LEADCHNL_RV_PACING_THRESHOLD_AMPLITUDE: 0.75 V
MDC_IDC_MSMT_LEADCHNL_RV_PACING_THRESHOLD_PULSEWIDTH: 0.4 MS
MDC_IDC_MSMT_LEADCHNL_RV_SENSING_INTR_AMPL: 4.75 MV
MDC_IDC_MSMT_LEADCHNL_RV_SENSING_INTR_AMPL: 4.75 MV
MDC_IDC_PG_IMPLANT_DTM: NORMAL
MDC_IDC_PG_MFG: NORMAL
MDC_IDC_PG_MODEL: NORMAL
MDC_IDC_PG_SERIAL: NORMAL
MDC_IDC_PG_TYPE: NORMAL
MDC_IDC_SESS_CLINIC_NAME: NORMAL
MDC_IDC_SESS_DTM: NORMAL
MDC_IDC_SESS_TYPE: NORMAL
MDC_IDC_SET_BRADY_HYSTRATE: NORMAL
MDC_IDC_SET_BRADY_LOWRATE: 40 {BEATS}/MIN
MDC_IDC_SET_BRADY_MODE: NORMAL
MDC_IDC_SET_LEADCHNL_RV_PACING_AMPLITUDE: 1.5 V
MDC_IDC_SET_LEADCHNL_RV_PACING_ANODE_ELECTRODE_1: NORMAL
MDC_IDC_SET_LEADCHNL_RV_PACING_ANODE_LOCATION_1: NORMAL
MDC_IDC_SET_LEADCHNL_RV_PACING_CAPTURE_MODE: NORMAL
MDC_IDC_SET_LEADCHNL_RV_PACING_CATHODE_ELECTRODE_1: NORMAL
MDC_IDC_SET_LEADCHNL_RV_PACING_CATHODE_LOCATION_1: NORMAL
MDC_IDC_SET_LEADCHNL_RV_PACING_POLARITY: NORMAL
MDC_IDC_SET_LEADCHNL_RV_PACING_PULSEWIDTH: 0.4 MS
MDC_IDC_SET_LEADCHNL_RV_SENSING_ANODE_ELECTRODE_1: NORMAL
MDC_IDC_SET_LEADCHNL_RV_SENSING_ANODE_LOCATION_1: NORMAL
MDC_IDC_SET_LEADCHNL_RV_SENSING_CATHODE_ELECTRODE_1: NORMAL
MDC_IDC_SET_LEADCHNL_RV_SENSING_CATHODE_LOCATION_1: NORMAL
MDC_IDC_SET_LEADCHNL_RV_SENSING_POLARITY: NORMAL
MDC_IDC_SET_LEADCHNL_RV_SENSING_SENSITIVITY: 0.3 MV
MDC_IDC_SET_ZONE_DETECTION_BEATS_DENOMINATOR: 32 {BEATS}
MDC_IDC_SET_ZONE_DETECTION_BEATS_NUMERATOR: 24 {BEATS}
MDC_IDC_SET_ZONE_DETECTION_INTERVAL: 240 MS
MDC_IDC_SET_ZONE_DETECTION_INTERVAL: 320 MS
MDC_IDC_SET_ZONE_DETECTION_INTERVAL: 360 MS
MDC_IDC_SET_ZONE_DETECTION_INTERVAL: 400 MS
MDC_IDC_SET_ZONE_TYPE: NORMAL
MDC_IDC_STAT_BRADY_DTM_END: NORMAL
MDC_IDC_STAT_BRADY_DTM_START: NORMAL
MDC_IDC_STAT_BRADY_RV_PERCENT_PACED: 0.15 %
MDC_IDC_STAT_EPISODE_RECENT_COUNT: 0
MDC_IDC_STAT_EPISODE_RECENT_COUNT: 1
MDC_IDC_STAT_EPISODE_RECENT_COUNT_DTM_END: NORMAL
MDC_IDC_STAT_EPISODE_RECENT_COUNT_DTM_START: NORMAL
MDC_IDC_STAT_EPISODE_TOTAL_COUNT: 0
MDC_IDC_STAT_EPISODE_TOTAL_COUNT: 1
MDC_IDC_STAT_EPISODE_TOTAL_COUNT: 3
MDC_IDC_STAT_EPISODE_TOTAL_COUNT: 93
MDC_IDC_STAT_EPISODE_TOTAL_COUNT_DTM_END: NORMAL
MDC_IDC_STAT_EPISODE_TOTAL_COUNT_DTM_START: NORMAL
MDC_IDC_STAT_EPISODE_TYPE: NORMAL
MDC_IDC_STAT_TACHYTHERAPY_ATP_DELIVERED_RECENT: 0
MDC_IDC_STAT_TACHYTHERAPY_ATP_DELIVERED_TOTAL: 0
MDC_IDC_STAT_TACHYTHERAPY_RECENT_DTM_END: NORMAL
MDC_IDC_STAT_TACHYTHERAPY_RECENT_DTM_START: NORMAL
MDC_IDC_STAT_TACHYTHERAPY_SHOCKS_ABORTED_RECENT: 0
MDC_IDC_STAT_TACHYTHERAPY_SHOCKS_ABORTED_TOTAL: 0
MDC_IDC_STAT_TACHYTHERAPY_SHOCKS_DELIVERED_RECENT: 0
MDC_IDC_STAT_TACHYTHERAPY_SHOCKS_DELIVERED_TOTAL: 1
MDC_IDC_STAT_TACHYTHERAPY_TOTAL_DTM_END: NORMAL
MDC_IDC_STAT_TACHYTHERAPY_TOTAL_DTM_START: NORMAL

## 2023-04-06 PROCEDURE — 93295 DEV INTERROG REMOTE 1/2/MLT: CPT | Performed by: INTERNAL MEDICINE

## 2023-04-06 PROCEDURE — 93296 REM INTERROG EVL PM/IDS: CPT | Performed by: INTERNAL MEDICINE

## 2023-04-08 DIAGNOSIS — E78.2 MIXED HYPERLIPIDEMIA: ICD-10-CM

## 2023-04-08 DIAGNOSIS — I10 ESSENTIAL HYPERTENSION: ICD-10-CM

## 2023-04-09 RX ORDER — SIMVASTATIN 40 MG
TABLET ORAL
Qty: 90 TABLET | Refills: 1 | Status: SHIPPED | OUTPATIENT
Start: 2023-04-09 | End: 2023-10-05

## 2023-04-09 RX ORDER — HYDROCHLOROTHIAZIDE 25 MG/1
TABLET ORAL
Qty: 90 TABLET | Refills: 1 | Status: SHIPPED | OUTPATIENT
Start: 2023-04-09 | End: 2023-10-05

## 2023-04-09 NOTE — TELEPHONE ENCOUNTER
"Last Written Prescription Date:  5/3/22  Last Fill Quantity: 90,  # refills: 3   Last office visit provider:  11/1/22     Requested Prescriptions   Pending Prescriptions Disp Refills     simvastatin (ZOCOR) 40 MG tablet [Pharmacy Med Name: SIMVASTATIN 40MG TABLETS] 90 tablet 3     Sig: TAKE 1 TABLET(40 MG) BY MOUTH AT BEDTIME       Statins Protocol Passed - 4/8/2023  4:21 AM        Passed - LDL on file in past 12 months     Recent Labs   Lab Test 11/01/22  0935   LDL 75             Passed - No abnormal creatine kinase in past 12 months     No lab results found.             Passed - Recent (12 mo) or future (30 days) visit within the authorizing provider's specialty     Patient has had an office visit with the authorizing provider or a provider within the authorizing providers department within the previous 12 mos or has a future within next 30 days. See \"Patient Info\" tab in inbasket, or \"Choose Columns\" in Meds & Orders section of the refill encounter.              Passed - Medication is active on med list        Passed - Patient is age 18 or older           hydrochlorothiazide (HYDRODIURIL) 25 MG tablet [Pharmacy Med Name: HYDROCHLOROTHIAZIDE 25MGTABLETS] 90 tablet 3     Sig: TAKE 1 TABLET(25 MG) BY MOUTH DAILY       Diuretics (Including Combos) Protocol Passed - 4/8/2023  4:21 AM        Passed - Blood pressure under 140/90 in past 12 months     BP Readings from Last 3 Encounters:   11/01/22 126/72   05/03/22 118/66   01/27/22 128/74                 Passed - Recent (12 mo) or future (30 days) visit within the authorizing provider's specialty     Patient has had an office visit with the authorizing provider or a provider within the authorizing providers department within the previous 12 mos or has a future within next 30 days. See \"Patient Info\" tab in inbasket, or \"Choose Columns\" in Meds & Orders section of the refill encounter.              Passed - Medication is active on med list        Passed - Patient is age " 18 or older        Passed - Normal serum creatinine on file in past 12 months     Recent Labs   Lab Test 11/01/22  0935   CR 0.92              Passed - Normal serum potassium on file in past 12 months     Recent Labs   Lab Test 11/01/22  0935   POTASSIUM 4.1                    Passed - Normal serum sodium on file in past 12 months     Recent Labs   Lab Test 11/01/22  0935                      Gracie Rojas 04/09/23 9:26 AM

## 2023-04-10 DIAGNOSIS — I10 ESSENTIAL HYPERTENSION: ICD-10-CM

## 2023-04-11 RX ORDER — METOPROLOL SUCCINATE 100 MG/1
100 TABLET, EXTENDED RELEASE ORAL DAILY
Qty: 90 TABLET | Refills: 1 | Status: SHIPPED | OUTPATIENT
Start: 2023-04-11 | End: 2023-10-12

## 2023-04-11 NOTE — TELEPHONE ENCOUNTER
"Last Written Prescription Date:  5/3/2022  Last Fill Quantity: 90,  # refills: 3   Last office visit provider:  11/1/2022     Requested Prescriptions   Pending Prescriptions Disp Refills     metoprolol succinate ER (TOPROL XL) 100 MG 24 hr tablet 90 tablet 3     Sig: Take 1 tablet (100 mg) by mouth daily       Beta-Blockers Protocol Passed - 4/10/2023 12:11 PM        Passed - Blood pressure under 140/90 in past 12 months     BP Readings from Last 3 Encounters:   11/01/22 126/72   05/03/22 118/66   01/27/22 128/74                 Passed - Patient is age 6 or older        Passed - Recent (12 mo) or future (30 days) visit within the authorizing provider's specialty     Patient has had an office visit with the authorizing provider or a provider within the authorizing providers department within the previous 12 mos or has a future within next 30 days. See \"Patient Info\" tab in inbasket, or \"Choose Columns\" in Meds & Orders section of the refill encounter.              Passed - Medication is active on med list             Elda Wooten RN 04/11/23 11:55 AM  "

## 2023-05-01 ENCOUNTER — OFFICE VISIT (OUTPATIENT)
Dept: INTERNAL MEDICINE | Facility: CLINIC | Age: 83
End: 2023-05-01
Payer: MEDICARE

## 2023-05-01 VITALS
DIASTOLIC BLOOD PRESSURE: 71 MMHG | HEART RATE: 66 BPM | BODY MASS INDEX: 39.11 KG/M2 | WEIGHT: 279.4 LBS | TEMPERATURE: 96.8 F | OXYGEN SATURATION: 98 % | HEIGHT: 71 IN | RESPIRATION RATE: 16 BRPM | SYSTOLIC BLOOD PRESSURE: 130 MMHG

## 2023-05-01 DIAGNOSIS — D69.6 THROMBOCYTOPENIA (H): ICD-10-CM

## 2023-05-01 DIAGNOSIS — I10 PRIMARY HYPERTENSION: ICD-10-CM

## 2023-05-01 DIAGNOSIS — I25.10 CHRONIC CORONARY ARTERY DISEASE: ICD-10-CM

## 2023-05-01 DIAGNOSIS — E66.01 MORBID OBESITY (H): ICD-10-CM

## 2023-05-01 DIAGNOSIS — I50.22 CHRONIC HFREF (HEART FAILURE WITH REDUCED EJECTION FRACTION) (H): Primary | ICD-10-CM

## 2023-05-01 DIAGNOSIS — T78.3XXS ANGIOEDEMA, SEQUELA: ICD-10-CM

## 2023-05-01 DIAGNOSIS — I47.29 PAROXYSMAL VENTRICULAR TACHYCARDIA (H): ICD-10-CM

## 2023-05-01 LAB
ANION GAP SERPL CALCULATED.3IONS-SCNC: 10 MMOL/L (ref 7–15)
BUN SERPL-MCNC: 18.5 MG/DL (ref 8–23)
CALCIUM SERPL-MCNC: 9.5 MG/DL (ref 8.8–10.2)
CHLORIDE SERPL-SCNC: 102 MMOL/L (ref 98–107)
CREAT SERPL-MCNC: 1.04 MG/DL (ref 0.67–1.17)
DEPRECATED HCO3 PLAS-SCNC: 25 MMOL/L (ref 22–29)
ERYTHROCYTE [DISTWIDTH] IN BLOOD BY AUTOMATED COUNT: 14.6 % (ref 10–15)
GFR SERPL CREATININE-BSD FRML MDRD: 70 ML/MIN/1.73M2
GLUCOSE SERPL-MCNC: 99 MG/DL (ref 70–99)
HCT VFR BLD AUTO: 47 % (ref 40–53)
HGB BLD-MCNC: 15.3 G/DL (ref 13.3–17.7)
MCH RBC QN AUTO: 30.5 PG (ref 26.5–33)
MCHC RBC AUTO-ENTMCNC: 32.6 G/DL (ref 31.5–36.5)
MCV RBC AUTO: 94 FL (ref 78–100)
PLATELET # BLD AUTO: 134 10E3/UL (ref 150–450)
POTASSIUM SERPL-SCNC: 4 MMOL/L (ref 3.4–5.3)
RBC # BLD AUTO: 5.02 10E6/UL (ref 4.4–5.9)
SODIUM SERPL-SCNC: 137 MMOL/L (ref 136–145)
WBC # BLD AUTO: 7.1 10E3/UL (ref 4–11)

## 2023-05-01 PROCEDURE — 80048 BASIC METABOLIC PNL TOTAL CA: CPT | Performed by: INTERNAL MEDICINE

## 2023-05-01 PROCEDURE — 36415 COLL VENOUS BLD VENIPUNCTURE: CPT | Performed by: INTERNAL MEDICINE

## 2023-05-01 PROCEDURE — 85027 COMPLETE CBC AUTOMATED: CPT | Performed by: INTERNAL MEDICINE

## 2023-05-01 PROCEDURE — 99214 OFFICE O/P EST MOD 30 MIN: CPT | Performed by: INTERNAL MEDICINE

## 2023-05-01 ASSESSMENT — PATIENT HEALTH QUESTIONNAIRE - PHQ9
SUM OF ALL RESPONSES TO PHQ QUESTIONS 1-9: 0
10. IF YOU CHECKED OFF ANY PROBLEMS, HOW DIFFICULT HAVE THESE PROBLEMS MADE IT FOR YOU TO DO YOUR WORK, TAKE CARE OF THINGS AT HOME, OR GET ALONG WITH OTHER PEOPLE: NOT DIFFICULT AT ALL
SUM OF ALL RESPONSES TO PHQ QUESTIONS 1-9: 0

## 2023-05-01 NOTE — PROGRESS NOTES
"  Office Visit - Follow Up   Chaim Dee   83 year old male    Date of Visit: 5/1/2023    Chief Complaint   Patient presents with     Recheck Medication     RECHECK     Pt is here for 6 month follow up         Assessment and Plan   1. Chronic HFrEF (heart failure with reduced ejection fraction) (H)  Appears euvolemic, continue same  - CBC with platelets; Future  - Basic metabolic panel; Future  - CBC with platelets  - Basic metabolic panel    2. Paroxysmal ventricular tachycardia (H)  Has an ICD, fired in the recent past and saw Dr. Deng with extensive evaluation which looked okay.  He would like to switch to [facility] as it is closer to his home and other brothers use the same clinic.  He will call them to make an appointment.  I encouraged him to keep his current appointment with [facility].    3. Chronic coronary artery disease  Continue secondary prevention, on Plavix instead of aspirin because of angioedema    4. Primary hypertension  Controlled continue same    5. Angioedema, sequela  Stable continue antihistamines, avoidance of aspirin    6. Thrombocytopenia (H)  Chronic and stable    7. Morbid obesity (H)  The following high BMI interventions were performed this visit: encouragement to exercise and lifestyle education regarding diet    Return in about 6 months (around 11/1/2023) for Routine preventive.     History of Present Illness   This 83 year old man is doing well comes in for follow-up.  He has a follow-up appointment with electrophysiology.  No firing of his ICD recently.  No chest pain or shortness of breath.  Would like to start exercising more now that is nice out.  No recent angioedema.       Physical Exam   General Appearance:   No acute distress    /71 (BP Location: Left arm, Patient Position: Sitting, Cuff Size: Adult Regular)   Pulse 66   Temp 96.8  F (36  C) (Tympanic)   Resp 16   Ht 1.791 m (5' 10.5\")   Wt 126.7 kg (279 lb 6.4 oz)   SpO2 98%   BMI 39.52 kg/m  "     Heart rate controlled rhythm regular lungs clear to auscultation bilaterally, minimal edema in the lower extremities     Additional Information   Current Outpatient Medications   Medication Sig Dispense Refill     acetaminophen (TYLENOL) 325 MG tablet [ACETAMINOPHEN (TYLENOL) 325 MG TABLET] Take 2 tablets (650 mg total) by mouth every 4 (four) hours as needed.  0     clopidogrel (PLAVIX) 75 MG tablet Take 1 tablet (75 mg) by mouth daily 90 tablet 1     diphenhydrAMINE (BENADRYL) 25 MG tablet Take 1-2 tablets (25-50 mg) by mouth nightly as needed (angioedema)       famotidine (PEPCID) 20 MG tablet Take 1 tablet (20 mg) by mouth At Bedtime 180 tablet 3     fexofenadine (ALLEGRA) 180 MG tablet Take 1 tablet (180 mg) by mouth daily 90 tablet 3     hydrochlorothiazide (HYDRODIURIL) 25 MG tablet TAKE 1 TABLET(25 MG) BY MOUTH DAILY 90 tablet 1     metoprolol succinate ER (TOPROL XL) 100 MG 24 hr tablet Take 1 tablet (100 mg) by mouth daily 90 tablet 1     nitroglycerin (NITROSTAT) 0.4 MG SL tablet [NITROGLYCERIN (NITROSTAT) 0.4 MG SL TABLET] Place 1 tablet (0.4 mg total) under the tongue as needed. Use as directed. 30 tablet 0     simvastatin (ZOCOR) 40 MG tablet TAKE 1 TABLET(40 MG) BY MOUTH AT BEDTIME 90 tablet 1     vitamin D3 (CHOLECALCIFEROL) 50 mcg (2000 units) tablet Take 1 tablet (50 mcg) by mouth daily       Allergies   Allergen Reactions     Ace Inhibitors Angioedema     Arb-Angiotensin Receptor Antagonist [Angiotensin Receptor Blockers] Angioedema     Aspirin Unknown     Angioedema - stopped aspirin, angioedema resolved, restarted recurred (fairly strong evidence)     Gabapentin Angioedema     Losartan Angioedema     Ramipril Angioedema     Seafood Nausea and Vomiting     Shellfish Containing Products [Shellfish-Derived Products] Nausea and Vomiting     SHRIMP- worst reaction to.       Time:      Luis Elena MD  Answers for HPI/ROS submitted by the patient on 5/1/2023  If you checked off any problems,  how difficult have these problems made it for you to do your work, take care of things at home, or get along with other people?: Not difficult at all  PHQ9 TOTAL SCORE: 0  What is the reason for your visit today? : 6 month follow up  How many servings of fruits and vegetables do you eat daily?: 2-3  On average, how many sweetened beverages do you drink each day (Examples: soda, juice, sweet tea, etc.  Do NOT count diet or artificially sweetened beverages)?: 0  How many minutes a day do you exercise enough to make your heart beat faster?: 9 or less  How many days a week do you exercise enough to make your heart beat faster?: 3 or less  How many days per week do you miss taking your medication?: 0

## 2023-05-04 ENCOUNTER — OFFICE VISIT (OUTPATIENT)
Dept: CARDIOLOGY | Facility: CLINIC | Age: 83
End: 2023-05-04
Attending: INTERNAL MEDICINE
Payer: MEDICARE

## 2023-05-04 VITALS
HEART RATE: 74 BPM | HEIGHT: 71 IN | SYSTOLIC BLOOD PRESSURE: 122 MMHG | DIASTOLIC BLOOD PRESSURE: 72 MMHG | BODY MASS INDEX: 39.11 KG/M2 | RESPIRATION RATE: 16 BRPM | WEIGHT: 279.4 LBS

## 2023-05-04 DIAGNOSIS — I25.10 CORONARY ARTERY DISEASE INVOLVING NATIVE CORONARY ARTERY WITHOUT ANGINA PECTORIS, UNSPECIFIED WHETHER NATIVE OR TRANSPLANTED HEART: Primary | ICD-10-CM

## 2023-05-04 DIAGNOSIS — Z95.810 ICD (IMPLANTABLE CARDIOVERTER-DEFIBRILLATOR) IN PLACE: ICD-10-CM

## 2023-05-04 DIAGNOSIS — I25.5 ISCHEMIC CARDIOMYOPATHY: ICD-10-CM

## 2023-05-04 DIAGNOSIS — Z95.810 ICD (IMPLANTABLE CARDIOVERTER-DEFIBRILLATOR) IN PLACE: Primary | ICD-10-CM

## 2023-05-04 DIAGNOSIS — E78.5 DYSLIPIDEMIA: ICD-10-CM

## 2023-05-04 DIAGNOSIS — I47.29 NSVT (NONSUSTAINED VENTRICULAR TACHYCARDIA) (H): ICD-10-CM

## 2023-05-04 LAB
MDC_IDC_LEAD_IMPLANT_DT: NORMAL
MDC_IDC_LEAD_LOCATION: NORMAL
MDC_IDC_LEAD_LOCATION_DETAIL_1: NORMAL
MDC_IDC_LEAD_MFG: NORMAL
MDC_IDC_LEAD_MODEL: NORMAL
MDC_IDC_LEAD_POLARITY_TYPE: NORMAL
MDC_IDC_LEAD_SERIAL: NORMAL
MDC_IDC_LEAD_SPECIAL_FUNCTION: NORMAL
MDC_IDC_MSMT_BATTERY_DTM: NORMAL
MDC_IDC_MSMT_BATTERY_REMAINING_LONGEVITY: 49 MO
MDC_IDC_MSMT_BATTERY_RRT_TRIGGER: 2.73
MDC_IDC_MSMT_BATTERY_STATUS: NORMAL
MDC_IDC_MSMT_BATTERY_VOLTAGE: 2.99 V
MDC_IDC_MSMT_CAP_CHARGE_DTM: NORMAL
MDC_IDC_MSMT_CAP_CHARGE_ENERGY: 18 J
MDC_IDC_MSMT_CAP_CHARGE_TIME: 4.04
MDC_IDC_MSMT_CAP_CHARGE_TYPE: NORMAL
MDC_IDC_MSMT_LEADCHNL_RA_IMPEDANCE_VALUE: 342 OHM
MDC_IDC_MSMT_LEADCHNL_RV_IMPEDANCE_VALUE: 304 OHM
MDC_IDC_MSMT_LEADCHNL_RV_IMPEDANCE_VALUE: 342 OHM
MDC_IDC_MSMT_LEADCHNL_RV_PACING_THRESHOLD_AMPLITUDE: 0.75 V
MDC_IDC_MSMT_LEADCHNL_RV_PACING_THRESHOLD_AMPLITUDE: 0.75 V
MDC_IDC_MSMT_LEADCHNL_RV_PACING_THRESHOLD_PULSEWIDTH: 0.4 MS
MDC_IDC_MSMT_LEADCHNL_RV_PACING_THRESHOLD_PULSEWIDTH: 0.4 MS
MDC_IDC_MSMT_LEADCHNL_RV_SENSING_INTR_AMPL: 5.12 MV
MDC_IDC_MSMT_LEADCHNL_RV_SENSING_INTR_AMPL: 7.1 MV
MDC_IDC_MSMT_LEADCHNL_RV_SENSING_INTR_AMPL: 7.12 MV
MDC_IDC_PG_IMPLANT_DTM: NORMAL
MDC_IDC_PG_MFG: NORMAL
MDC_IDC_PG_MODEL: NORMAL
MDC_IDC_PG_SERIAL: NORMAL
MDC_IDC_PG_TYPE: NORMAL
MDC_IDC_SESS_CLINIC_NAME: NORMAL
MDC_IDC_SESS_DTM: NORMAL
MDC_IDC_SESS_TYPE: NORMAL
MDC_IDC_SET_BRADY_HYSTRATE: NORMAL
MDC_IDC_SET_BRADY_LOWRATE: 40 {BEATS}/MIN
MDC_IDC_SET_BRADY_MODE: NORMAL
MDC_IDC_SET_LEADCHNL_RV_PACING_AMPLITUDE: 1.5 V
MDC_IDC_SET_LEADCHNL_RV_PACING_ANODE_ELECTRODE_1: NORMAL
MDC_IDC_SET_LEADCHNL_RV_PACING_ANODE_LOCATION_1: NORMAL
MDC_IDC_SET_LEADCHNL_RV_PACING_CAPTURE_MODE: NORMAL
MDC_IDC_SET_LEADCHNL_RV_PACING_CATHODE_ELECTRODE_1: NORMAL
MDC_IDC_SET_LEADCHNL_RV_PACING_CATHODE_LOCATION_1: NORMAL
MDC_IDC_SET_LEADCHNL_RV_PACING_POLARITY: NORMAL
MDC_IDC_SET_LEADCHNL_RV_PACING_PULSEWIDTH: 0.4 MS
MDC_IDC_SET_LEADCHNL_RV_SENSING_ANODE_ELECTRODE_1: NORMAL
MDC_IDC_SET_LEADCHNL_RV_SENSING_ANODE_LOCATION_1: NORMAL
MDC_IDC_SET_LEADCHNL_RV_SENSING_CATHODE_ELECTRODE_1: NORMAL
MDC_IDC_SET_LEADCHNL_RV_SENSING_CATHODE_LOCATION_1: NORMAL
MDC_IDC_SET_LEADCHNL_RV_SENSING_POLARITY: NORMAL
MDC_IDC_SET_LEADCHNL_RV_SENSING_SENSITIVITY: 0.3 MV
MDC_IDC_SET_ZONE_DETECTION_BEATS_DENOMINATOR: 32 {BEATS}
MDC_IDC_SET_ZONE_DETECTION_BEATS_NUMERATOR: 24 {BEATS}
MDC_IDC_SET_ZONE_DETECTION_INTERVAL: 240 MS
MDC_IDC_SET_ZONE_DETECTION_INTERVAL: 320 MS
MDC_IDC_SET_ZONE_DETECTION_INTERVAL: 360 MS
MDC_IDC_SET_ZONE_DETECTION_INTERVAL: 400 MS
MDC_IDC_SET_ZONE_TYPE: NORMAL
MDC_IDC_STAT_BRADY_DTM_END: NORMAL
MDC_IDC_STAT_BRADY_DTM_START: NORMAL
MDC_IDC_STAT_BRADY_RV_PERCENT_PACED: 0.34 %
MDC_IDC_STAT_EPISODE_RECENT_COUNT: 0
MDC_IDC_STAT_EPISODE_RECENT_COUNT: 1
MDC_IDC_STAT_EPISODE_RECENT_COUNT: 67
MDC_IDC_STAT_EPISODE_RECENT_COUNT_DTM_END: NORMAL
MDC_IDC_STAT_EPISODE_RECENT_COUNT_DTM_START: NORMAL
MDC_IDC_STAT_EPISODE_TOTAL_COUNT: 0
MDC_IDC_STAT_EPISODE_TOTAL_COUNT: 1
MDC_IDC_STAT_EPISODE_TOTAL_COUNT: 3
MDC_IDC_STAT_EPISODE_TOTAL_COUNT: 93
MDC_IDC_STAT_EPISODE_TOTAL_COUNT_DTM_END: NORMAL
MDC_IDC_STAT_EPISODE_TOTAL_COUNT_DTM_START: NORMAL
MDC_IDC_STAT_EPISODE_TYPE: NORMAL
MDC_IDC_STAT_TACHYTHERAPY_ATP_DELIVERED_RECENT: 0
MDC_IDC_STAT_TACHYTHERAPY_ATP_DELIVERED_TOTAL: 0
MDC_IDC_STAT_TACHYTHERAPY_RECENT_DTM_END: NORMAL
MDC_IDC_STAT_TACHYTHERAPY_RECENT_DTM_START: NORMAL
MDC_IDC_STAT_TACHYTHERAPY_SHOCKS_ABORTED_RECENT: 0
MDC_IDC_STAT_TACHYTHERAPY_SHOCKS_ABORTED_TOTAL: 0
MDC_IDC_STAT_TACHYTHERAPY_SHOCKS_DELIVERED_RECENT: 0
MDC_IDC_STAT_TACHYTHERAPY_SHOCKS_DELIVERED_TOTAL: 1
MDC_IDC_STAT_TACHYTHERAPY_TOTAL_DTM_END: NORMAL
MDC_IDC_STAT_TACHYTHERAPY_TOTAL_DTM_START: NORMAL

## 2023-05-04 PROCEDURE — 93282 PRGRMG EVAL IMPLANTABLE DFB: CPT | Performed by: INTERNAL MEDICINE

## 2023-05-04 PROCEDURE — 99214 OFFICE O/P EST MOD 30 MIN: CPT | Performed by: INTERNAL MEDICINE

## 2023-05-04 NOTE — LETTER
5/4/2023    Luis Elena MD  [address] [facility] [address]    RE: Chaim Dee       Dear Colleague,     I had the pleasure of seeing Chaim Dee in the [facility].         [facility]   [address], [place] [address]   [email or web address]   OFFICE: [phone]          Thank you Luis Jolly for asking the [facility] team to participate in the care of your patient, Chaim Dee.     Impression and Plan     1.  Coronary artery disease.  Chaim has known coronary artery disease.  Specifically, Chaim had suffered a remote anterior myocardial infarction in 1999 at which time he underwent stenting of the LAD and circumflex coronary arteries.  Patient is on antiplatelet therapy with clopidogrel.  Continue clopidogrel.  Continue metoprolol succinate 100 mg daily.    2.  Cardiomyopathy.  Chaim in the past had been noted to have mild depression and left ventricular systolic performance.  Echocardiogram 14 January 2022 suggested ejection fraction of 55-60% (ejection fraction 44% by nuclear perfusion imaging study performed that same day on 14 January 2022).    He is subjectively and objectively has been doing well.  Continue metoprolol as per problem #1.    3.  NSVT.  Chaim underwent ICD implantation 12 February 2018 (Medtronic [id number] Evera XT VR device).  He has been followed intermittently through the Electrophysiology Subsection.  Device interrogation 6 April 2023 revealed a solitary episode of NSVT of 15 beats.  Continue metoprolol succinate 100 mg daily as per problem #1 & 2.    4.  Dyslipidemia.  Lipid profile 1 November 2022 revealed LDL 75 mg/dL and HDL 34 mg/dL.  Continue statin therapy.    Tentatively plan on follow-up in 6 months.  Chaim will also follow routinely through the Device Clinic per protocol.      35 minutes spent reviewing prior records (including documentation, laboratory  studies, cardiac testing/imaging), interview with patient along with physical exam, planning, and subsequent documentation/crafting of note).           History of Present Illness    Once again I would like to thank you again for asking me to participate in the care of your patient, Chaim Dee.  As you know, but to reiterate for my own records, Chaim Dee is a 83 year old male with known coronary artery disease.  Specifically, Chaim had suffered a remote anterior myocardial infarction in 1999 at which time he underwent stenting of the LAD and circumflex coronary arteries.    On interview today, Chaim is without specific cardiac complaint.  He reports no chest pain.  His breathing is comfortable.  His exercise tolerance is at his baseline.  No palpitations or lightheadedness.    Further review of systems is otherwise negative/noncontributory (medical record and 13 point review of systems reviewed as well and pertinent positives noted).         Cardiac Diagnostics      Echocardiogram 14 January 2022:  Normal left ventricular size and systolic performance with ejection fraction of 55-60%.  No significant valvular heart disease  Normal right ventricular size and systolic performance.  Normal atrial dimensions.    Regadenoson nuclear perfusion study 14 January 2022:  There is a large area of a severe degree of transmural infarction in the mid to distal anterior, apical, inferior, lateral and septal segment(s) of the left ventricle. No significant ischemia is identified.  The left ventricular ejection fraction at stress is 44%.    Device interrogation 6 April 2023 (Medtronic [id number] Evera XT VR device implanted 12 February 2018):  Encounter Type: Routine remote device check  Device: Medtronic Evera (S) ICD  Pacing %/Programmed:  0.1%, VVI 40bpm  Lead(s): Stable  Battery longevity: Estimating 4.1 years remaining  Presenting: VS 55bpm  Atrial arrhythmias: Since 31 October 2022, none  "detected  Anticoagulant: None  Ventricular arrhythmias: 1 VHR episode logged 15 December 2022, 15 beats NSVT 211bpm.  Device/Lead alerts: Potential for shortened RRT-to-EOL, measurements stable and patient able to hear Alert Tones.   Comments: Normal device function.           Physical Examination       /72 (BP Location: Left arm, Patient Position: Sitting, Cuff Size: Adult Large)   Pulse 74   Resp 16   Ht 1.791 m (5' 10.5\")   Wt 126.7 kg (279 lb 6.4 oz)   BMI 39.52 kg/m          Wt Readings from Last 3 Encounters:   05/04/23 126.7 kg (279 lb 6.4 oz)   05/01/23 126.7 kg (279 lb 6.4 oz)   11/01/22 126.1 kg (277 lb 14.4 oz)     The patient is alert and oriented times three. Sclerae are anicteric. Mucosal membranes are moist. Jugular venous pressure is normal. No significant adenopathy/thyromegally appreciated. Lungs are clear with good expansion. On cardiovascular exam, the patient has a regular S1 and S2. Abdomen is soft and non-tender. Extremities reveal no clubbing, cyanosis.         Medications  Allergies   Current Outpatient Medications   Medication Sig Dispense Refill    acetaminophen (TYLENOL) 325 MG tablet [ACETAMINOPHEN (TYLENOL) 325 MG TABLET] Take 2 tablets (650 mg total) by mouth every 4 (four) hours as needed.  0    clopidogrel (PLAVIX) 75 MG tablet Take 1 tablet (75 mg) by mouth daily 90 tablet 1    diphenhydrAMINE (BENADRYL) 25 MG tablet Take 1-2 tablets (25-50 mg) by mouth nightly as needed (angioedema)      famotidine (PEPCID) 20 MG tablet Take 1 tablet (20 mg) by mouth At Bedtime 180 tablet 3    fexofenadine (ALLEGRA) 180 MG tablet Take 1 tablet (180 mg) by mouth daily 90 tablet 3    hydrochlorothiazide (HYDRODIURIL) 25 MG tablet TAKE 1 TABLET(25 MG) BY MOUTH DAILY 90 tablet 1    metoprolol succinate ER (TOPROL XL) 100 MG 24 hr tablet Take 1 tablet (100 mg) by mouth daily 90 tablet 1    nitroglycerin (NITROSTAT) 0.4 MG SL tablet [NITROGLYCERIN (NITROSTAT) 0.4 MG SL TABLET] Place 1 tablet " (0.4 mg total) under the tongue as needed. Use as directed. 30 tablet 0    simvastatin (ZOCOR) 40 MG tablet TAKE 1 TABLET(40 MG) BY MOUTH AT BEDTIME 90 tablet 1    vitamin D3 (CHOLECALCIFEROL) 50 mcg (2000 units) tablet Take 1 tablet (50 mcg) by mouth daily         Allergies   Allergen Reactions    Ace Inhibitors Angioedema    Arb-Angiotensin Receptor Antagonist [Angiotensin Receptor Blockers] Angioedema    Aspirin Unknown     Angioedema - stopped aspirin, angioedema resolved, restarted recurred (fairly strong evidence)    Gabapentin Angioedema    Losartan Angioedema    Ramipril Angioedema    Seafood Nausea and Vomiting    Shellfish Containing Products [Shellfish-Derived Products] Nausea and Vomiting     SHRIMP- worst reaction to.          Lab Results    Chemistry/lipid CBC Cardiac Enzymes/BNP/TSH/INR   Recent Labs   Lab Test 11/01/22  0935   CHOL 130   HDL 34*   LDL 75   TRIG 103     Recent Labs   Lab Test 11/01/22  0935 10/04/21  0848 04/05/21  0840   LDL 75 64 70     Recent Labs   Lab Test 05/01/23  0953      POTASSIUM 4.0   CHLORIDE 102   CO2 25   GLC 99   BUN 18.5   CR 1.04   GFRESTIMATED 70   DAMIÁN 9.5     Recent Labs   Lab Test 05/01/23  0953 11/01/22  0935 12/23/21  1528   CR 1.04 0.92 1.01     Recent Labs   Lab Test 01/17/19  0908   A1C 5.7          Recent Labs   Lab Test 05/01/23  0953   WBC 7.1   HGB 15.3   HCT 47.0   MCV 94   *     Recent Labs   Lab Test 05/01/23  0953 11/01/22  0935 10/04/21  0848   HGB 15.3 15.3 16.1    No results for input(s): TROPONINI in the last 06837 hours.  No results for input(s): BNP, NTBNPI, NTBNP in the last 12761 hours.  Recent Labs   Lab Test 06/23/20  0828   TSH 1.53     No results for input(s): INR in the last 28628 hours.     Medical History  Surgical History Family History Social History   Past Medical History:   Diagnosis Date    Arthritis unknown    right knee    Cholecystitis     Coronary artery disease     had stents placed 08/08/1999     Hypercholesteremia     Hypertension     ICD (implantable cardioverter-defibrillator) in place     Myocardial infarction (H) 1999    Obesity     Scrotal swelling     Sleep apnea     can't tolerate cpap     Past Surgical History:   Procedure Laterality Date    CARDIAC DEFIBRILLATOR PLACEMENT  2015    CORONARY STENT PLACEMENT  08/08/1999    LAD and Circ    FRACTURE SURGERY      LAPAROSCOPIC CHOLECYSTECTOMY N/A 11/9/2018    Procedure: LAPAROSCOPIC CHOLECYSTECTOMY;  Surgeon: Mark Bee MD;  Location: [facility] Main OR;  Service: General    ORIF TIBIA & FIBULA FRACTURES Right 1945    TONSILLECTOMY  1944     Family History   Problem Relation Age of Onset    Other - See Comments Mother         gallbladder surgery    Cerebrovascular Disease Father     Coronary Artery Disease Brother         Social History     Socioeconomic History    Marital status: Single     Spouse name: Not on file    Number of children: Not on file    Years of education: Not on file    Highest education level: Not on file   Occupational History    Not on file   Tobacco Use    Smoking status: Former    Smokeless tobacco: Never   Vaping Use    Vaping status: Never Used   Substance and Sexual Activity    Alcohol use: Yes     Alcohol/week: 1.0 standard drink of alcohol     Comment: Alcoholic Drinks/day: rare - only in [place]    Drug use: No    Sexual activity: Never   Other Topics Concern    Not on file   Social History Narrative    Rod Villanueva, [employer].  Graduate [employer]  Was  for [employer].  From [place].  He is into genealogy.       Social Determinants of Health     Financial Resource Strain: Not on file   Food Insecurity: Not on file   Transportation Needs: Not on file   Physical Activity: Not on file   Stress: Not on file   Social Connections: Not on file   Intimate Partner Violence: Not on file   Housing Stability: Not on file                      Thank you for allowing me to participate in the care of your  patient.      Sincerely,     Loyda Contreras MD     [facility]  cc:   Rohan Phan MD  [address] [facility] [address]  [place] [address]

## 2023-05-04 NOTE — PROGRESS NOTES
[facility] HE[facility]ARE [address], [place] [address]   [email or web address]   OFFICE: [phone]          Thank you Luis Jolly for asking the [facility] team to participate in the care of your patient, Chaim Dee.     Impression and Plan     1.  Coronary artery disease.  Chaim has known coronary artery disease.  Specifically, Chaim had suffered a remote anterior myocardial infarction in 1999 at which time he underwent stenting of the LAD and circumflex coronary arteries.  Patient is on antiplatelet therapy with clopidogrel.    Continue clopidogrel.    Continue metoprolol succinate 100 mg daily.    2.  Cardiomyopathy.  Chaim in the past had been noted to have mild depression and left ventricular systolic performance.  Echocardiogram 14 January 2022 suggested ejection fraction of 55-60% (ejection fraction 44% by nuclear perfusion imaging study performed that same day on 14 January 2022).    He is subjectively and objectively has been doing well.    Continue metoprolol as per problem #1.    3.  NSVT.  Chaim underwent ICD implantation 12 February 2018 (Medtronic [id number] Evera XT VR device).  He has been followed intermittently through the Electrophysiology Subsection.  Device interrogation 6 April 2023 revealed a solitary episode of NSVT of 15 beats.    Continue metoprolol succinate 100 mg daily as per problem #1 & 2.    4.  Dyslipidemia.  Lipid profile 1 November 2022 revealed LDL 75 mg/dL and HDL 34 mg/dL.    Continue statin therapy.    Tentatively plan on follow-up in 6 months.  Chaim will also follow routinely through the Device Clinic per protocol.      35 minutes spent reviewing prior records (including documentation, laboratory studies, cardiac testing/imaging), interview with patient along with physical exam, planning, and subsequent documentation/crafting of note).           History of Present Illness    Once again I would like  to thank you again for asking me to participate in the care of your patient, Chaim Dee.  As you know, but to reiterate for my own records, Chaim Dee is a 83 year old male with known coronary artery disease.  Specifically, Chaim had suffered a remote anterior myocardial infarction in 1999 at which time he underwent stenting of the LAD and circumflex coronary arteries.    On interview today, Chaim is without specific cardiac complaint.  He reports no chest pain.  His breathing is comfortable.  His exercise tolerance is at his baseline.  No palpitations or lightheadedness.    Further review of systems is otherwise negative/noncontributory (medical record and 13 point review of systems reviewed as well and pertinent positives noted).         Cardiac Diagnostics      Echocardiogram 14 January 2022:  1. Normal left ventricular size and systolic performance with ejection fraction of 55-60%.  2. No significant valvular heart disease  3. Normal right ventricular size and systolic performance.  4. Normal atrial dimensions.    Regadenoson nuclear perfusion study 14 January 2022:  1. There is a large area of a severe degree of transmural infarction in the mid to distal anterior, apical, inferior, lateral and septal segment(s) of the left ventricle. No significant ischemia is identified.  2. The left ventricular ejection fraction at stress is 44%.    Device interrogation 6 April 2023 (Medtronic [id number] Evera XT VR device implanted 12 February 2018):  1. Encounter Type: Routine remote device check  2. Device: Medtronic Evera (S) ICD  3. Pacing %/Programmed:  0.1%, VVI 40bpm  4. Lead(s): Stable  5. Battery longevity: Estimating 4.1 years remaining  6. Presenting: VS 55bpm  7. Atrial arrhythmias: Since 31 October 2022, none detected  8. Anticoagulant: None  9. Ventricular arrhythmias: 1 VHR episode logged 15 December 2022, 15 beats NSVT 211bpm.  10. Device/Lead alerts: Potential for shortened RRT-to-EOL,  "measurements stable and patient able to hear Alert Tones.   11. Comments: Normal device function.           Physical Examination       /72 (BP Location: Left arm, Patient Position: Sitting, Cuff Size: Adult Large)   Pulse 74   Resp 16   Ht 1.791 m (5' 10.5\")   Wt 126.7 kg (279 lb 6.4 oz)   BMI 39.52 kg/m          Wt Readings from Last 3 Encounters:   05/04/23 126.7 kg (279 lb 6.4 oz)   05/01/23 126.7 kg (279 lb 6.4 oz)   11/01/22 126.1 kg (277 lb 14.4 oz)     The patient is alert and oriented times three. Sclerae are anicteric. Mucosal membranes are moist. Jugular venous pressure is normal. No significant adenopathy/thyromegally appreciated. Lungs are clear with good expansion. On cardiovascular exam, the patient has a regular S1 and S2. Abdomen is soft and non-tender. Extremities reveal no clubbing, cyanosis.         Medications  Allergies   Current Outpatient Medications   Medication Sig Dispense Refill     acetaminophen (TYLENOL) 325 MG tablet [ACETAMINOPHEN (TYLENOL) 325 MG TABLET] Take 2 tablets (650 mg total) by mouth every 4 (four) hours as needed.  0     clopidogrel (PLAVIX) 75 MG tablet Take 1 tablet (75 mg) by mouth daily 90 tablet 1     diphenhydrAMINE (BENADRYL) 25 MG tablet Take 1-2 tablets (25-50 mg) by mouth nightly as needed (angioedema)       famotidine (PEPCID) 20 MG tablet Take 1 tablet (20 mg) by mouth At Bedtime 180 tablet 3     fexofenadine (ALLEGRA) 180 MG tablet Take 1 tablet (180 mg) by mouth daily 90 tablet 3     hydrochlorothiazide (HYDRODIURIL) 25 MG tablet TAKE 1 TABLET(25 MG) BY MOUTH DAILY 90 tablet 1     metoprolol succinate ER (TOPROL XL) 100 MG 24 hr tablet Take 1 tablet (100 mg) by mouth daily 90 tablet 1     nitroglycerin (NITROSTAT) 0.4 MG SL tablet [NITROGLYCERIN (NITROSTAT) 0.4 MG SL TABLET] Place 1 tablet (0.4 mg total) under the tongue as needed. Use as directed. 30 tablet 0     simvastatin (ZOCOR) 40 MG tablet TAKE 1 TABLET(40 MG) BY MOUTH AT BEDTIME 90 tablet 1 "     vitamin D3 (CHOLECALCIFEROL) 50 mcg (2000 units) tablet Take 1 tablet (50 mcg) by mouth daily         Allergies   Allergen Reactions     Ace Inhibitors Angioedema     Arb-Angiotensin Receptor Antagonist [Angiotensin Receptor Blockers] Angioedema     Aspirin Unknown     Angioedema - stopped aspirin, angioedema resolved, restarted recurred (fairly strong evidence)     Gabapentin Angioedema     Losartan Angioedema     Ramipril Angioedema     Seafood Nausea and Vomiting     Shellfish Containing Products [Shellfish-Derived Products] Nausea and Vomiting     SHRIMP- worst reaction to.          Lab Results    Chemistry/lipid CBC Cardiac Enzymes/BNP/TSH/INR   Recent Labs   Lab Test 11/01/22  0935   CHOL 130   HDL 34*   LDL 75   TRIG 103     Recent Labs   Lab Test 11/01/22  0935 10/04/21  0848 04/05/21  0840   LDL 75 64 70     Recent Labs   Lab Test 05/01/23  0953      POTASSIUM 4.0   CHLORIDE 102   CO2 25   GLC 99   BUN 18.5   CR 1.04   GFRESTIMATED 70   DAMIÁN 9.5     Recent Labs   Lab Test 05/01/23  0953 11/01/22  0935 12/23/21  1528   CR 1.04 0.92 1.01     Recent Labs   Lab Test 01/17/19  0908   A1C 5.7          Recent Labs   Lab Test 05/01/23  0953   WBC 7.1   HGB 15.3   HCT 47.0   MCV 94   *     Recent Labs   Lab Test 05/01/23  0953 11/01/22  0935 10/04/21  0848   HGB 15.3 15.3 16.1    No results for input(s): TROPONINI in the last 29820 hours.  No results for input(s): BNP, NTBNPI, NTBNP in the last 82874 hours.  Recent Labs   Lab Test 06/23/20  0828   TSH 1.53     No results for input(s): INR in the last 49959 hours.     Medical History  Surgical History Family History Social History   Past Medical History:   Diagnosis Date     Arthritis unknown    right knee     Cholecystitis      Coronary artery disease     had stents placed 08/08/1999     Hypercholesteremia      Hypertension      ICD (implantable cardioverter-defibrillator) in place      Myocardial infarction (H) 1999     Obesity      Scrotal swelling       Sleep apnea     can't tolerate cpap     Past Surgical History:   Procedure Laterality Date     CARDIAC DEFIBRILLATOR PLACEMENT  2015     CORONARY STENT PLACEMENT  08/08/1999    LAD and Circ     FRACTURE SURGERY       LAPAROSCOPIC CHOLECYSTECTOMY N/A 11/9/2018    Procedure: LAPAROSCOPIC CHOLECYSTECTOMY;  Surgeon: Mark Bee MD;  Location: [facility] Main OR;  Service: General     ORIF TIBIA & FIBULA FRACTURES Right 1945     TONSILLECTOMY  1944     Family History   Problem Relation Age of Onset     Other - See Comments Mother         gallbladder surgery     Cerebrovascular Disease Father      Coronary Artery Disease Brother         Social History     Socioeconomic History     Marital status: Single     Spouse name: Not on file     Number of children: Not on file     Years of education: Not on file     Highest education level: Not on file   Occupational History     Not on file   Tobacco Use     Smoking status: Former     Smokeless tobacco: Never   Vaping Use     Vaping status: Never Used   Substance and Sexual Activity     Alcohol use: Yes     Alcohol/week: 1.0 standard drink of alcohol     Comment: Alcoholic Drinks/day: rare - only in [place]     Drug use: No     Sexual activity: Never   Other Topics Concern     Not on file   Social History Narrative    Rod Villanueva, [employer].  Graduate [employer]  Was  for [employer].  From [place].  He is into genealogy.       Social Determinants of Health     Financial Resource Strain: Not on file   Food Insecurity: Not on file   Transportation Needs: Not on file   Physical Activity: Not on file   Stress: Not on file   Social Connections: Not on file   Intimate Partner Violence: Not on file   Housing Stability: Not on file

## 2023-07-07 DIAGNOSIS — I25.10 CHRONIC CORONARY ARTERY DISEASE: ICD-10-CM

## 2023-07-07 RX ORDER — CLOPIDOGREL BISULFATE 75 MG/1
TABLET ORAL
Qty: 90 TABLET | Refills: 1 | Status: SHIPPED | OUTPATIENT
Start: 2023-07-07 | End: 2024-01-03

## 2023-07-07 NOTE — TELEPHONE ENCOUNTER
"Routing refill request to provider for review/approval because:  Labs out of range:  plt 134    Last Written Prescription Date:  1/13/2023  Last Fill Quantity: 90,  # refills: 1   Last office visit provider:  5/1/2023   Future appointment 11/2/2023  Requested Prescriptions   Pending Prescriptions Disp Refills     clopidogrel (PLAVIX) 75 MG tablet [Pharmacy Med Name: CLOPIDOGREL 75MG TABLETS] 90 tablet 1     Sig: TAKE 1 TABLET(75 MG) BY MOUTH DAILY       Plavix Failed - 7/7/2023 12:11 PM        Failed - Normal Platelets on file in past 12 months     Recent Labs   Lab Test 05/01/23  0953   *               Passed - No active PPI on record unless is Protonix        Passed - Normal HGB on file in past 12 months     Recent Labs   Lab Test 05/01/23  0953   HGB 15.3               Passed - Recent (12 mo) or future (30 days) visit within the authorizing provider's specialty     Patient has had an office visit with the authorizing provider or a provider within the authorizing providers department within the previous 12 mos or has a future within next 30 days. See \"Patient Info\" tab in inbasket, or \"Choose Columns\" in Meds & Orders section of the refill encounter.              Passed - Medication is active on med list        Passed - Patient is age 18 or older             Tamra Hinds RN 07/07/23 12:11 PM  "

## 2023-08-07 ENCOUNTER — ANCILLARY PROCEDURE (OUTPATIENT)
Dept: CARDIOLOGY | Facility: CLINIC | Age: 83
End: 2023-08-07
Attending: INTERNAL MEDICINE
Payer: MEDICARE

## 2023-08-07 DIAGNOSIS — Z95.810 ICD (IMPLANTABLE CARDIOVERTER-DEFIBRILLATOR) IN PLACE: ICD-10-CM

## 2023-08-07 DIAGNOSIS — I25.5 ISCHEMIC CARDIOMYOPATHY: ICD-10-CM

## 2023-08-07 LAB
MDC_IDC_EPISODE_DTM: NORMAL
MDC_IDC_EPISODE_DURATION: 1 S
MDC_IDC_EPISODE_ID: 100
MDC_IDC_EPISODE_TYPE: NORMAL
MDC_IDC_LEAD_IMPLANT_DT: NORMAL
MDC_IDC_LEAD_LOCATION: NORMAL
MDC_IDC_LEAD_LOCATION_DETAIL_1: NORMAL
MDC_IDC_LEAD_MFG: NORMAL
MDC_IDC_LEAD_MODEL: NORMAL
MDC_IDC_LEAD_POLARITY_TYPE: NORMAL
MDC_IDC_LEAD_SERIAL: NORMAL
MDC_IDC_LEAD_SPECIAL_FUNCTION: NORMAL
MDC_IDC_MSMT_BATTERY_DTM: NORMAL
MDC_IDC_MSMT_BATTERY_REMAINING_LONGEVITY: 47 MO
MDC_IDC_MSMT_BATTERY_RRT_TRIGGER: 2.73
MDC_IDC_MSMT_BATTERY_STATUS: NORMAL
MDC_IDC_MSMT_BATTERY_VOLTAGE: 2.98 V
MDC_IDC_MSMT_CAP_CHARGE_DTM: NORMAL
MDC_IDC_MSMT_CAP_CHARGE_ENERGY: 18 J
MDC_IDC_MSMT_CAP_CHARGE_TIME: 4.08
MDC_IDC_MSMT_CAP_CHARGE_TYPE: NORMAL
MDC_IDC_MSMT_LEADCHNL_RV_IMPEDANCE_VALUE: 304 OHM
MDC_IDC_MSMT_LEADCHNL_RV_IMPEDANCE_VALUE: 323 OHM
MDC_IDC_MSMT_LEADCHNL_RV_PACING_THRESHOLD_AMPLITUDE: 0.75 V
MDC_IDC_MSMT_LEADCHNL_RV_PACING_THRESHOLD_PULSEWIDTH: 0.4 MS
MDC_IDC_MSMT_LEADCHNL_RV_SENSING_INTR_AMPL: 4.88 MV
MDC_IDC_MSMT_LEADCHNL_RV_SENSING_INTR_AMPL: 4.88 MV
MDC_IDC_PG_IMPLANT_DTM: NORMAL
MDC_IDC_PG_MFG: NORMAL
MDC_IDC_PG_MODEL: NORMAL
MDC_IDC_PG_SERIAL: NORMAL
MDC_IDC_PG_TYPE: NORMAL
MDC_IDC_SESS_CLINIC_NAME: NORMAL
MDC_IDC_SESS_DTM: NORMAL
MDC_IDC_SESS_TYPE: NORMAL
MDC_IDC_SET_BRADY_HYSTRATE: NORMAL
MDC_IDC_SET_BRADY_LOWRATE: 40 {BEATS}/MIN
MDC_IDC_SET_BRADY_MODE: NORMAL
MDC_IDC_SET_LEADCHNL_RV_PACING_AMPLITUDE: 1.5 V
MDC_IDC_SET_LEADCHNL_RV_PACING_ANODE_ELECTRODE_1: NORMAL
MDC_IDC_SET_LEADCHNL_RV_PACING_ANODE_LOCATION_1: NORMAL
MDC_IDC_SET_LEADCHNL_RV_PACING_CAPTURE_MODE: NORMAL
MDC_IDC_SET_LEADCHNL_RV_PACING_CATHODE_ELECTRODE_1: NORMAL
MDC_IDC_SET_LEADCHNL_RV_PACING_CATHODE_LOCATION_1: NORMAL
MDC_IDC_SET_LEADCHNL_RV_PACING_POLARITY: NORMAL
MDC_IDC_SET_LEADCHNL_RV_PACING_PULSEWIDTH: 0.4 MS
MDC_IDC_SET_LEADCHNL_RV_SENSING_ANODE_ELECTRODE_1: NORMAL
MDC_IDC_SET_LEADCHNL_RV_SENSING_ANODE_LOCATION_1: NORMAL
MDC_IDC_SET_LEADCHNL_RV_SENSING_CATHODE_ELECTRODE_1: NORMAL
MDC_IDC_SET_LEADCHNL_RV_SENSING_CATHODE_LOCATION_1: NORMAL
MDC_IDC_SET_LEADCHNL_RV_SENSING_POLARITY: NORMAL
MDC_IDC_SET_LEADCHNL_RV_SENSING_SENSITIVITY: 0.3 MV
MDC_IDC_SET_ZONE_DETECTION_BEATS_DENOMINATOR: 32 {BEATS}
MDC_IDC_SET_ZONE_DETECTION_BEATS_NUMERATOR: 24 {BEATS}
MDC_IDC_SET_ZONE_DETECTION_INTERVAL: 240 MS
MDC_IDC_SET_ZONE_DETECTION_INTERVAL: 320 MS
MDC_IDC_SET_ZONE_DETECTION_INTERVAL: 360 MS
MDC_IDC_SET_ZONE_DETECTION_INTERVAL: 400 MS
MDC_IDC_SET_ZONE_TYPE: NORMAL
MDC_IDC_STAT_BRADY_DTM_END: NORMAL
MDC_IDC_STAT_BRADY_DTM_START: NORMAL
MDC_IDC_STAT_BRADY_RV_PERCENT_PACED: 0.08 %
MDC_IDC_STAT_EPISODE_RECENT_COUNT: 0
MDC_IDC_STAT_EPISODE_RECENT_COUNT: 1
MDC_IDC_STAT_EPISODE_RECENT_COUNT_DTM_END: NORMAL
MDC_IDC_STAT_EPISODE_RECENT_COUNT_DTM_START: NORMAL
MDC_IDC_STAT_EPISODE_TOTAL_COUNT: 0
MDC_IDC_STAT_EPISODE_TOTAL_COUNT: 1
MDC_IDC_STAT_EPISODE_TOTAL_COUNT: 3
MDC_IDC_STAT_EPISODE_TOTAL_COUNT: 94
MDC_IDC_STAT_EPISODE_TOTAL_COUNT_DTM_END: NORMAL
MDC_IDC_STAT_EPISODE_TOTAL_COUNT_DTM_START: NORMAL
MDC_IDC_STAT_EPISODE_TYPE: NORMAL
MDC_IDC_STAT_TACHYTHERAPY_ATP_DELIVERED_RECENT: 0
MDC_IDC_STAT_TACHYTHERAPY_ATP_DELIVERED_TOTAL: 0
MDC_IDC_STAT_TACHYTHERAPY_RECENT_DTM_END: NORMAL
MDC_IDC_STAT_TACHYTHERAPY_RECENT_DTM_START: NORMAL
MDC_IDC_STAT_TACHYTHERAPY_SHOCKS_ABORTED_RECENT: 0
MDC_IDC_STAT_TACHYTHERAPY_SHOCKS_ABORTED_TOTAL: 0
MDC_IDC_STAT_TACHYTHERAPY_SHOCKS_DELIVERED_RECENT: 0
MDC_IDC_STAT_TACHYTHERAPY_SHOCKS_DELIVERED_TOTAL: 1
MDC_IDC_STAT_TACHYTHERAPY_TOTAL_DTM_END: NORMAL
MDC_IDC_STAT_TACHYTHERAPY_TOTAL_DTM_START: NORMAL

## 2023-08-07 PROCEDURE — 93295 DEV INTERROG REMOTE 1/2/MLT: CPT | Performed by: INTERNAL MEDICINE

## 2023-08-07 PROCEDURE — 93296 REM INTERROG EVL PM/IDS: CPT | Performed by: INTERNAL MEDICINE

## 2023-10-05 DIAGNOSIS — E78.2 MIXED HYPERLIPIDEMIA: ICD-10-CM

## 2023-10-05 DIAGNOSIS — I10 ESSENTIAL HYPERTENSION: ICD-10-CM

## 2023-10-05 RX ORDER — HYDROCHLOROTHIAZIDE 25 MG/1
TABLET ORAL
Qty: 90 TABLET | Refills: 1 | Status: SHIPPED | OUTPATIENT
Start: 2023-10-05 | End: 2024-04-02

## 2023-10-05 RX ORDER — SIMVASTATIN 40 MG
TABLET ORAL
Qty: 90 TABLET | Refills: 0 | Status: SHIPPED | OUTPATIENT
Start: 2023-10-05 | End: 2023-12-27

## 2023-10-12 DIAGNOSIS — I10 ESSENTIAL HYPERTENSION: ICD-10-CM

## 2023-10-12 RX ORDER — METOPROLOL SUCCINATE 100 MG/1
100 TABLET, EXTENDED RELEASE ORAL DAILY
Qty: 90 TABLET | Refills: 1 | Status: SHIPPED | OUTPATIENT
Start: 2023-10-12 | End: 2024-04-08

## 2023-11-02 ENCOUNTER — OFFICE VISIT (OUTPATIENT)
Dept: INTERNAL MEDICINE | Facility: CLINIC | Age: 83
End: 2023-11-02
Payer: MEDICARE

## 2023-11-02 VITALS
TEMPERATURE: 97.8 F | HEART RATE: 94 BPM | DIASTOLIC BLOOD PRESSURE: 76 MMHG | WEIGHT: 279 LBS | BODY MASS INDEX: 39.06 KG/M2 | HEIGHT: 71 IN | SYSTOLIC BLOOD PRESSURE: 136 MMHG | RESPIRATION RATE: 16 BRPM | OXYGEN SATURATION: 97 %

## 2023-11-02 DIAGNOSIS — I50.22 CHRONIC HFREF (HEART FAILURE WITH REDUCED EJECTION FRACTION) (H): ICD-10-CM

## 2023-11-02 DIAGNOSIS — R73.9 HYPERGLYCEMIA: ICD-10-CM

## 2023-11-02 DIAGNOSIS — I47.29 PAROXYSMAL VENTRICULAR TACHYCARDIA (H): ICD-10-CM

## 2023-11-02 DIAGNOSIS — D69.6 THROMBOCYTOPENIA (H): ICD-10-CM

## 2023-11-02 DIAGNOSIS — Z00.00 ANNUAL PHYSICAL EXAM: Primary | ICD-10-CM

## 2023-11-02 DIAGNOSIS — K21.9 GASTROESOPHAGEAL REFLUX DISEASE WITHOUT ESOPHAGITIS: ICD-10-CM

## 2023-11-02 DIAGNOSIS — E66.01 MORBID OBESITY (H): ICD-10-CM

## 2023-11-02 DIAGNOSIS — I10 PRIMARY HYPERTENSION: ICD-10-CM

## 2023-11-02 DIAGNOSIS — E78.5 HYPERLIPIDEMIA WITH TARGET LOW DENSITY LIPOPROTEIN (LDL) CHOLESTEROL LESS THAN 70 MG/DL: ICD-10-CM

## 2023-11-02 DIAGNOSIS — R31.29 MICROSCOPIC HEMATURIA: ICD-10-CM

## 2023-11-02 DIAGNOSIS — M15.0 PRIMARY OSTEOARTHRITIS INVOLVING MULTIPLE JOINTS: ICD-10-CM

## 2023-11-02 DIAGNOSIS — Z95.810 ICD (IMPLANTABLE CARDIOVERTER-DEFIBRILLATOR), SINGLE, IN SITU: ICD-10-CM

## 2023-11-02 DIAGNOSIS — M54.16 LUMBAR BACK PAIN WITH RADICULOPATHY AFFECTING RIGHT LOWER EXTREMITY: ICD-10-CM

## 2023-11-02 DIAGNOSIS — T78.3XXS ANGIOEDEMA, SEQUELA: ICD-10-CM

## 2023-11-02 DIAGNOSIS — Z66 DNR (DO NOT RESUSCITATE): ICD-10-CM

## 2023-11-02 DIAGNOSIS — I25.10 CHRONIC CORONARY ARTERY DISEASE: ICD-10-CM

## 2023-11-02 DIAGNOSIS — G62.9 POLYNEUROPATHY: ICD-10-CM

## 2023-11-02 DIAGNOSIS — G47.33 OSA (OBSTRUCTIVE SLEEP APNEA): ICD-10-CM

## 2023-11-02 LAB
ALBUMIN SERPL BCG-MCNC: 4.3 G/DL (ref 3.5–5.2)
ALBUMIN UR-MCNC: NEGATIVE MG/DL
ALP SERPL-CCNC: 49 U/L (ref 40–129)
ALT SERPL W P-5'-P-CCNC: 14 U/L (ref 0–70)
ANION GAP SERPL CALCULATED.3IONS-SCNC: 12 MMOL/L (ref 7–15)
APPEARANCE UR: CLEAR
AST SERPL W P-5'-P-CCNC: 24 U/L (ref 0–45)
BACTERIA #/AREA URNS HPF: ABNORMAL /HPF
BILIRUB SERPL-MCNC: 1.5 MG/DL
BILIRUB UR QL STRIP: NEGATIVE
BUN SERPL-MCNC: 21.1 MG/DL (ref 8–23)
CALCIUM SERPL-MCNC: 9.1 MG/DL (ref 8.8–10.2)
CHLORIDE SERPL-SCNC: 103 MMOL/L (ref 98–107)
CHOLEST SERPL-MCNC: 124 MG/DL
COLOR UR AUTO: YELLOW
CREAT SERPL-MCNC: 0.87 MG/DL (ref 0.67–1.17)
DEPRECATED HCO3 PLAS-SCNC: 24 MMOL/L (ref 22–29)
EGFRCR SERPLBLD CKD-EPI 2021: 85 ML/MIN/1.73M2
ERYTHROCYTE [DISTWIDTH] IN BLOOD BY AUTOMATED COUNT: 14.6 % (ref 10–15)
GLUCOSE SERPL-MCNC: 100 MG/DL (ref 70–99)
GLUCOSE UR STRIP-MCNC: NEGATIVE MG/DL
HBA1C MFR BLD: 5.6 % (ref 0–5.6)
HCT VFR BLD AUTO: 46.8 % (ref 40–53)
HDLC SERPL-MCNC: 37 MG/DL
HGB BLD-MCNC: 15.3 G/DL (ref 13.3–17.7)
HGB UR QL STRIP: ABNORMAL
KETONES UR STRIP-MCNC: NEGATIVE MG/DL
LDLC SERPL CALC-MCNC: 71 MG/DL
LEUKOCYTE ESTERASE UR QL STRIP: NEGATIVE
MCH RBC QN AUTO: 31 PG (ref 26.5–33)
MCHC RBC AUTO-ENTMCNC: 32.7 G/DL (ref 31.5–36.5)
MCV RBC AUTO: 95 FL (ref 78–100)
MUCOUS THREADS #/AREA URNS LPF: PRESENT /LPF
NITRATE UR QL: NEGATIVE
NONHDLC SERPL-MCNC: 87 MG/DL
PH UR STRIP: 5.5 [PH] (ref 5–8)
PLATELET # BLD AUTO: 133 10E3/UL (ref 150–450)
POTASSIUM SERPL-SCNC: 3.8 MMOL/L (ref 3.4–5.3)
PROT SERPL-MCNC: 6.7 G/DL (ref 6.4–8.3)
RBC # BLD AUTO: 4.94 10E6/UL (ref 4.4–5.9)
RBC #/AREA URNS AUTO: ABNORMAL /HPF
SODIUM SERPL-SCNC: 139 MMOL/L (ref 135–145)
SP GR UR STRIP: >=1.03 (ref 1–1.03)
TRIGL SERPL-MCNC: 82 MG/DL
TSH SERPL DL<=0.005 MIU/L-ACNC: 2.48 UIU/ML (ref 0.3–4.2)
UROBILINOGEN UR STRIP-ACNC: 0.2 E.U./DL
VIT B12 SERPL-MCNC: 421 PG/ML (ref 232–1245)
WBC # BLD AUTO: 7.4 10E3/UL (ref 4–11)
WBC #/AREA URNS AUTO: ABNORMAL /HPF

## 2023-11-02 PROCEDURE — 84207 ASSAY OF VITAMIN B-6: CPT | Mod: 90 | Performed by: INTERNAL MEDICINE

## 2023-11-02 PROCEDURE — 84443 ASSAY THYROID STIM HORMONE: CPT | Performed by: INTERNAL MEDICINE

## 2023-11-02 PROCEDURE — 99214 OFFICE O/P EST MOD 30 MIN: CPT | Mod: 25 | Performed by: INTERNAL MEDICINE

## 2023-11-02 PROCEDURE — 91320 SARSCV2 VAC 30MCG TRS-SUC IM: CPT | Performed by: INTERNAL MEDICINE

## 2023-11-02 PROCEDURE — 85027 COMPLETE CBC AUTOMATED: CPT | Performed by: INTERNAL MEDICINE

## 2023-11-02 PROCEDURE — 80053 COMPREHEN METABOLIC PANEL: CPT | Performed by: INTERNAL MEDICINE

## 2023-11-02 PROCEDURE — 90662 IIV NO PRSV INCREASED AG IM: CPT | Performed by: INTERNAL MEDICINE

## 2023-11-02 PROCEDURE — G0439 PPPS, SUBSEQ VISIT: HCPCS | Performed by: INTERNAL MEDICINE

## 2023-11-02 PROCEDURE — 90480 ADMN SARSCOV2 VAC 1/ONLY CMP: CPT | Performed by: INTERNAL MEDICINE

## 2023-11-02 PROCEDURE — 82607 VITAMIN B-12: CPT | Performed by: INTERNAL MEDICINE

## 2023-11-02 PROCEDURE — 99000 SPECIMEN HANDLING OFFICE-LAB: CPT | Performed by: INTERNAL MEDICINE

## 2023-11-02 PROCEDURE — 81001 URINALYSIS AUTO W/SCOPE: CPT | Performed by: INTERNAL MEDICINE

## 2023-11-02 PROCEDURE — 36415 COLL VENOUS BLD VENIPUNCTURE: CPT | Performed by: INTERNAL MEDICINE

## 2023-11-02 PROCEDURE — 83036 HEMOGLOBIN GLYCOSYLATED A1C: CPT | Performed by: INTERNAL MEDICINE

## 2023-11-02 PROCEDURE — 80061 LIPID PANEL: CPT | Performed by: INTERNAL MEDICINE

## 2023-11-02 PROCEDURE — G0008 ADMIN INFLUENZA VIRUS VAC: HCPCS | Performed by: INTERNAL MEDICINE

## 2023-11-02 RX ORDER — RESPIRATORY SYNCYTIAL VIRUS VACCINE 120MCG/0.5
0.5 KIT INTRAMUSCULAR ONCE
Qty: 1 EACH | Refills: 0 | Status: CANCELLED | OUTPATIENT
Start: 2023-11-02 | End: 2023-11-02

## 2023-11-02 ASSESSMENT — ENCOUNTER SYMPTOMS
COUGH: 0
WEAKNESS: 0
EYE PAIN: 0
FREQUENCY: 0
SORE THROAT: 0
DIARRHEA: 0
PARESTHESIAS: 0
CHILLS: 0
MYALGIAS: 1
ABDOMINAL PAIN: 0
FEVER: 0
HEMATOCHEZIA: 0
NAUSEA: 0
NERVOUS/ANXIOUS: 0
DYSURIA: 0
PALPITATIONS: 0
HEMATURIA: 0
HEADACHES: 0
HEARTBURN: 0
DIZZINESS: 0
SHORTNESS OF BREATH: 0
CONSTIPATION: 0

## 2023-11-02 ASSESSMENT — PAIN SCALES - GENERAL: PAINLEVEL: NO PAIN (0)

## 2023-11-02 ASSESSMENT — ACTIVITIES OF DAILY LIVING (ADL): CURRENT_FUNCTION: NO ASSISTANCE NEEDED

## 2023-11-02 NOTE — PROGRESS NOTES
"SUBJECTIVE:   Chaim is a 83 year old who presents for Preventive Visit.      11/2/2023     8:46 AM   Additional Questions   Roomed by Lara MIR       Are you in the first 12 months of your Medicare coverage?  No    Healthy Habits:     In general, how would you rate your overall health?  Good    Frequency of exercise:  1 day/week    Duration of exercise:  Less than 15 minutes    Do you usually eat at least 4 servings of fruit and vegetables a day, include whole grains    & fiber and avoid regularly eating high fat or \"junk\" foods?  Yes    Taking medications regularly:  Yes    Medication side effects:  None    Ability to successfully perform activities of daily living:  No assistance needed    Home Safety:  No safety concerns identified    Hearing Impairment:  Need to ask people to speak up or repeat themselves and find that men's voices are easier to understand than woman's    In the past 6 months, have you been bothered by leaking of urine?  No    In general, how would you rate your overall mental or emotional health?  Good    Additional concerns today:  No      Today's PHQ-2 Score:       11/2/2023     8:45 AM   PHQ-2 ( 1999 Pfizer)   Q1: Little interest or pleasure in doing things 0   Q2: Feeling down, depressed or hopeless 0   PHQ-2 Score 0   Q1: Little interest or pleasure in doing things Not at all   Q2: Feeling down, depressed or hopeless Not at all   PHQ-2 Score 0           Have you ever done Advance Care Planning? (For example, a Health Directive, POLST, or a discussion with a medical provider or your loved ones about your wishes): Yes, advance care planning is on file.       Fall risk  Fallen 2 or more times in the past year?: No  Any fall with injury in the past year?: No    Cognitive Screening   1) Repeat 3 items (Leader, Season, Table)    2) Clock draw: NORMAL  3) 3 item recall: Recalls 3 objects  Results: 3 items recalled: COGNITIVE IMPAIRMENT LESS LIKELY    Mini-CogTM Copyright MERVAT Garcia. Licensed by " the author for use in [facility]; reprinted with permission (tom@[facility]). All rights reserved.      Do you have sleep apnea, excessive snoring or daytime drowsiness? : no    Reviewed and updated as needed this visit by clinical staff   Tobacco  Allergies  Meds              Reviewed and updated as needed this visit by Provider                 Social History     Tobacco Use    Smoking status: Former    Smokeless tobacco: Never   Substance Use Topics    Alcohol use: Yes     Alcohol/week: 1.0 standard drink of alcohol     Comment: Alcoholic Drinks/day: rare - only in [place]             11/2/2023     8:44 AM   Alcohol Use   Prescreen: >3 drinks/day or >7 drinks/week? No     Do you have a current opioid prescription? No  Do you use any other controlled substances or medications that are not prescribed by a provider? None    Current providers sharing in care for this patient include:   Patient Care Team:  Luis Elena MD as PCP - General  Luis Elena MD as Assigned PCP  Loyda Contreras MD as Assigned Heart and Vascular Provider    The following health maintenance items are reviewed in Epic and correct as of today:  Health Maintenance   Topic Date Due    HF ACTION PLAN  Never done    RSV VACCINE 60+ (1 - 1-dose 60+ series) Never done    ZOSTER IMMUNIZATION (2 of 2) 12/01/2016    INFLUENZA VACCINE (1) 09/01/2023    COVID-19 Vaccine (6 - 2023-24 season) 09/01/2023    ALT  11/01/2023    BMP  11/01/2023    LIPID  11/01/2023    ANNUAL REVIEW OF HM ORDERS  11/01/2023    MEDICARE ANNUAL WELLNESS VISIT  11/01/2023    CBC  05/01/2024    FALL RISK ASSESSMENT  11/02/2024    DTAP/TDAP/TD IMMUNIZATION (2 - Td or Tdap) 10/06/2026    ADVANCE CARE PLANNING  11/01/2027    TSH W/FREE T4 REFLEX  Completed    PHQ-2 (once per calendar year)  Completed    Pneumococcal Vaccine: 65+ Years  Completed    IPV IMMUNIZATION  Aged Out    HPV IMMUNIZATION  Aged Out    MENINGITIS IMMUNIZATION  Aged Out    RSV  "MONOCLONAL ANTIBODY  Aged Out       Review of Systems   Constitutional:  Negative for chills and fever.   HENT:  Positive for hearing loss. Negative for congestion, ear pain and sore throat.    Eyes:  Negative for pain and visual disturbance.   Respiratory:  Negative for cough and shortness of breath.    Cardiovascular:  Positive for peripheral edema. Negative for chest pain and palpitations.   Gastrointestinal:  Negative for abdominal pain, constipation, diarrhea, heartburn, hematochezia and nausea.   Genitourinary:  Negative for dysuria, frequency, genital sores, hematuria, impotence, penile discharge and urgency.   Musculoskeletal:  Positive for myalgias.   Skin:  Negative for rash.   Neurological:  Negative for dizziness, weakness, headaches and paresthesias.   Psychiatric/Behavioral:  Negative for mood changes. The patient is not nervous/anxious.        OBJECTIVE:   /76 (BP Location: Left arm, Patient Position: Standing, Cuff Size: Adult Large)   Pulse 94   Temp 97.8  F (36.6  C) (Tympanic)   Resp 16   Ht 1.791 m (5' 10.51\")   Wt 126.6 kg (279 lb)   SpO2 97%   BMI 39.45 kg/m   Estimated body mass index is 39.45 kg/m  as calculated from the following:    Height as of this encounter: 1.791 m (5' 10.51\").    Weight as of this encounter: 126.6 kg (279 lb).  Physical Exam  EYES: Eyelids, conjunctiva, and sclera were normal. Pupils were normal. Cornea, iris, and lens were normal bilaterally.  HEAD, EARS, NOSE, MOUTH, AND THROAT: Head and face were normal. Hearing was normal to voice and the ears were normal to external exam. Nose appearance was normal and there was no discharge.  NECK: Neck appearance was normal.  RESPIRATORY: Breathing pattern was normal and the chest moved symmetrically.  Percussion/auscultatory percussion was normal.  Lung sounds were normal and there were no abnormal sounds.  CARDIOVASCULAR: Heart rate and rhythm were normal.  S1 and S2 were normal and there were no extra sounds or " murmurs. Peripheral pulses in arms and legs were normal.  Jugular venous pressure was normal.   GASTROINTESTINAL: The abdomen was normal in contour.    NEUROLOGIC: The patient was alert and oriented to person, place, time, and circumstance. Speech was normal. Cranial nerves were normal. Motor strength was normal for age. The patient was normally coordinated.  PSYCHIATRIC:  Mood and affect were normal and the patient had normal recent and remote memory. The patient's judgment and insight were normal.    ASSESSMENT / PLAN:   1. Annual physical exam  This is an 83-year-old man with issues as discussed below    2. Chronic HFrEF (heart failure with reduced ejection fraction) (H)  Appears euvolemic, continue metoprolol, not on ACE inhibitor or ARB secondary to angioedema.    3. Chronic coronary artery disease  Continue secondary prevention    4. ICD (implantable cardioverter-defibrillator), single, in situ  5. Paroxysmal ventricular tachycardia (H)  Follows with cardiology has seen EP after arrest with defibrillator firing    6. Hyperlipidemia with target low density lipoprotein (LDL) cholesterol less than 70 mg/dL  Continue simvastatin  - CBC with platelets; Future  - Comprehensive metabolic panel; Future  - Lipid panel reflex to direct LDL Fasting; Future  - TSH with free T4 reflex; Future  - UA Macroscopic with reflex to Microscopic and Culture - Lab Collect; Future  - CBC with platelets  - Comprehensive metabolic panel  - Lipid panel reflex to direct LDL Fasting  - TSH with free T4 reflex  - UA Macroscopic with reflex to Microscopic and Culture - Lab Collect  - UA Microscopic with Reflex to Culture    7. Primary hypertension  Controlled continue same    8. FLO - did not tolerate CPAP    9. Primary osteoarthritis involving multiple joints  Stable acetaminophen as needed    10. Thrombocytopenia (H24)  Stable    11. Gastroesophageal reflux disease without esophagitis  Continue famotidine    12. Angioedema, sequela  Has  "done well since stopping ACE inhibitor or ARB and aspirin    13. Lumbar back pain with radiculopathy affecting right lower extremity  14. Polyneuropathy  Went to the ER with back pain with radiculopathy reviewed his CT scans.  At this point he can continue to manage as he is doing discussed spine care evaluation and physical therapy which she defers at this time  - Vitamin B12; Future  - Vitamin B6; Future  - Vitamin B12  - Vitamin B6    15. Hyperglycemia  - Hemoglobin A1c; Future  - Hemoglobin A1c    16. DNR (do not resuscitate)    17. Morbid obesity (H)  Patient has been advised of split billing requirements and indicates understanding: Yes  COUNSELING:  Reviewed preventive health counseling, as reflected in patient instructions  BMI:   Estimated body mass index is 39.45 kg/m  as calculated from the following:    Height as of this encounter: 1.791 m (5' 10.51\").    Weight as of this encounter: 126.6 kg (279 lb).   Weight management plan: Discussed healthy diet and exercise guidelines      He reports that he has quit smoking. He has never used smokeless tobacco.      Appropriate preventive services were discussed with this patient, including applicable screening as appropriate for fall prevention, nutrition, physical activity, Tobacco-use cessation, weight loss and cognition.  Checklist reviewing preventive services available has been given to the patient.    Reviewed patients plan of care and provided an AVS. The Basic Care Plan (routine screening as documented in Health Maintenance) for Chaim meets the Care Plan requirement. This Care Plan has been established and reviewed with the Patient.          Luis Elena MD  [facility]    Identified Health Risks:  I have reviewed Opioid Use Disorder and Substance Use Disorder risk factors and made any needed referrals.   Answers submitted by the patient for this visit:  Annual Preventive Visit (Submitted on 11/2/2023)  Chief Complaint: " "Annual Exam:  In general, how would you rate your overall physical health?: good  Frequency of exercise:: 1 day/week  Do you usually eat at least 4 servings of fruit and vegetables a day, include whole grains & fiber, and avoid regularly eating high fat or \"junk\" foods? : Yes  Taking medications regularly:: Yes  Medication side effects:: None  Activities of Daily Living: no assistance needed  Home safety: no safety concerns identified  Hearing Impairment:: need to ask people to speak up or repeat themselves, find that men's voices are easier to understand than woman's  In the past 6 months, have you been bothered by leaking of urine?: No  abdominal pain: No  Blood in stool: No  Blood in urine: No  chest pain: No  chills: No  congestion: No  constipation: No  cough: No  diarrhea: No  dizziness: No  ear pain: No  eye pain: No  nervous/anxious: No  fever: No  frequency: No  genital sores: No  headaches: No  hearing loss: Yes  heartburn: No  peripheral edema: Yes  mood changes: No  myalgias: Yes  nausea: No  dysuria: No  palpitations: No  Skin sensation changes: No  sore throat: No  urgency: No  rash: No  shortness of breath: No  visual disturbance: No  weakness: No  impotence: No  penile discharge: No  In general, how would you rate your overall mental or emotional health?: good  Additional concerns today:: No  Exercise outside of work (Submitted on 11/2/2023)  Chief Complaint: Annual Exam:  Duration of exercise:: Less than 15 minutes    "

## 2023-11-02 NOTE — COMMUNITY RESOURCES LIST (ENGLISH)
11/02/2023   [facility]  N/A  For questions about this resource list or additional care needs, please contact your primary care clinic or care manager.  Phone: [phone]   Email: N/A   Address: [facility] [place] [address]   Hours: N/A        Hotlines and Helplines       Hotline - Housing crisis  1  [facility] Distance: 5.63 miles      Phone/Virtual   [address] [place] [address]  Language: English  Hours: Mon - Sun Open 24 Hours   Phone: (712) 731-1964 Email: communications@[facility].org Website: https://[facility].org/[facility]aviourshousing/     2  [facility] (Main Office) - Emergency Services Distance: 6.81 miles      Phone/Virtual   [address] [place] [address]  Language: English  Hours: Mon - Sun Open 24 Hours   Phone: (457) 585-8928 Email: info@[facility].org Website: http://[facility].org          Housing       Coordinated Entry access point  3  [facility] - Coordinated Access to Housing and Shelter (CAHS) - Coordinated Access Distance: 2.07 miles      In-Person, Phone/Virtual   [address] [place] [address]  Language: English  Hours: Mon - Fri 8:00 AM - 4:30 PM  Fees: Free   Phone: (927) 385-1728 Website: https://www.[facility]./residents/assistance-support/assistance/housing-services-support     4  [facility] Distance: 3.17 miles      In-Person, Phone/Virtual   [address] [place] [address]  Language: English, Ecuadorean  Hours: Mon - Fri 8:30 AM - 4:30 PM  Fees: Free   Phone: (122) 186-2041 Email: info@[facility]care.org Website: https://www.[facility].org/locations/[facility]/     Drop-in center or day shelter  5  [facility] Distance: 4.72 miles      In-Person   [address] [place] [address]  Language: English  Hours: Mon - Fri 9:00 AM - 4:00 PM  Fees: Free   Phone: (499) 344-7363 Email: [facility].org  Website: http://listeninghouse.org     6  [facility] Distance: 5.86 miles      In-Person   [address] [place] [address]  Language: English, Eritrean, Tajik  Hours: Mon - Sat 7:00 AM - 3:00 PM  Fees: Free, Self Pay   Phone: (107) 681-1102 Email: info@[employer] Website: https://www.[employer]/locations/opportunity-center/     Housing search assistance  7  [facility] - Online Housing Search Assistance Distance: 1.09 miles      Phone/Virtual   [address] [place] [address]  Language: English  Hours: Mon - Sun Open 24 Hours  Fees: Free   Phone: (698) 946-3409 Email: david@[employer] Website: https://[employer]/     8  [facility] - Mental Health Crisis Housing Search Assistance Distance: 2.49 miles      In-Person, Phone/Virtual   [address] [facility] [address] [place] [address]  Language: English  Hours: Mon - Tue 8:00 AM - 4:30 PM , Wed 8:00 AM - 6:00 PM , Thu - Fri 8:00 AM - 4:30 PM  Fees: Free   Phone: (292) 581-9968 Email: [facility]. Website: https://www.[facility]./residents/health-medical/clinics-services/mental-health/adult-mental-health     Shelter for families  9  [facility] Distance: 17.98 miles      In-Person   [address] [place] [address]  Language: English  Hours: Mon - Fri 3:00 PM - 9:00 AM , Sat - Sun Open 24 Hours  Fees: Free   Phone: (512) 675-4162 Ext.1 Website: https://www.[facility].org/2020/07/03/emergency-family-shelter/     Shelter for individuals  10  [facility] [place] [facility] - [facility] [place] [facility] Distance: 3.12 miles      In-Person   [address] [place] [address]  Language: English  Hours: Mon - Sun 5:00 PM - 10:00 AM  Fees: Free, Self Pay   Phone: (411) 141-6024 Email: info@[facility].org Website:  https://www.[facility].org/locations/[facility]/     11  [facility] Distance: 5.63 miles      In-Person   [address] [place] [address]  Language: English  Hours: Mon - Sun Open 24 Hours  Fees: Free   Phone: (613) 477-6542 Email: communications@[facility].org Website: https://[facility].org/oursaviourshousing/          Important Numbers & Websites       Emergency Services   [address]  [place] [address]  Poison Control   (841) 201-7802  Suicide Prevention Lifeline   (264) 702-2650 (TALK)  Child Abuse Hotline   (973) 995-8795 (4-A-Child)  Sexual Assault Hotline   (945) 463-1936 (HOPE)  National Runaway Safeline   (707) 303-1803 (RUNAWAY)  All-Options Talkline   (914) 651-9172  Substance Abuse Referral   (161) 840-9500 (HELP)

## 2023-11-02 NOTE — COMMUNITY RESOURCES LIST (ENGLISH)
11/02/2023   [facility]  N[facility]A  For questions about this resource list or additional care needs, please contact your primary care clinic or care manager.  Phone: [phone]   Email: N/A   [facility] [place] [address]   Hours: N/A        Hotlines and Helplines       Hotline - Housing crisis  1  [facility] Distance: 5.63 miles      Phone/Virtual   [address] [place] [address]  Language: English  Hours: Mon - Sun Open 24 Hours   Phone: (509) 937-8771 Email: communications@[facility].org Website: https://[facility].org/oursaviourshousing/     2  [facility] (Main Office) - Emergency Services Distance: 6.81 miles      Phone/Virtual   [address] [place] [address]  Language: English  Hours: Mon - Sun Open 24 Hours   Phone: (984) 889-9257 Email: info@[facility].org Website: http://[facility].org          Housing       Coordinated Entry access point  3  [facility] - Coordinated Access to Housing and Shelter (CAHS) - Coordinated Access Distance: 2.07 miles      In-Person, Phone/Virtual   [address] [place] [address]  Language: English  Hours: Mon - Fri 8:00 AM - 4:30 PM  Fees: Free   Phone: (127) 670-4836 Website: https://www.[facility]./residents/assistance-support/assistance/housing-services-support     4  [facility] Distance: 3.17 miles      In-Person, Phone/Virtual   [address] [place] [address]  Language: English, Bhutanese  Hours: Mon - Fri 8:30 AM - 4:30 PM  Fees: Free   Phone: (925) 115-8642 Email: info@[facility].org Website: https://www.[facility].org/locations/[facility]-clinic/     Drop-in center or day shelter  5  [facility] Distance: 4.72 miles      In-Person   [address] [place] [address]  Language: English  Hours: Mon - Fri 9:00 AM - 4:00 PM  Fees: Free   Phone: (502) 369-4428 Email: [facility].org  Website: http://listeninghouse.org     6  [facility] [place] [facility] Distance: 5.86 miles      In-Person   [address] [place] [address]  Language: English, Kosovan, Khmer  Hours: Mon - Sat 7:00 AM - 3:00 PM  Fees: Free, Self Pay   Phone: (104) 736-7017 Email: info@[employer] Website: https://www.[employer]/locations/opportunity-center/     Housing search assistance  7  [facility] - Online Housing Search Assistance Distance: 1.09 miles      Phone/Virtual   [address] [place] [address]  Language: English  Hours: Mon - Sun Open 24 Hours  Fees: Free   Phone: (399) 564-4345 Email: david@[employer] Website: https://[employer]/     8  [facility] - Mental Health Crisis Housing Search Assistance Distance: 2.49 miles      In-Person, Phone/Virtual   [address] [facility] [address] [place] [address]  Language: English  Hours: Mon - Tue 8:00 AM - 4:30 PM , Wed 8:00 AM - 6:00 PM , Thu - Fri 8:00 AM - 4:30 PM  Fees: Free   Phone: (518) 858-1569 Email: [facility]. Website: https://www.[facility]./residents/health-medical/clinics-services/mental-health/adult-mental-health     Shelter for families  9  [facility] Distance: 17.98 miles      In-Person   [address] [place] [address]  Language: English  Hours: Mon - Fri 3:00 PM - 9:00 AM , Sat - Sun Open 24 Hours  Fees: Free   Phone: (505) 247-3256 Ext.1 Website: https://www.[facility].org/2020/07/03/emergency-family-shelter/     Shelter for individuals  10  [facility] [place] [facility] [place] [facility] - [facility] [place] [facility] Distance: 3.12 miles      In-Person   [address] [place] [address]  Language: English  Hours: Mon - Sun 5:00 PM - 10:00 AM  Fees: Free, Self Pay   Phone: (550) 674-9043 Email: info@[employer].org Website:  https://www.[facility].org/locations/[facility]/     11  [facility] Distance: 5.63 miles      In-Person   [address] [place] [address]  Language: English  Hours: Mon - Sun Open 24 Hours  Fees: Free   Phone: (659) 794-7885 Email: communications@[facility].org Website: https://[facility].org/oursaviourshousing/          Important Numbers & Websites       Emergency Services   [address]  [place] [address]  Poison Control   (756) 784-1870  Suicide Prevention Lifeline   (716) 611-2844 (TALK)  Child Abuse Hotline   (502) 507-8033 (4-A-Child)  Sexual Assault Hotline   (574) 739-5987 (HOPE)  National Runaway Safeline   (705) 909-6808 (RUNAWAY)  All-Options Talkline   (271) 491-2731  Substance Abuse Referral   (804) 217-3153 (HELP)

## 2023-11-05 LAB — PYRIDOXAL PHOS SERPL-SCNC: 22.8 NMOL/L

## 2023-11-07 ENCOUNTER — ANCILLARY PROCEDURE (OUTPATIENT)
Dept: CARDIOLOGY | Facility: CLINIC | Age: 83
End: 2023-11-07
Attending: INTERNAL MEDICINE
Payer: MEDICARE

## 2023-11-07 DIAGNOSIS — Z95.810 ICD (IMPLANTABLE CARDIOVERTER-DEFIBRILLATOR) IN PLACE: ICD-10-CM

## 2023-11-07 DIAGNOSIS — I25.5 ISCHEMIC CARDIOMYOPATHY: ICD-10-CM

## 2023-11-07 LAB
MDC_IDC_EPISODE_DTM: NORMAL
MDC_IDC_EPISODE_DURATION: 2 S
MDC_IDC_EPISODE_ID: 101
MDC_IDC_EPISODE_TYPE: NORMAL
MDC_IDC_LEAD_CONNECTION_STATUS: NORMAL
MDC_IDC_LEAD_IMPLANT_DT: NORMAL
MDC_IDC_LEAD_LOCATION: NORMAL
MDC_IDC_LEAD_LOCATION_DETAIL_1: NORMAL
MDC_IDC_LEAD_MFG: NORMAL
MDC_IDC_LEAD_MODEL: NORMAL
MDC_IDC_LEAD_POLARITY_TYPE: NORMAL
MDC_IDC_LEAD_SERIAL: NORMAL
MDC_IDC_LEAD_SPECIAL_FUNCTION: NORMAL
MDC_IDC_MSMT_BATTERY_DTM: NORMAL
MDC_IDC_MSMT_BATTERY_REMAINING_LONGEVITY: 44 MO
MDC_IDC_MSMT_BATTERY_RRT_TRIGGER: 2.73
MDC_IDC_MSMT_BATTERY_STATUS: NORMAL
MDC_IDC_MSMT_BATTERY_VOLTAGE: 2.98 V
MDC_IDC_MSMT_CAP_CHARGE_DTM: NORMAL
MDC_IDC_MSMT_CAP_CHARGE_ENERGY: 18 J
MDC_IDC_MSMT_CAP_CHARGE_TIME: 4.13
MDC_IDC_MSMT_CAP_CHARGE_TYPE: NORMAL
MDC_IDC_MSMT_LEADCHNL_RV_IMPEDANCE_VALUE: 304 OHM
MDC_IDC_MSMT_LEADCHNL_RV_IMPEDANCE_VALUE: 342 OHM
MDC_IDC_MSMT_LEADCHNL_RV_PACING_THRESHOLD_AMPLITUDE: 0.62 V
MDC_IDC_MSMT_LEADCHNL_RV_PACING_THRESHOLD_PULSEWIDTH: 0.4 MS
MDC_IDC_MSMT_LEADCHNL_RV_SENSING_INTR_AMPL: 5.12 MV
MDC_IDC_MSMT_LEADCHNL_RV_SENSING_INTR_AMPL: 5.12 MV
MDC_IDC_PG_IMPLANT_DTM: NORMAL
MDC_IDC_PG_MFG: NORMAL
MDC_IDC_PG_MODEL: NORMAL
MDC_IDC_PG_SERIAL: NORMAL
MDC_IDC_PG_TYPE: NORMAL
MDC_IDC_SESS_CLINIC_NAME: NORMAL
MDC_IDC_SESS_DTM: NORMAL
MDC_IDC_SESS_TYPE: NORMAL
MDC_IDC_SET_BRADY_HYSTRATE: NORMAL
MDC_IDC_SET_BRADY_LOWRATE: 40 {BEATS}/MIN
MDC_IDC_SET_BRADY_MODE: NORMAL
MDC_IDC_SET_LEADCHNL_RV_PACING_AMPLITUDE: 1.5 V
MDC_IDC_SET_LEADCHNL_RV_PACING_ANODE_ELECTRODE_1: NORMAL
MDC_IDC_SET_LEADCHNL_RV_PACING_ANODE_LOCATION_1: NORMAL
MDC_IDC_SET_LEADCHNL_RV_PACING_CAPTURE_MODE: NORMAL
MDC_IDC_SET_LEADCHNL_RV_PACING_CATHODE_ELECTRODE_1: NORMAL
MDC_IDC_SET_LEADCHNL_RV_PACING_CATHODE_LOCATION_1: NORMAL
MDC_IDC_SET_LEADCHNL_RV_PACING_POLARITY: NORMAL
MDC_IDC_SET_LEADCHNL_RV_PACING_PULSEWIDTH: 0.4 MS
MDC_IDC_SET_LEADCHNL_RV_SENSING_ANODE_ELECTRODE_1: NORMAL
MDC_IDC_SET_LEADCHNL_RV_SENSING_ANODE_LOCATION_1: NORMAL
MDC_IDC_SET_LEADCHNL_RV_SENSING_CATHODE_ELECTRODE_1: NORMAL
MDC_IDC_SET_LEADCHNL_RV_SENSING_CATHODE_LOCATION_1: NORMAL
MDC_IDC_SET_LEADCHNL_RV_SENSING_POLARITY: NORMAL
MDC_IDC_SET_LEADCHNL_RV_SENSING_SENSITIVITY: 0.3 MV
MDC_IDC_SET_ZONE_DETECTION_BEATS_DENOMINATOR: 16 {BEATS}
MDC_IDC_SET_ZONE_DETECTION_BEATS_DENOMINATOR: 28 {BEATS}
MDC_IDC_SET_ZONE_DETECTION_BEATS_DENOMINATOR: 32 {BEATS}
MDC_IDC_SET_ZONE_DETECTION_BEATS_NUMERATOR: 16 {BEATS}
MDC_IDC_SET_ZONE_DETECTION_BEATS_NUMERATOR: 24 {BEATS}
MDC_IDC_SET_ZONE_DETECTION_BEATS_NUMERATOR: 28 {BEATS}
MDC_IDC_SET_ZONE_DETECTION_INTERVAL: 240 MS
MDC_IDC_SET_ZONE_DETECTION_INTERVAL: 320 MS
MDC_IDC_SET_ZONE_DETECTION_INTERVAL: 360 MS
MDC_IDC_SET_ZONE_DETECTION_INTERVAL: 400 MS
MDC_IDC_SET_ZONE_STATUS: NORMAL
MDC_IDC_SET_ZONE_TYPE: NORMAL
MDC_IDC_SET_ZONE_VENDOR_TYPE: NORMAL
MDC_IDC_STAT_BRADY_DTM_END: NORMAL
MDC_IDC_STAT_BRADY_DTM_START: NORMAL
MDC_IDC_STAT_BRADY_RV_PERCENT_PACED: 0.19 %
MDC_IDC_STAT_EPISODE_RECENT_COUNT: 0
MDC_IDC_STAT_EPISODE_RECENT_COUNT: 1
MDC_IDC_STAT_EPISODE_RECENT_COUNT_DTM_END: NORMAL
MDC_IDC_STAT_EPISODE_RECENT_COUNT_DTM_START: NORMAL
MDC_IDC_STAT_EPISODE_TOTAL_COUNT: 0
MDC_IDC_STAT_EPISODE_TOTAL_COUNT: 1
MDC_IDC_STAT_EPISODE_TOTAL_COUNT: 3
MDC_IDC_STAT_EPISODE_TOTAL_COUNT: 95
MDC_IDC_STAT_EPISODE_TOTAL_COUNT_DTM_END: NORMAL
MDC_IDC_STAT_EPISODE_TOTAL_COUNT_DTM_START: NORMAL
MDC_IDC_STAT_EPISODE_TYPE: NORMAL
MDC_IDC_STAT_TACHYTHERAPY_ATP_DELIVERED_RECENT: 0
MDC_IDC_STAT_TACHYTHERAPY_ATP_DELIVERED_TOTAL: 0
MDC_IDC_STAT_TACHYTHERAPY_RECENT_DTM_END: NORMAL
MDC_IDC_STAT_TACHYTHERAPY_RECENT_DTM_START: NORMAL
MDC_IDC_STAT_TACHYTHERAPY_SHOCKS_ABORTED_RECENT: 0
MDC_IDC_STAT_TACHYTHERAPY_SHOCKS_ABORTED_TOTAL: 0
MDC_IDC_STAT_TACHYTHERAPY_SHOCKS_DELIVERED_RECENT: 0
MDC_IDC_STAT_TACHYTHERAPY_SHOCKS_DELIVERED_TOTAL: 1
MDC_IDC_STAT_TACHYTHERAPY_TOTAL_DTM_END: NORMAL
MDC_IDC_STAT_TACHYTHERAPY_TOTAL_DTM_START: NORMAL

## 2023-11-07 PROCEDURE — 93296 REM INTERROG EVL PM/IDS: CPT | Performed by: INTERNAL MEDICINE

## 2023-11-07 PROCEDURE — 93295 DEV INTERROG REMOTE 1/2/MLT: CPT | Performed by: INTERNAL MEDICINE

## 2023-12-24 DIAGNOSIS — E78.2 MIXED HYPERLIPIDEMIA: ICD-10-CM

## 2023-12-27 RX ORDER — SIMVASTATIN 40 MG
40 TABLET ORAL AT BEDTIME
Qty: 90 TABLET | Refills: 0 | Status: SHIPPED | OUTPATIENT
Start: 2023-12-27 | End: 2024-04-02

## 2024-01-03 DIAGNOSIS — I25.10 CHRONIC CORONARY ARTERY DISEASE: ICD-10-CM

## 2024-01-04 RX ORDER — CLOPIDOGREL BISULFATE 75 MG/1
75 TABLET ORAL DAILY
Qty: 90 TABLET | Refills: 1 | Status: SHIPPED | OUTPATIENT
Start: 2024-01-04 | End: 2024-07-02

## 2024-02-19 ENCOUNTER — ANCILLARY PROCEDURE (OUTPATIENT)
Dept: CARDIOLOGY | Facility: CLINIC | Age: 84
End: 2024-02-19
Attending: INTERNAL MEDICINE
Payer: MEDICARE

## 2024-02-19 DIAGNOSIS — I25.5 ISCHEMIC CARDIOMYOPATHY: ICD-10-CM

## 2024-02-19 DIAGNOSIS — Z95.810 ICD (IMPLANTABLE CARDIOVERTER-DEFIBRILLATOR) IN PLACE: ICD-10-CM

## 2024-02-19 LAB
MDC_IDC_EPISODE_DTM: NORMAL
MDC_IDC_EPISODE_DURATION: 1 S
MDC_IDC_EPISODE_DURATION: 1 S
MDC_IDC_EPISODE_DURATION: 2 S
MDC_IDC_EPISODE_DURATION: 2 S
MDC_IDC_EPISODE_ID: 102
MDC_IDC_EPISODE_ID: 103
MDC_IDC_EPISODE_ID: 104
MDC_IDC_EPISODE_ID: 105
MDC_IDC_EPISODE_TYPE: NORMAL
MDC_IDC_LEAD_CONNECTION_STATUS: NORMAL
MDC_IDC_LEAD_IMPLANT_DT: NORMAL
MDC_IDC_LEAD_LOCATION: NORMAL
MDC_IDC_LEAD_LOCATION_DETAIL_1: NORMAL
MDC_IDC_LEAD_MFG: NORMAL
MDC_IDC_LEAD_MODEL: NORMAL
MDC_IDC_LEAD_POLARITY_TYPE: NORMAL
MDC_IDC_LEAD_SERIAL: NORMAL
MDC_IDC_LEAD_SPECIAL_FUNCTION: NORMAL
MDC_IDC_MSMT_BATTERY_DTM: NORMAL
MDC_IDC_MSMT_BATTERY_REMAINING_LONGEVITY: 42 MO
MDC_IDC_MSMT_BATTERY_RRT_TRIGGER: 2.73
MDC_IDC_MSMT_BATTERY_STATUS: NORMAL
MDC_IDC_MSMT_BATTERY_VOLTAGE: 2.98 V
MDC_IDC_MSMT_CAP_CHARGE_DTM: NORMAL
MDC_IDC_MSMT_CAP_CHARGE_ENERGY: 18 J
MDC_IDC_MSMT_CAP_CHARGE_TIME: 4.11
MDC_IDC_MSMT_CAP_CHARGE_TYPE: NORMAL
MDC_IDC_MSMT_LEADCHNL_RV_IMPEDANCE_VALUE: 247 OHM
MDC_IDC_MSMT_LEADCHNL_RV_IMPEDANCE_VALUE: 323 OHM
MDC_IDC_MSMT_LEADCHNL_RV_PACING_THRESHOLD_AMPLITUDE: 0.75 V
MDC_IDC_MSMT_LEADCHNL_RV_PACING_THRESHOLD_PULSEWIDTH: 0.4 MS
MDC_IDC_MSMT_LEADCHNL_RV_SENSING_INTR_AMPL: 4.62 MV
MDC_IDC_MSMT_LEADCHNL_RV_SENSING_INTR_AMPL: 4.62 MV
MDC_IDC_PG_IMPLANT_DTM: NORMAL
MDC_IDC_PG_MFG: NORMAL
MDC_IDC_PG_MODEL: NORMAL
MDC_IDC_PG_SERIAL: NORMAL
MDC_IDC_PG_TYPE: NORMAL
MDC_IDC_SESS_CLINIC_NAME: NORMAL
MDC_IDC_SESS_DTM: NORMAL
MDC_IDC_SESS_TYPE: NORMAL
MDC_IDC_SET_BRADY_HYSTRATE: NORMAL
MDC_IDC_SET_BRADY_LOWRATE: 40 {BEATS}/MIN
MDC_IDC_SET_BRADY_MODE: NORMAL
MDC_IDC_SET_LEADCHNL_RV_PACING_AMPLITUDE: 1.5 V
MDC_IDC_SET_LEADCHNL_RV_PACING_ANODE_ELECTRODE_1: NORMAL
MDC_IDC_SET_LEADCHNL_RV_PACING_ANODE_LOCATION_1: NORMAL
MDC_IDC_SET_LEADCHNL_RV_PACING_CAPTURE_MODE: NORMAL
MDC_IDC_SET_LEADCHNL_RV_PACING_CATHODE_ELECTRODE_1: NORMAL
MDC_IDC_SET_LEADCHNL_RV_PACING_CATHODE_LOCATION_1: NORMAL
MDC_IDC_SET_LEADCHNL_RV_PACING_POLARITY: NORMAL
MDC_IDC_SET_LEADCHNL_RV_PACING_PULSEWIDTH: 0.4 MS
MDC_IDC_SET_LEADCHNL_RV_SENSING_ANODE_ELECTRODE_1: NORMAL
MDC_IDC_SET_LEADCHNL_RV_SENSING_ANODE_LOCATION_1: NORMAL
MDC_IDC_SET_LEADCHNL_RV_SENSING_CATHODE_ELECTRODE_1: NORMAL
MDC_IDC_SET_LEADCHNL_RV_SENSING_CATHODE_LOCATION_1: NORMAL
MDC_IDC_SET_LEADCHNL_RV_SENSING_POLARITY: NORMAL
MDC_IDC_SET_LEADCHNL_RV_SENSING_SENSITIVITY: 0.3 MV
MDC_IDC_SET_ZONE_DETECTION_BEATS_DENOMINATOR: 16 {BEATS}
MDC_IDC_SET_ZONE_DETECTION_BEATS_DENOMINATOR: 28 {BEATS}
MDC_IDC_SET_ZONE_DETECTION_BEATS_DENOMINATOR: 32 {BEATS}
MDC_IDC_SET_ZONE_DETECTION_BEATS_NUMERATOR: 16 {BEATS}
MDC_IDC_SET_ZONE_DETECTION_BEATS_NUMERATOR: 24 {BEATS}
MDC_IDC_SET_ZONE_DETECTION_BEATS_NUMERATOR: 28 {BEATS}
MDC_IDC_SET_ZONE_DETECTION_INTERVAL: 240 MS
MDC_IDC_SET_ZONE_DETECTION_INTERVAL: 320 MS
MDC_IDC_SET_ZONE_DETECTION_INTERVAL: 360 MS
MDC_IDC_SET_ZONE_DETECTION_INTERVAL: 400 MS
MDC_IDC_SET_ZONE_STATUS: NORMAL
MDC_IDC_SET_ZONE_TYPE: NORMAL
MDC_IDC_SET_ZONE_VENDOR_TYPE: NORMAL
MDC_IDC_STAT_BRADY_DTM_END: NORMAL
MDC_IDC_STAT_BRADY_DTM_START: NORMAL
MDC_IDC_STAT_BRADY_RV_PERCENT_PACED: 0.12 %
MDC_IDC_STAT_EPISODE_RECENT_COUNT: 0
MDC_IDC_STAT_EPISODE_RECENT_COUNT: 4
MDC_IDC_STAT_EPISODE_RECENT_COUNT_DTM_END: NORMAL
MDC_IDC_STAT_EPISODE_RECENT_COUNT_DTM_START: NORMAL
MDC_IDC_STAT_EPISODE_TOTAL_COUNT: 0
MDC_IDC_STAT_EPISODE_TOTAL_COUNT: 1
MDC_IDC_STAT_EPISODE_TOTAL_COUNT: 3
MDC_IDC_STAT_EPISODE_TOTAL_COUNT: 99
MDC_IDC_STAT_EPISODE_TOTAL_COUNT_DTM_END: NORMAL
MDC_IDC_STAT_EPISODE_TOTAL_COUNT_DTM_START: NORMAL
MDC_IDC_STAT_EPISODE_TYPE: NORMAL
MDC_IDC_STAT_TACHYTHERAPY_ATP_DELIVERED_RECENT: 0
MDC_IDC_STAT_TACHYTHERAPY_ATP_DELIVERED_TOTAL: 0
MDC_IDC_STAT_TACHYTHERAPY_RECENT_DTM_END: NORMAL
MDC_IDC_STAT_TACHYTHERAPY_RECENT_DTM_START: NORMAL
MDC_IDC_STAT_TACHYTHERAPY_SHOCKS_ABORTED_RECENT: 0
MDC_IDC_STAT_TACHYTHERAPY_SHOCKS_ABORTED_TOTAL: 0
MDC_IDC_STAT_TACHYTHERAPY_SHOCKS_DELIVERED_RECENT: 0
MDC_IDC_STAT_TACHYTHERAPY_SHOCKS_DELIVERED_TOTAL: 1
MDC_IDC_STAT_TACHYTHERAPY_TOTAL_DTM_END: NORMAL
MDC_IDC_STAT_TACHYTHERAPY_TOTAL_DTM_START: NORMAL

## 2024-02-19 PROCEDURE — 93296 REM INTERROG EVL PM/IDS: CPT | Performed by: INTERNAL MEDICINE

## 2024-02-19 PROCEDURE — 93295 DEV INTERROG REMOTE 1/2/MLT: CPT | Performed by: INTERNAL MEDICINE

## 2024-03-12 ENCOUNTER — OFFICE VISIT (OUTPATIENT)
Dept: CARDIOLOGY | Facility: CLINIC | Age: 84
End: 2024-03-12
Attending: INTERNAL MEDICINE
Payer: MEDICARE

## 2024-03-12 VITALS
HEART RATE: 72 BPM | RESPIRATION RATE: 14 BRPM | DIASTOLIC BLOOD PRESSURE: 68 MMHG | SYSTOLIC BLOOD PRESSURE: 130 MMHG | BODY MASS INDEX: 39.88 KG/M2 | WEIGHT: 282 LBS

## 2024-03-12 DIAGNOSIS — E78.5 DYSLIPIDEMIA: ICD-10-CM

## 2024-03-12 DIAGNOSIS — Z95.810 ICD (IMPLANTABLE CARDIOVERTER-DEFIBRILLATOR) IN PLACE: ICD-10-CM

## 2024-03-12 DIAGNOSIS — I10 HYPERTENSION, UNSPECIFIED TYPE: ICD-10-CM

## 2024-03-12 DIAGNOSIS — I47.29 NSVT (NONSUSTAINED VENTRICULAR TACHYCARDIA) (H): ICD-10-CM

## 2024-03-12 DIAGNOSIS — I25.5 ISCHEMIC CARDIOMYOPATHY: ICD-10-CM

## 2024-03-12 DIAGNOSIS — I25.10 CORONARY ARTERY DISEASE INVOLVING NATIVE CORONARY ARTERY OF NATIVE HEART WITHOUT ANGINA PECTORIS: Primary | ICD-10-CM

## 2024-03-12 PROCEDURE — 99214 OFFICE O/P EST MOD 30 MIN: CPT | Performed by: INTERNAL MEDICINE

## 2024-03-12 NOTE — LETTER
3/12/2024    Luis Elena MD  [address] [facility] [address]    RE: Chaim Dee       Dear Colleague,     I had the pleasure of seeing Chaim Dee in the [facility].         [facility]   [address], [place] [address]   [email or web address]   OFFICE: [phone]            Impression and Plan     1.  Coronary artery disease.  Chaim has known coronary artery disease.  Specifically, Chaim had suffered a remote anterior myocardial infarction in 1999 at which time he underwent stenting of the LAD and circumflex coronary arteries.  Chaim is on antiplatelet therapy with clopidogrel.  Continue clopidogrel.  Continue metoprolol succinate 100 mg daily.     2.  Cardiomyopathy.  Chaim in the past had been noted to have mild depression and left ventricular systolic performance.  Echocardiogram 14 January 2022 suggested ejection fraction of 55-60% (ejection fraction 44% by nuclear perfusion imaging study performed that same day on 14 January 2022).     He is subjectively & objectively has been doing well.  Continue metoprolol as per problem #1.     3.  NSVT.  Chaim underwent ICD implantation 12 February 2018 (Medtronic [id number] Evera XT VR device).  He has been followed intermittently through the Electrophysiology Subsection.    Continue metoprolol succinate 100 mg daily as per problem #1 & 2.     4.  Dyslipidemia.  Lipid profile 2 November 2023 revealed LDL 71 mg/dL and HDL 37 mg/dL.  Continue statin therapy.     Plan on follow-up in 1 year.  Chaim will also follow routinely through the Device Clinic per protocol.      35 minutes spent reviewing prior records (including documentation, laboratory studies, cardiac testing/imaging), interview with patient along with physical exam, planning, and subsequent documentation/crafting of note).           History of Present Illness    Once again I would like to thank you again for asking  me to participate in the care of your patient, Chaim Dee.  As you know, but to reiterate for my own records, Chaim Dee is a 83 year old male with known coronary artery disease.  Specifically, Chaim had suffered a remote anterior myocardial infarction in 1999 at which time he underwent stenting of the LAD and circumflex coronary arteries.     On interview today, Chaim is without specific cardiac complaint.  He reports no chest pain.  His breathing is comfortable.  His exercise tolerance is at his baseline.  No palpitations or lightheadedness.    Further review of systems is otherwise negative/noncontributory (medical record and 13 point review of systems reviewed as well and pertinent positives noted).         Cardiac Diagnostics      Echocardiogram 14 January 2022:  Normal left ventricular size and systolic performance with ejection fraction of 55-60%.  No significant valvular heart disease  Normal right ventricular size and systolic performance.  Normal atrial dimensions.    Regadenoson nuclear perfusion study 14 January 2022:  There is a large area of a severe degree of transmural infarction in the mid to distal anterior, apical, inferior, lateral and septal segment(s) of the left ventricle. No significant ischemia is identified.  The left ventricular ejection fraction at stress is 44%.    Device interrogation 6 April 2023 (Medtronic [id number] Evera XT VR device implanted 12 February 2018):  Encounter type: routine remote ICD transmission.  Device: Medtronic Evera  Pacing%/Programmed:  0.1% in VVI 40 ppm mode.  Lead(s): stable  Battery longevity: estimated 3.5 yrs  Presenting: ventricular sensing, appears normal sinus rhythm with rare PVC, rate 61 bpm.  Atrial high rates: n/a  Anticoagulant: none  Ventricular high rates: since 7 November 2023, four NSVT detected.  Comments: normal device function.     Device interrogation 6 April 2023 (Medtronic [id number] Evera XT VR device implanted 12  February 2018):  Encounter Type: Routine remote device check  Device: Medtronic Evera (S) ICD  Pacing %/Programmed:  0.1%, VVI 40bpm  Lead(s): Stable  Battery longevity: Estimating 4.1 years remaining  Presenting: VS 55bpm  Atrial arrhythmias: Since 31 October 2022, none detected  Anticoagulant: None  Ventricular arrhythmias: 1 VHR episode logged 15 December 2022, 15 beats NSVT 211bpm.  Device/Lead alerts: Potential for shortened RRT-to-EOL, measurements stable and patient able to hear Alert Tones.   Comments: Normal device function.           Physical Examination       /68 (BP Location: Left arm, Patient Position: Sitting, Cuff Size: Adult Large)   Pulse 72   Resp 14   Wt 127.9 kg (282 lb)   BMI 39.88 kg/m          Wt Readings from Last 3 Encounters:   03/12/24 127.9 kg (282 lb)   11/02/23 126.6 kg (279 lb)   05/04/23 126.7 kg (279 lb 6.4 oz)       The patient is alert and oriented times three. Sclerae are anicteric. Mucosal membranes are moist. Jugular venous pressure is normal. No significant adenopathy/thyromegally appreciated. Lungs are clear with good expansion. On cardiovascular exam, the patient has a regular S1 and S2. Abdomen is soft and non-tender. Extremities reveal no clubbing, cyanosis, or edema.       Family History/Social History/Risk Factors   Patient does not smoke.  Family history reviewed, and family history includes Cerebrovascular Disease in his father; Coronary Artery Disease in his brother; Other - See Comments in his mother.        Medical History  Surgical History Family History Social History   Past Medical History:   Diagnosis Date    Arthritis unknown    right knee    Cholecystitis     Coronary artery disease     had stents placed 08/08/1999    Hypercholesteremia     Hypertension     ICD (implantable cardioverter-defibrillator) in place     Myocardial infarction (H) 1999    Obesity     Scrotal swelling     Sleep apnea     can't tolerate cpap     Past Surgical History:    Procedure Laterality Date    CARDIAC DEFIBRILLATOR PLACEMENT  2015    CORONARY STENT PLACEMENT  08/08/1999    LAD and Circ    FRACTURE SURGERY      LAPAROSCOPIC CHOLECYSTECTOMY N/A 11/9/2018    Procedure: LAPAROSCOPIC CHOLECYSTECTOMY;  Surgeon: Mark Bee MD;  Location: [facility] Main OR;  Service: General    ORIF TIBIA & FIBULA FRACTURES Right 1945    TONSILLECTOMY  1944     Family History   Problem Relation Age of Onset    Other - See Comments Mother         gallbladder surgery    Cerebrovascular Disease Father     Coronary Artery Disease Brother         Social History     Socioeconomic History    Marital status: Single     Spouse name: Not on file    Number of children: Not on file    Years of education: Not on file    Highest education level: Not on file   Occupational History    Not on file   Tobacco Use    Smoking status: Former    Smokeless tobacco: Never   Vaping Use    Vaping Use: Never used   Substance and Sexual Activity    Alcohol use: Yes     Alcohol/week: 1.0 standard drink of alcohol     Comment: Alcoholic Drinks/day: rare - only in [place]    Drug use: No    Sexual activity: Never   Other Topics Concern    Not on file   Social History Narrative    Rod Villanueva, [employer].  Graduate [employer]  Was  for [employer].  From [facility].  He is into genealogy.       Social Determinants of Health     Financial Resource Strain: Low Risk  (11/2/2023)    Financial Resource Strain     Within the past 12 months, have you or your family members you live with been unable to get utilities (heat, electricity) when it was really needed?: No   Food Insecurity: Low Risk  (11/2/2023)    Food Insecurity     Within the past 12 months, did you worry that your food would run out before you got money to buy more?: No     Within the past 12 months, did the food you bought just not last and you didn t have money to get more?: No   Transportation Needs: Low Risk  (11/2/2023)    Transportation  Needs     Within the past 12 months, has lack of transportation kept you from medical appointments, getting your medicines, non-medical meetings or appointments, work, or from getting things that you need?: No   Physical Activity: Not on file   Stress: Not on file   Social Connections: Not on file   Interpersonal Safety: Low Risk  (11/2/2023)    Interpersonal Safety     Do you feel physically and emotionally safe where you currently live?: Yes     Within the past 12 months, have you been hit, slapped, kicked or otherwise physically hurt by someone?: No     Within the past 12 months, have you been humiliated or emotionally abused in other ways by your partner or ex-partner?: No   Housing Stability: High Risk (11/2/2023)    Housing Stability     Do you have housing? : No     Are you worried about losing your housing?: No           Medications  Allergies   Current Outpatient Medications   Medication Sig Dispense Refill    acetaminophen (TYLENOL) 325 MG tablet [ACETAMINOPHEN (TYLENOL) 325 MG TABLET] Take 2 tablets (650 mg total) by mouth every 4 (four) hours as needed.  0    clopidogrel (PLAVIX) 75 MG tablet Take 1 tablet (75 mg) by mouth daily 90 tablet 1    diphenhydrAMINE (BENADRYL) 25 MG tablet Take 1-2 tablets (25-50 mg) by mouth nightly as needed (angioedema)      famotidine (PEPCID) 20 MG tablet Take 1 tablet (20 mg) by mouth At Bedtime 180 tablet 3    fexofenadine (ALLEGRA) 180 MG tablet Take 1 tablet (180 mg) by mouth daily 90 tablet 3    hydrochlorothiazide (HYDRODIURIL) 25 MG tablet TAKE 1 TABLET(25 MG) BY MOUTH DAILY 90 tablet 1    metoprolol succinate ER (TOPROL XL) 100 MG 24 hr tablet TAKE 1 TABLET(100 MG) BY MOUTH DAILY 90 tablet 1    nitroglycerin (NITROSTAT) 0.4 MG SL tablet [NITROGLYCERIN (NITROSTAT) 0.4 MG SL TABLET] Place 1 tablet (0.4 mg total) under the tongue as needed. Use as directed. 30 tablet 0    simvastatin (ZOCOR) 40 MG tablet TAKE 1 TABLET BY MOUTH AT BEDTIME 90 tablet 0    vitamin D3  "(CHOLECALCIFEROL) 50 mcg (2000 units) tablet Take 1 tablet (50 mcg) by mouth daily         Allergies   Allergen Reactions    Ace Inhibitors Angioedema    Arb-Angiotensin Receptor Antagonist [Angiotensin Receptor Blockers] Angioedema    Aspirin Unknown     Angioedema - stopped aspirin, angioedema resolved, restarted recurred (fairly strong evidence)    Gabapentin Angioedema    Losartan Angioedema    Ramipril Angioedema    Seafood Nausea and Vomiting    Shellfish Containing Products [Shellfish-Derived Products] Nausea and Vomiting     SHRIMP- worst reaction to.          Lab Results    Chemistry/lipid CBC Cardiac Enzymes/BNP/TSH/INR   Recent Labs   Lab Test 11/02/23  0957   CHOL 124   HDL 37*   LDL 71   TRIG 82     Recent Labs   Lab Test 11/02/23  0957 11/01/22  0935 10/04/21  0848   LDL 71 75 64     Recent Labs   Lab Test 11/02/23  0957      POTASSIUM 3.8   CHLORIDE 103   CO2 24   *   BUN 21.1   CR 0.87   GFRESTIMATED 85   DAMIÁN 9.1     Recent Labs   Lab Test 11/02/23  0957 05/01/23  0953 11/01/22  0935   CR 0.87 1.04 0.92     Recent Labs   Lab Test 11/02/23  0957 01/17/19  0908   A1C 5.6 5.7          Recent Labs   Lab Test 11/02/23  0957   WBC 7.4   HGB 15.3   HCT 46.8   MCV 95   *     Recent Labs   Lab Test 11/02/23  0957 05/01/23  0953 11/01/22  0935   HGB 15.3 15.3 15.3    No results for input(s): \"TROPONINI\" in the last 45570 hours.  No results for input(s): \"BNP\", \"NTBNPI\", \"NTBNP\" in the last 48842 hours.  Recent Labs   Lab Test 11/02/23  0957   TSH 2.48     No results for input(s): \"INR\" in the last 70653 hours.       Medications  Allergies   Current Outpatient Medications   Medication Sig Dispense Refill    acetaminophen (TYLENOL) 325 MG tablet [ACETAMINOPHEN (TYLENOL) 325 MG TABLET] Take 2 tablets (650 mg total) by mouth every 4 (four) hours as needed.  0    clopidogrel (PLAVIX) 75 MG tablet Take 1 tablet (75 mg) by mouth daily 90 tablet 1    diphenhydrAMINE (BENADRYL) 25 MG tablet Take 1-2 " tablets (25-50 mg) by mouth nightly as needed (angioedema)      famotidine (PEPCID) 20 MG tablet Take 1 tablet (20 mg) by mouth At Bedtime 180 tablet 3    fexofenadine (ALLEGRA) 180 MG tablet Take 1 tablet (180 mg) by mouth daily 90 tablet 3    hydrochlorothiazide (HYDRODIURIL) 25 MG tablet TAKE 1 TABLET(25 MG) BY MOUTH DAILY 90 tablet 1    metoprolol succinate ER (TOPROL XL) 100 MG 24 hr tablet TAKE 1 TABLET(100 MG) BY MOUTH DAILY 90 tablet 1    nitroglycerin (NITROSTAT) 0.4 MG SL tablet [NITROGLYCERIN (NITROSTAT) 0.4 MG SL TABLET] Place 1 tablet (0.4 mg total) under the tongue as needed. Use as directed. 30 tablet 0    simvastatin (ZOCOR) 40 MG tablet TAKE 1 TABLET BY MOUTH AT BEDTIME 90 tablet 0    vitamin D3 (CHOLECALCIFEROL) 50 mcg (2000 units) tablet Take 1 tablet (50 mcg) by mouth daily        Allergies   Allergen Reactions    Ace Inhibitors Angioedema    Arb-Angiotensin Receptor Antagonist [Angiotensin Receptor Blockers] Angioedema    Aspirin Unknown     Angioedema - stopped aspirin, angioedema resolved, restarted recurred (fairly strong evidence)    Gabapentin Angioedema    Losartan Angioedema    Ramipril Angioedema    Seafood Nausea and Vomiting    Shellfish Containing Products [Shellfish-Derived Products] Nausea and Vomiting     SHRIMP- worst reaction to.          Lab Results   Lab Results   Component Value Date     11/02/2023    CO2 24 11/02/2023    CO2 22 12/23/2021    BUN 21.1 11/02/2023    BUN 24 12/23/2021     Lab Results   Component Value Date    WBC 7.4 11/02/2023    HGB 15.3 11/02/2023    HCT 46.8 11/02/2023    MCV 95 11/02/2023     11/02/2023     Lab Results   Component Value Date    CHOL 124 11/02/2023    TRIG 82 11/02/2023    HDL 37 11/02/2023     Lab Results   Component Value Date    TSH 2.48 11/02/2023    TSH 1.53 06/23/2020                      Thank you for allowing me to participate in the care of your patient.      Sincerely,     Loyda Contreras MD     [facility]  [facility]  cc:   Loyda Contreras MD  [address]  [place] [address]

## 2024-03-12 NOTE — PROGRESS NOTES
[facility] [address], [place] [address]   [email or web address]   OFFICE: [phone]            Impression and Plan     1.  Coronary artery disease.  Chaim has known coronary artery disease.  Specifically, Chaim had suffered a remote anterior myocardial infarction in 1999 at which time he underwent stenting of the LAD and circumflex coronary arteries.  Chaim is on antiplatelet therapy with clopidogrel.  Continue clopidogrel.  Continue metoprolol succinate 100 mg daily.     2.  Cardiomyopathy.  Chaim in the past had been noted to have mild depression and left ventricular systolic performance.  Echocardiogram 14 January 2022 suggested ejection fraction of 55-60% (ejection fraction 44% by nuclear perfusion imaging study performed that same day on 14 January 2022).     He is subjectively & objectively has been doing well.  Continue metoprolol as per problem #1.     3.  NSVT.  Chaim underwent ICD implantation 12 February 2018 (Medtronic [id number] Evera XT VR device).  He has been followed intermittently through the Electrophysiology Subsection.    Continue metoprolol succinate 100 mg daily as per problem #1 & 2.     4.  Dyslipidemia.  Lipid profile 2 November 2023 revealed LDL 71 mg/dL and HDL 37 mg/dL.  Continue statin therapy.     Plan on follow-up in 1 year.  Chaim will also follow routinely through the Device Clinic per protocol.      35 minutes spent reviewing prior records (including documentation, laboratory studies, cardiac testing/imaging), interview with patient along with physical exam, planning, and subsequent documentation/crafting of note).           History of Present Illness    Once again I would like to thank you again for asking me to participate in the care of your patient, Chaim Dee.  As you know, but to reiterate for my own records, Chaim Dee is a 83 year old male with known coronary artery disease.  Specifically,  Chaim had suffered a remote anterior myocardial infarction in 1999 at which time he underwent stenting of the LAD and circumflex coronary arteries.     On interview today, Chaim is without specific cardiac complaint.  He reports no chest pain.  His breathing is comfortable.  His exercise tolerance is at his baseline.  No palpitations or lightheadedness.    Further review of systems is otherwise negative/noncontributory (medical record and 13 point review of systems reviewed as well and pertinent positives noted).         Cardiac Diagnostics      Echocardiogram 14 January 2022:  Normal left ventricular size and systolic performance with ejection fraction of 55-60%.  No significant valvular heart disease  Normal right ventricular size and systolic performance.  Normal atrial dimensions.    Regadenoson nuclear perfusion study 14 January 2022:  There is a large area of a severe degree of transmural infarction in the mid to distal anterior, apical, inferior, lateral and septal segment(s) of the left ventricle. No significant ischemia is identified.  The left ventricular ejection fraction at stress is 44%.    Device interrogation 6 April 2023 (Medtronic [id number] Evera XT VR device implanted 12 February 2018):  Encounter type: routine remote ICD transmission.  Device: Medtronic Evera  Pacing%/Programmed:  0.1% in VVI 40 ppm mode.  Lead(s): stable  Battery longevity: estimated 3.5 yrs  Presenting: ventricular sensing, appears normal sinus rhythm with rare PVC, rate 61 bpm.  Atrial high rates: n/a  Anticoagulant: none  Ventricular high rates: since 7 November 2023, four NSVT detected.  Comments: normal device function.     Device interrogation 6 April 2023 (Medtronic [id number] Evera XT VR device implanted 12 February 2018):  Encounter Type: Routine remote device check  Device: Medtronic Evera (S) ICD  Pacing %/Programmed:  0.1%, VVI 40bpm  Lead(s): Stable  Battery longevity: Estimating 4.1 years remaining  Presenting: VS  55bpm  Atrial arrhythmias: Since 31 October 2022, none detected  Anticoagulant: None  Ventricular arrhythmias: 1 VHR episode logged 15 December 2022, 15 beats NSVT 211bpm.  Device/Lead alerts: Potential for shortened RRT-to-EOL, measurements stable and patient able to hear Alert Tones.   Comments: Normal device function.           Physical Examination       /68 (BP Location: Left arm, Patient Position: Sitting, Cuff Size: Adult Large)   Pulse 72   Resp 14   Wt 127.9 kg (282 lb)   BMI 39.88 kg/m          Wt Readings from Last 3 Encounters:   03/12/24 127.9 kg (282 lb)   11/02/23 126.6 kg (279 lb)   05/04/23 126.7 kg (279 lb 6.4 oz)       The patient is alert and oriented times three. Sclerae are anicteric. Mucosal membranes are moist. Jugular venous pressure is normal. No significant adenopathy/thyromegally appreciated. Lungs are clear with good expansion. On cardiovascular exam, the patient has a regular S1 and S2. Abdomen is soft and non-tender. Extremities reveal no clubbing, cyanosis, or edema.       Family History/Social History/Risk Factors   Patient does not smoke.  Family history reviewed, and family history includes Cerebrovascular Disease in his father; Coronary Artery Disease in his brother; Other - See Comments in his mother.        Medical History  Surgical History Family History Social History   Past Medical History:   Diagnosis Date    Arthritis unknown    right knee    Cholecystitis     Coronary artery disease     had stents placed 08/08/1999    Hypercholesteremia     Hypertension     ICD (implantable cardioverter-defibrillator) in place     Myocardial infarction (H) 1999    Obesity     Scrotal swelling     Sleep apnea     can't tolerate cpap     Past Surgical History:   Procedure Laterality Date    CARDIAC DEFIBRILLATOR PLACEMENT  2015    CORONARY STENT PLACEMENT  08/08/1999    LAD and Circ    FRACTURE SURGERY      LAPAROSCOPIC CHOLECYSTECTOMY N/A 11/9/2018    Procedure: LAPAROSCOPIC  CHOLECYSTECTOMY;  Surgeon: Mark Bee MD;  Location: [facility] Main OR;  Service: General    ORIF TIBIA & FIBULA FRACTURES Right 1945    TONSILLECTOMY  1944     Family History   Problem Relation Age of Onset    Other - See Comments Mother         gallbladder surgery    Cerebrovascular Disease Father     Coronary Artery Disease Brother         Social History     Socioeconomic History    Marital status: Single     Spouse name: Not on file    Number of children: Not on file    Years of education: Not on file    Highest education level: Not on file   Occupational History    Not on file   Tobacco Use    Smoking status: Former    Smokeless tobacco: Never   Vaping Use    Vaping Use: Never used   Substance and Sexual Activity    Alcohol use: Yes     Alcohol/week: 1.0 standard drink of alcohol     Comment: Alcoholic Drinks/day: rare - only in [place]    Drug use: No    Sexual activity: Never   Other Topics Concern    Not on file   Social History Narrative    Rod Villanueva, [employer].  Graduate [employer]  Was  for [employer].  From [facility] [place] [facility].  He is into genealogy.       Social Determinants of Health     Financial Resource Strain: Low Risk  (11/2/2023)    Financial Resource Strain     Within the past 12 months, have you or your family members you live with been unable to get utilities (heat, electricity) when it was really needed?: No   Food Insecurity: Low Risk  (11/2/2023)    Food Insecurity     Within the past 12 months, did you worry that your food would run out before you got money to buy more?: No     Within the past 12 months, did the food you bought just not last and you didn t have money to get more?: No   Transportation Needs: Low Risk  (11/2/2023)    Transportation Needs     Within the past 12 months, has lack of transportation kept you from medical appointments, getting your medicines, non-medical meetings or appointments, work, or from getting things that you need?: No   Physical  Activity: Not on file   Stress: Not on file   Social Connections: Not on file   Interpersonal Safety: Low Risk  (11/2/2023)    Interpersonal Safety     Do you feel physically and emotionally safe where you currently live?: Yes     Within the past 12 months, have you been hit, slapped, kicked or otherwise physically hurt by someone?: No     Within the past 12 months, have you been humiliated or emotionally abused in other ways by your partner or ex-partner?: No   Housing Stability: High Risk (11/2/2023)    Housing Stability     Do you have housing? : No     Are you worried about losing your housing?: No           Medications  Allergies   Current Outpatient Medications   Medication Sig Dispense Refill    acetaminophen (TYLENOL) 325 MG tablet [ACETAMINOPHEN (TYLENOL) 325 MG TABLET] Take 2 tablets (650 mg total) by mouth every 4 (four) hours as needed.  0    clopidogrel (PLAVIX) 75 MG tablet Take 1 tablet (75 mg) by mouth daily 90 tablet 1    diphenhydrAMINE (BENADRYL) 25 MG tablet Take 1-2 tablets (25-50 mg) by mouth nightly as needed (angioedema)      famotidine (PEPCID) 20 MG tablet Take 1 tablet (20 mg) by mouth At Bedtime 180 tablet 3    fexofenadine (ALLEGRA) 180 MG tablet Take 1 tablet (180 mg) by mouth daily 90 tablet 3    hydrochlorothiazide (HYDRODIURIL) 25 MG tablet TAKE 1 TABLET(25 MG) BY MOUTH DAILY 90 tablet 1    metoprolol succinate ER (TOPROL XL) 100 MG 24 hr tablet TAKE 1 TABLET(100 MG) BY MOUTH DAILY 90 tablet 1    nitroglycerin (NITROSTAT) 0.4 MG SL tablet [NITROGLYCERIN (NITROSTAT) 0.4 MG SL TABLET] Place 1 tablet (0.4 mg total) under the tongue as needed. Use as directed. 30 tablet 0    simvastatin (ZOCOR) 40 MG tablet TAKE 1 TABLET BY MOUTH AT BEDTIME 90 tablet 0    vitamin D3 (CHOLECALCIFEROL) 50 mcg (2000 units) tablet Take 1 tablet (50 mcg) by mouth daily         Allergies   Allergen Reactions    Ace Inhibitors Angioedema    Arb-Angiotensin Receptor Antagonist [Angiotensin Receptor Blockers]  "Angioedema    Aspirin Unknown     Angioedema - stopped aspirin, angioedema resolved, restarted recurred (fairly strong evidence)    Gabapentin Angioedema    Losartan Angioedema    Ramipril Angioedema    Seafood Nausea and Vomiting    Shellfish Containing Products [Shellfish-Derived Products] Nausea and Vomiting     SHRIMP- worst reaction to.          Lab Results    Chemistry/lipid CBC Cardiac Enzymes/BNP/TSH/INR   Recent Labs   Lab Test 11/02/23  0957   CHOL 124   HDL 37*   LDL 71   TRIG 82     Recent Labs   Lab Test 11/02/23  0957 11/01/22  0935 10/04/21  0848   LDL 71 75 64     Recent Labs   Lab Test 11/02/23  0957      POTASSIUM 3.8   CHLORIDE 103   CO2 24   *   BUN 21.1   CR 0.87   GFRESTIMATED 85   DAMIÁN 9.1     Recent Labs   Lab Test 11/02/23  0957 05/01/23  0953 11/01/22  0935   CR 0.87 1.04 0.92     Recent Labs   Lab Test 11/02/23  0957 01/17/19  0908   A1C 5.6 5.7          Recent Labs   Lab Test 11/02/23  0957   WBC 7.4   HGB 15.3   HCT 46.8   MCV 95   *     Recent Labs   Lab Test 11/02/23  0957 05/01/23  0953 11/01/22  0935   HGB 15.3 15.3 15.3    No results for input(s): \"TROPONINI\" in the last 09906 hours.  No results for input(s): \"BNP\", \"NTBNPI\", \"NTBNP\" in the last 07381 hours.  Recent Labs   Lab Test 11/02/23  0957   TSH 2.48     No results for input(s): \"INR\" in the last 04563 hours.       Medications  Allergies   Current Outpatient Medications   Medication Sig Dispense Refill    acetaminophen (TYLENOL) 325 MG tablet [ACETAMINOPHEN (TYLENOL) 325 MG TABLET] Take 2 tablets (650 mg total) by mouth every 4 (four) hours as needed.  0    clopidogrel (PLAVIX) 75 MG tablet Take 1 tablet (75 mg) by mouth daily 90 tablet 1    diphenhydrAMINE (BENADRYL) 25 MG tablet Take 1-2 tablets (25-50 mg) by mouth nightly as needed (angioedema)      famotidine (PEPCID) 20 MG tablet Take 1 tablet (20 mg) by mouth At Bedtime 180 tablet 3    fexofenadine (ALLEGRA) 180 MG tablet Take 1 tablet (180 mg) by " mouth daily 90 tablet 3    hydrochlorothiazide (HYDRODIURIL) 25 MG tablet TAKE 1 TABLET(25 MG) BY MOUTH DAILY 90 tablet 1    metoprolol succinate ER (TOPROL XL) 100 MG 24 hr tablet TAKE 1 TABLET(100 MG) BY MOUTH DAILY 90 tablet 1    nitroglycerin (NITROSTAT) 0.4 MG SL tablet [NITROGLYCERIN (NITROSTAT) 0.4 MG SL TABLET] Place 1 tablet (0.4 mg total) under the tongue as needed. Use as directed. 30 tablet 0    simvastatin (ZOCOR) 40 MG tablet TAKE 1 TABLET BY MOUTH AT BEDTIME 90 tablet 0    vitamin D3 (CHOLECALCIFEROL) 50 mcg (2000 units) tablet Take 1 tablet (50 mcg) by mouth daily        Allergies   Allergen Reactions    Ace Inhibitors Angioedema    Arb-Angiotensin Receptor Antagonist [Angiotensin Receptor Blockers] Angioedema    Aspirin Unknown     Angioedema - stopped aspirin, angioedema resolved, restarted recurred (fairly strong evidence)    Gabapentin Angioedema    Losartan Angioedema    Ramipril Angioedema    Seafood Nausea and Vomiting    Shellfish Containing Products [Shellfish-Derived Products] Nausea and Vomiting     SHRIMP- worst reaction to.          Lab Results   Lab Results   Component Value Date     11/02/2023    CO2 24 11/02/2023    CO2 22 12/23/2021    BUN 21.1 11/02/2023    BUN 24 12/23/2021     Lab Results   Component Value Date    WBC 7.4 11/02/2023    HGB 15.3 11/02/2023    HCT 46.8 11/02/2023    MCV 95 11/02/2023     11/02/2023     Lab Results   Component Value Date    CHOL 124 11/02/2023    TRIG 82 11/02/2023    HDL 37 11/02/2023     Lab Results   Component Value Date    TSH 2.48 11/02/2023    TSH 1.53 06/23/2020

## 2024-03-15 LAB
MDC_IDC_EPISODE_DTM: NORMAL
MDC_IDC_EPISODE_DTM: NORMAL
MDC_IDC_EPISODE_DURATION: 1 S
MDC_IDC_EPISODE_DURATION: 6 S
MDC_IDC_EPISODE_ID: 106
MDC_IDC_EPISODE_ID: 107
MDC_IDC_EPISODE_TYPE: NORMAL
MDC_IDC_EPISODE_TYPE: NORMAL
MDC_IDC_EPISODE_TYPE_INDUCED: NO
MDC_IDC_EPISODE_TYPE_INDUCED: NO
MDC_IDC_LEAD_CONNECTION_STATUS: NORMAL
MDC_IDC_LEAD_IMPLANT_DT: NORMAL
MDC_IDC_LEAD_LOCATION: NORMAL
MDC_IDC_LEAD_LOCATION_DETAIL_1: NORMAL
MDC_IDC_LEAD_MFG: NORMAL
MDC_IDC_LEAD_MODEL: NORMAL
MDC_IDC_LEAD_POLARITY_TYPE: NORMAL
MDC_IDC_LEAD_SERIAL: NORMAL
MDC_IDC_LEAD_SPECIAL_FUNCTION: NORMAL
MDC_IDC_MSMT_BATTERY_DTM: NORMAL
MDC_IDC_MSMT_BATTERY_REMAINING_LONGEVITY: 41 MO
MDC_IDC_MSMT_BATTERY_RRT_TRIGGER: 2.73
MDC_IDC_MSMT_BATTERY_STATUS: NORMAL
MDC_IDC_MSMT_BATTERY_VOLTAGE: 2.98 V
MDC_IDC_MSMT_CAP_CHARGE_DTM: NORMAL
MDC_IDC_MSMT_CAP_CHARGE_ENERGY: 18 J
MDC_IDC_MSMT_CAP_CHARGE_TIME: 4.11
MDC_IDC_MSMT_CAP_CHARGE_TYPE: NORMAL
MDC_IDC_MSMT_LEADCHNL_RV_IMPEDANCE_VALUE: 304 OHM
MDC_IDC_MSMT_LEADCHNL_RV_IMPEDANCE_VALUE: 342 OHM
MDC_IDC_MSMT_LEADCHNL_RV_PACING_THRESHOLD_AMPLITUDE: 0.62 V
MDC_IDC_MSMT_LEADCHNL_RV_PACING_THRESHOLD_AMPLITUDE: 0.75 V
MDC_IDC_MSMT_LEADCHNL_RV_PACING_THRESHOLD_PULSEWIDTH: 0.4 MS
MDC_IDC_MSMT_LEADCHNL_RV_PACING_THRESHOLD_PULSEWIDTH: 0.4 MS
MDC_IDC_MSMT_LEADCHNL_RV_SENSING_INTR_AMPL: 5 MV
MDC_IDC_MSMT_LEADCHNL_RV_SENSING_INTR_AMPL: 5.62 MV
MDC_IDC_PG_IMPLANT_DTM: NORMAL
MDC_IDC_PG_MFG: NORMAL
MDC_IDC_PG_MODEL: NORMAL
MDC_IDC_PG_SERIAL: NORMAL
MDC_IDC_PG_TYPE: NORMAL
MDC_IDC_SESS_CLINIC_NAME: NORMAL
MDC_IDC_SESS_DTM: NORMAL
MDC_IDC_SESS_TYPE: NORMAL
MDC_IDC_SET_BRADY_HYSTRATE: NORMAL
MDC_IDC_SET_BRADY_LOWRATE: 40 {BEATS}/MIN
MDC_IDC_SET_BRADY_MODE: NORMAL
MDC_IDC_SET_LEADCHNL_RV_PACING_AMPLITUDE: 1.5 V
MDC_IDC_SET_LEADCHNL_RV_PACING_ANODE_ELECTRODE_1: NORMAL
MDC_IDC_SET_LEADCHNL_RV_PACING_ANODE_LOCATION_1: NORMAL
MDC_IDC_SET_LEADCHNL_RV_PACING_CAPTURE_MODE: NORMAL
MDC_IDC_SET_LEADCHNL_RV_PACING_CATHODE_ELECTRODE_1: NORMAL
MDC_IDC_SET_LEADCHNL_RV_PACING_CATHODE_LOCATION_1: NORMAL
MDC_IDC_SET_LEADCHNL_RV_PACING_POLARITY: NORMAL
MDC_IDC_SET_LEADCHNL_RV_PACING_PULSEWIDTH: 0.4 MS
MDC_IDC_SET_LEADCHNL_RV_SENSING_ANODE_ELECTRODE_1: NORMAL
MDC_IDC_SET_LEADCHNL_RV_SENSING_ANODE_LOCATION_1: NORMAL
MDC_IDC_SET_LEADCHNL_RV_SENSING_CATHODE_ELECTRODE_1: NORMAL
MDC_IDC_SET_LEADCHNL_RV_SENSING_CATHODE_LOCATION_1: NORMAL
MDC_IDC_SET_LEADCHNL_RV_SENSING_POLARITY: NORMAL
MDC_IDC_SET_LEADCHNL_RV_SENSING_SENSITIVITY: 0.3 MV
MDC_IDC_SET_ZONE_DETECTION_BEATS_DENOMINATOR: 16 {BEATS}
MDC_IDC_SET_ZONE_DETECTION_BEATS_DENOMINATOR: 28 {BEATS}
MDC_IDC_SET_ZONE_DETECTION_BEATS_DENOMINATOR: 32 {BEATS}
MDC_IDC_SET_ZONE_DETECTION_BEATS_NUMERATOR: 16 {BEATS}
MDC_IDC_SET_ZONE_DETECTION_BEATS_NUMERATOR: 24 {BEATS}
MDC_IDC_SET_ZONE_DETECTION_BEATS_NUMERATOR: 28 {BEATS}
MDC_IDC_SET_ZONE_DETECTION_INTERVAL: 240 MS
MDC_IDC_SET_ZONE_DETECTION_INTERVAL: 320 MS
MDC_IDC_SET_ZONE_DETECTION_INTERVAL: 360 MS
MDC_IDC_SET_ZONE_DETECTION_INTERVAL: 400 MS
MDC_IDC_SET_ZONE_STATUS: NORMAL
MDC_IDC_SET_ZONE_TYPE: NORMAL
MDC_IDC_SET_ZONE_VENDOR_TYPE: NORMAL
MDC_IDC_STAT_BRADY_DTM_END: NORMAL
MDC_IDC_STAT_BRADY_DTM_START: NORMAL
MDC_IDC_STAT_BRADY_RV_PERCENT_PACED: 0.13 %
MDC_IDC_STAT_EPISODE_RECENT_COUNT: 0
MDC_IDC_STAT_EPISODE_RECENT_COUNT: 8
MDC_IDC_STAT_EPISODE_RECENT_COUNT_DTM_END: NORMAL
MDC_IDC_STAT_EPISODE_RECENT_COUNT_DTM_START: NORMAL
MDC_IDC_STAT_EPISODE_TOTAL_COUNT: 0
MDC_IDC_STAT_EPISODE_TOTAL_COUNT: 1
MDC_IDC_STAT_EPISODE_TOTAL_COUNT: 101
MDC_IDC_STAT_EPISODE_TOTAL_COUNT: 3
MDC_IDC_STAT_EPISODE_TOTAL_COUNT_DTM_END: NORMAL
MDC_IDC_STAT_EPISODE_TOTAL_COUNT_DTM_START: NORMAL
MDC_IDC_STAT_EPISODE_TYPE: NORMAL
MDC_IDC_STAT_TACHYTHERAPY_ATP_DELIVERED_RECENT: 0
MDC_IDC_STAT_TACHYTHERAPY_ATP_DELIVERED_TOTAL: 0
MDC_IDC_STAT_TACHYTHERAPY_RECENT_DTM_END: NORMAL
MDC_IDC_STAT_TACHYTHERAPY_RECENT_DTM_START: NORMAL
MDC_IDC_STAT_TACHYTHERAPY_SHOCKS_ABORTED_RECENT: 0
MDC_IDC_STAT_TACHYTHERAPY_SHOCKS_ABORTED_TOTAL: 0
MDC_IDC_STAT_TACHYTHERAPY_SHOCKS_DELIVERED_RECENT: 0
MDC_IDC_STAT_TACHYTHERAPY_SHOCKS_DELIVERED_TOTAL: 1
MDC_IDC_STAT_TACHYTHERAPY_TOTAL_DTM_END: NORMAL
MDC_IDC_STAT_TACHYTHERAPY_TOTAL_DTM_START: NORMAL

## 2024-03-15 PROCEDURE — 93282 PRGRMG EVAL IMPLANTABLE DFB: CPT | Performed by: INTERNAL MEDICINE

## 2024-03-30 DIAGNOSIS — E78.2 MIXED HYPERLIPIDEMIA: ICD-10-CM

## 2024-04-01 DIAGNOSIS — I10 ESSENTIAL HYPERTENSION: ICD-10-CM

## 2024-04-02 RX ORDER — SIMVASTATIN 40 MG
40 TABLET ORAL AT BEDTIME
Qty: 90 TABLET | Refills: 1 | Status: SHIPPED | OUTPATIENT
Start: 2024-04-02 | End: 2024-10-02

## 2024-04-02 RX ORDER — HYDROCHLOROTHIAZIDE 25 MG/1
25 TABLET ORAL DAILY
Qty: 90 TABLET | Refills: 1 | Status: SHIPPED | OUTPATIENT
Start: 2024-04-02 | End: 2024-10-02

## 2024-04-06 DIAGNOSIS — I10 ESSENTIAL HYPERTENSION: ICD-10-CM

## 2024-04-08 RX ORDER — METOPROLOL SUCCINATE 100 MG/1
100 TABLET, EXTENDED RELEASE ORAL DAILY
Qty: 90 TABLET | Refills: 1 | Status: SHIPPED | OUTPATIENT
Start: 2024-04-08

## 2024-05-02 ENCOUNTER — OFFICE VISIT (OUTPATIENT)
Dept: INTERNAL MEDICINE | Facility: CLINIC | Age: 84
End: 2024-05-02
Payer: MEDICARE

## 2024-05-02 VITALS
WEIGHT: 277 LBS | BODY MASS INDEX: 39.65 KG/M2 | OXYGEN SATURATION: 99 % | HEIGHT: 70 IN | HEART RATE: 72 BPM | SYSTOLIC BLOOD PRESSURE: 137 MMHG | TEMPERATURE: 98.1 F | DIASTOLIC BLOOD PRESSURE: 77 MMHG | RESPIRATION RATE: 17 BRPM

## 2024-05-02 DIAGNOSIS — Z95.810 ICD (IMPLANTABLE CARDIOVERTER-DEFIBRILLATOR), SINGLE, IN SITU: ICD-10-CM

## 2024-05-02 DIAGNOSIS — I10 PRIMARY HYPERTENSION: ICD-10-CM

## 2024-05-02 DIAGNOSIS — T78.3XXS ANGIOEDEMA, SEQUELA: ICD-10-CM

## 2024-05-02 DIAGNOSIS — I50.22 CHRONIC HFREF (HEART FAILURE WITH REDUCED EJECTION FRACTION) (H): Primary | ICD-10-CM

## 2024-05-02 DIAGNOSIS — I47.29 PAROXYSMAL VENTRICULAR TACHYCARDIA (H): ICD-10-CM

## 2024-05-02 DIAGNOSIS — E66.01 MORBID OBESITY (H): ICD-10-CM

## 2024-05-02 DIAGNOSIS — M15.0 PRIMARY OSTEOARTHRITIS INVOLVING MULTIPLE JOINTS: ICD-10-CM

## 2024-05-02 DIAGNOSIS — I25.10 CHRONIC CORONARY ARTERY DISEASE: ICD-10-CM

## 2024-05-02 LAB
ERYTHROCYTE [DISTWIDTH] IN BLOOD BY AUTOMATED COUNT: 15 % (ref 10–15)
HCT VFR BLD AUTO: 45.9 % (ref 40–53)
HGB BLD-MCNC: 15.2 G/DL (ref 13.3–17.7)
MCH RBC QN AUTO: 30.7 PG (ref 26.5–33)
MCHC RBC AUTO-ENTMCNC: 33.1 G/DL (ref 31.5–36.5)
MCV RBC AUTO: 93 FL (ref 78–100)
PLATELET # BLD AUTO: 148 10E3/UL (ref 150–450)
RBC # BLD AUTO: 4.95 10E6/UL (ref 4.4–5.9)
WBC # BLD AUTO: 6.5 10E3/UL (ref 4–11)

## 2024-05-02 PROCEDURE — 99214 OFFICE O/P EST MOD 30 MIN: CPT | Performed by: INTERNAL MEDICINE

## 2024-05-02 PROCEDURE — 36415 COLL VENOUS BLD VENIPUNCTURE: CPT | Performed by: INTERNAL MEDICINE

## 2024-05-02 PROCEDURE — 90480 ADMN SARSCOV2 VAC 1/ONLY CMP: CPT | Performed by: INTERNAL MEDICINE

## 2024-05-02 PROCEDURE — 91320 SARSCV2 VAC 30MCG TRS-SUC IM: CPT | Performed by: INTERNAL MEDICINE

## 2024-05-02 PROCEDURE — 80048 BASIC METABOLIC PNL TOTAL CA: CPT | Performed by: INTERNAL MEDICINE

## 2024-05-02 PROCEDURE — G2211 COMPLEX E/M VISIT ADD ON: HCPCS | Performed by: INTERNAL MEDICINE

## 2024-05-02 PROCEDURE — 85027 COMPLETE CBC AUTOMATED: CPT | Performed by: INTERNAL MEDICINE

## 2024-05-02 ASSESSMENT — PAIN SCALES - GENERAL: PAINLEVEL: MILD PAIN (2)

## 2024-05-02 NOTE — PROGRESS NOTES
"  Office Visit - Follow Up   Chaim Dee   84 year old male    Date of Visit: 5/2/2024    Chief Complaint   Patient presents with    Recheck Medication    Follow Up     Heart or Circulation Problems  Right knee pain        Assessment and Plan   1. Chronic HFrEF (heart failure with reduced ejection fraction) (H)  Appears euvolemic, continue same  - Basic metabolic panel  (Ca, Cl, CO2, Creat, Gluc, K, Na, BUN); Future  - CBC with platelets; Future    2. Paroxysmal ventricular tachycardia (H)  3. ICD (implantable cardioverter-defibrillator), single, in situ  Doing well    4. Angioedema, sequela  No recurrence continue off of ACE inhibitor and ARB, continue off of aspirin and continue with Allegra    5. Chronic coronary artery disease  Stable continue statin and clopidogrel    6. Primary hypertension  Blood pressure okay continue same    7. Primary osteoarthritis involving multiple joints  Continue with acetaminophen as well as trial of knee     8. Morbid obesity (H)  Continue with reduction in calories, carbohydrates regular exercise and modest weight loss      Return in about 6 months (around 11/2/2024) for Routine preventive.     History of Present Illness   This 84 year old man comes in for follow-up.  Overall he is doing well.  Some right knee pain but he is thinking he can get a knee sleeve/brace that will be helpful.  No chest pain or shortness of breath.       Physical Exam   General Appearance:   No acute distress    /77 (BP Location: Left arm, Patient Position: Sitting, Cuff Size: Adult Large)   Pulse 72   Temp 98.1  F (36.7  C)   Resp 17   Ht 1.778 m (5' 10\")   Wt 125.6 kg (277 lb)   SpO2 99%   BMI 39.75 kg/m      Heart rate controlled rhythm regular lungs clear to auscultation bilaterally     Additional Information   Current Outpatient Medications   Medication Sig Dispense Refill    acetaminophen (TYLENOL) 325 MG tablet [ACETAMINOPHEN (TYLENOL) 325 MG TABLET] Take 2 tablets (650 " mg total) by mouth every 4 (four) hours as needed.  0    clopidogrel (PLAVIX) 75 MG tablet Take 1 tablet (75 mg) by mouth daily 90 tablet 1    diphenhydrAMINE (BENADRYL) 25 MG tablet Take 1-2 tablets (25-50 mg) by mouth nightly as needed (angioedema)      famotidine (PEPCID) 20 MG tablet Take 1 tablet (20 mg) by mouth At Bedtime 180 tablet 3    fexofenadine (ALLEGRA) 180 MG tablet Take 1 tablet (180 mg) by mouth daily 90 tablet 3    hydrochlorothiazide (HYDRODIURIL) 25 MG tablet TAKE 1 TABLET BY MOUTH DAILY 90 tablet 1    metoprolol succinate ER (TOPROL XL) 100 MG 24 hr tablet TAKE 1 TABLET(100 MG) BY MOUTH DAILY 90 tablet 1    nitroglycerin (NITROSTAT) 0.4 MG SL tablet [NITROGLYCERIN (NITROSTAT) 0.4 MG SL TABLET] Place 1 tablet (0.4 mg total) under the tongue as needed. Use as directed. 30 tablet 0    simvastatin (ZOCOR) 40 MG tablet Take 1 tablet (40 mg) by mouth at bedtime 90 tablet 1    vitamin D3 (CHOLECALCIFEROL) 50 mcg (2000 units) tablet Take 1 tablet (50 mcg) by mouth daily         Time:     The longitudinal plan of care for the diagnosis(es)/condition(s) as documented were addressed during this visit. Due to the added complexity in care, I will continue to support Chaim in the subsequent management and with ongoing continuity of care.     Luis Elena MD  Answers submitted by the patient for this visit:  Vascular Disease (Submitted on 5/2/2024)  Chief Complaint: Chronic problems general questions HPI Form  Do you take an aspirin every day?: No  General Questionnaire (Submitted on 5/2/2024)  Chief Complaint: Chronic problems general questions HPI Form  How many servings of fruits and vegetables do you eat daily?: 0-1  On average, how many sweetened beverages do you drink each day (Examples: soda, juice, sweet tea, etc.  Do NOT count diet or artificially sweetened beverages)?: 1  How many minutes a day do you exercise enough to make your heart beat faster?: 9 or less  How many days a week do you exercise  enough to make your heart beat faster?: 3 or less  How many days per week do you miss taking your medication?: 0

## 2024-05-03 LAB
ANION GAP SERPL CALCULATED.3IONS-SCNC: 11 MMOL/L (ref 7–15)
BUN SERPL-MCNC: 19.6 MG/DL (ref 8–23)
CALCIUM SERPL-MCNC: 9.2 MG/DL (ref 8.8–10.2)
CHLORIDE SERPL-SCNC: 102 MMOL/L (ref 98–107)
CREAT SERPL-MCNC: 0.91 MG/DL (ref 0.67–1.17)
DEPRECATED HCO3 PLAS-SCNC: 24 MMOL/L (ref 22–29)
EGFRCR SERPLBLD CKD-EPI 2021: 82 ML/MIN/1.73M2
GLUCOSE SERPL-MCNC: 98 MG/DL (ref 70–99)
POTASSIUM SERPL-SCNC: 4.1 MMOL/L (ref 3.4–5.3)
SODIUM SERPL-SCNC: 137 MMOL/L (ref 135–145)

## 2024-06-12 ENCOUNTER — ANCILLARY PROCEDURE (OUTPATIENT)
Dept: CARDIOLOGY | Facility: CLINIC | Age: 84
End: 2024-06-12
Attending: INTERNAL MEDICINE
Payer: MEDICARE

## 2024-06-12 DIAGNOSIS — I25.5 ISCHEMIC CARDIOMYOPATHY: ICD-10-CM

## 2024-06-12 DIAGNOSIS — Z95.810 ICD (IMPLANTABLE CARDIOVERTER-DEFIBRILLATOR) IN PLACE: ICD-10-CM

## 2024-06-12 LAB
MDC_IDC_EPISODE_DTM: NORMAL
MDC_IDC_EPISODE_DURATION: 2 S
MDC_IDC_EPISODE_ID: 108
MDC_IDC_EPISODE_TYPE: NORMAL
MDC_IDC_LEAD_CONNECTION_STATUS: NORMAL
MDC_IDC_LEAD_IMPLANT_DT: NORMAL
MDC_IDC_LEAD_LOCATION: NORMAL
MDC_IDC_LEAD_LOCATION_DETAIL_1: NORMAL
MDC_IDC_LEAD_MFG: NORMAL
MDC_IDC_LEAD_MODEL: NORMAL
MDC_IDC_LEAD_POLARITY_TYPE: NORMAL
MDC_IDC_LEAD_SERIAL: NORMAL
MDC_IDC_LEAD_SPECIAL_FUNCTION: NORMAL
MDC_IDC_MSMT_BATTERY_DTM: NORMAL
MDC_IDC_MSMT_BATTERY_REMAINING_LONGEVITY: 38 MO
MDC_IDC_MSMT_BATTERY_RRT_TRIGGER: 2.73
MDC_IDC_MSMT_BATTERY_STATUS: NORMAL
MDC_IDC_MSMT_BATTERY_VOLTAGE: 2.97 V
MDC_IDC_MSMT_CAP_CHARGE_DTM: NORMAL
MDC_IDC_MSMT_CAP_CHARGE_ENERGY: 18 J
MDC_IDC_MSMT_CAP_CHARGE_TIME: 4.17
MDC_IDC_MSMT_CAP_CHARGE_TYPE: NORMAL
MDC_IDC_MSMT_LEADCHNL_RV_IMPEDANCE_VALUE: 304 OHM
MDC_IDC_MSMT_LEADCHNL_RV_IMPEDANCE_VALUE: 342 OHM
MDC_IDC_MSMT_LEADCHNL_RV_PACING_THRESHOLD_AMPLITUDE: 0.62 V
MDC_IDC_MSMT_LEADCHNL_RV_PACING_THRESHOLD_PULSEWIDTH: 0.4 MS
MDC_IDC_MSMT_LEADCHNL_RV_SENSING_INTR_AMPL: 4.75 MV
MDC_IDC_MSMT_LEADCHNL_RV_SENSING_INTR_AMPL: 4.75 MV
MDC_IDC_PG_IMPLANT_DTM: NORMAL
MDC_IDC_PG_MFG: NORMAL
MDC_IDC_PG_MODEL: NORMAL
MDC_IDC_PG_SERIAL: NORMAL
MDC_IDC_PG_TYPE: NORMAL
MDC_IDC_SESS_CLINIC_NAME: NORMAL
MDC_IDC_SESS_DTM: NORMAL
MDC_IDC_SESS_TYPE: NORMAL
MDC_IDC_SET_BRADY_HYSTRATE: NORMAL
MDC_IDC_SET_BRADY_LOWRATE: 40 {BEATS}/MIN
MDC_IDC_SET_BRADY_MODE: NORMAL
MDC_IDC_SET_LEADCHNL_RV_PACING_AMPLITUDE: 1.5 V
MDC_IDC_SET_LEADCHNL_RV_PACING_ANODE_ELECTRODE_1: NORMAL
MDC_IDC_SET_LEADCHNL_RV_PACING_ANODE_LOCATION_1: NORMAL
MDC_IDC_SET_LEADCHNL_RV_PACING_CAPTURE_MODE: NORMAL
MDC_IDC_SET_LEADCHNL_RV_PACING_CATHODE_ELECTRODE_1: NORMAL
MDC_IDC_SET_LEADCHNL_RV_PACING_CATHODE_LOCATION_1: NORMAL
MDC_IDC_SET_LEADCHNL_RV_PACING_POLARITY: NORMAL
MDC_IDC_SET_LEADCHNL_RV_PACING_PULSEWIDTH: 0.4 MS
MDC_IDC_SET_LEADCHNL_RV_SENSING_ANODE_ELECTRODE_1: NORMAL
MDC_IDC_SET_LEADCHNL_RV_SENSING_ANODE_LOCATION_1: NORMAL
MDC_IDC_SET_LEADCHNL_RV_SENSING_CATHODE_ELECTRODE_1: NORMAL
MDC_IDC_SET_LEADCHNL_RV_SENSING_CATHODE_LOCATION_1: NORMAL
MDC_IDC_SET_LEADCHNL_RV_SENSING_POLARITY: NORMAL
MDC_IDC_SET_LEADCHNL_RV_SENSING_SENSITIVITY: 0.3 MV
MDC_IDC_SET_ZONE_DETECTION_BEATS_DENOMINATOR: 16 {BEATS}
MDC_IDC_SET_ZONE_DETECTION_BEATS_DENOMINATOR: 28 {BEATS}
MDC_IDC_SET_ZONE_DETECTION_BEATS_DENOMINATOR: 32 {BEATS}
MDC_IDC_SET_ZONE_DETECTION_BEATS_NUMERATOR: 16 {BEATS}
MDC_IDC_SET_ZONE_DETECTION_BEATS_NUMERATOR: 24 {BEATS}
MDC_IDC_SET_ZONE_DETECTION_BEATS_NUMERATOR: 28 {BEATS}
MDC_IDC_SET_ZONE_DETECTION_INTERVAL: 240 MS
MDC_IDC_SET_ZONE_DETECTION_INTERVAL: 320 MS
MDC_IDC_SET_ZONE_DETECTION_INTERVAL: 360 MS
MDC_IDC_SET_ZONE_DETECTION_INTERVAL: 400 MS
MDC_IDC_SET_ZONE_STATUS: NORMAL
MDC_IDC_SET_ZONE_TYPE: NORMAL
MDC_IDC_SET_ZONE_VENDOR_TYPE: NORMAL
MDC_IDC_STAT_BRADY_DTM_END: NORMAL
MDC_IDC_STAT_BRADY_DTM_START: NORMAL
MDC_IDC_STAT_BRADY_RV_PERCENT_PACED: 0.22 %
MDC_IDC_STAT_EPISODE_RECENT_COUNT: 0
MDC_IDC_STAT_EPISODE_RECENT_COUNT: 1
MDC_IDC_STAT_EPISODE_RECENT_COUNT_DTM_END: NORMAL
MDC_IDC_STAT_EPISODE_RECENT_COUNT_DTM_START: NORMAL
MDC_IDC_STAT_EPISODE_TOTAL_COUNT: 0
MDC_IDC_STAT_EPISODE_TOTAL_COUNT: 1
MDC_IDC_STAT_EPISODE_TOTAL_COUNT: 102
MDC_IDC_STAT_EPISODE_TOTAL_COUNT: 3
MDC_IDC_STAT_EPISODE_TOTAL_COUNT_DTM_END: NORMAL
MDC_IDC_STAT_EPISODE_TOTAL_COUNT_DTM_START: NORMAL
MDC_IDC_STAT_EPISODE_TYPE: NORMAL
MDC_IDC_STAT_TACHYTHERAPY_ATP_DELIVERED_RECENT: 0
MDC_IDC_STAT_TACHYTHERAPY_ATP_DELIVERED_TOTAL: 0
MDC_IDC_STAT_TACHYTHERAPY_RECENT_DTM_END: NORMAL
MDC_IDC_STAT_TACHYTHERAPY_RECENT_DTM_START: NORMAL
MDC_IDC_STAT_TACHYTHERAPY_SHOCKS_ABORTED_RECENT: 0
MDC_IDC_STAT_TACHYTHERAPY_SHOCKS_ABORTED_TOTAL: 0
MDC_IDC_STAT_TACHYTHERAPY_SHOCKS_DELIVERED_RECENT: 0
MDC_IDC_STAT_TACHYTHERAPY_SHOCKS_DELIVERED_TOTAL: 1
MDC_IDC_STAT_TACHYTHERAPY_TOTAL_DTM_END: NORMAL
MDC_IDC_STAT_TACHYTHERAPY_TOTAL_DTM_START: NORMAL

## 2024-06-12 PROCEDURE — 93296 REM INTERROG EVL PM/IDS: CPT | Performed by: INTERNAL MEDICINE

## 2024-06-12 PROCEDURE — 93295 DEV INTERROG REMOTE 1/2/MLT: CPT | Performed by: INTERNAL MEDICINE

## 2024-06-29 DIAGNOSIS — I25.10 CHRONIC CORONARY ARTERY DISEASE: ICD-10-CM

## 2024-07-02 RX ORDER — CLOPIDOGREL BISULFATE 75 MG/1
75 TABLET ORAL DAILY
Qty: 90 TABLET | Refills: 1 | Status: SHIPPED | OUTPATIENT
Start: 2024-07-02

## 2024-09-26 ENCOUNTER — ANCILLARY PROCEDURE (OUTPATIENT)
Dept: CARDIOLOGY | Facility: CLINIC | Age: 84
End: 2024-09-26
Attending: INTERNAL MEDICINE
Payer: MEDICARE

## 2024-09-26 DIAGNOSIS — I25.5 ISCHEMIC CARDIOMYOPATHY: ICD-10-CM

## 2024-09-26 DIAGNOSIS — Z95.810 ICD (IMPLANTABLE CARDIOVERTER-DEFIBRILLATOR) IN PLACE: ICD-10-CM

## 2024-09-29 LAB
MDC_IDC_EPISODE_DTM: NORMAL
MDC_IDC_EPISODE_DTM: NORMAL
MDC_IDC_EPISODE_DURATION: 1 S
MDC_IDC_EPISODE_DURATION: 4 S
MDC_IDC_EPISODE_ID: 109
MDC_IDC_EPISODE_ID: 110
MDC_IDC_EPISODE_TYPE: NORMAL
MDC_IDC_EPISODE_TYPE: NORMAL
MDC_IDC_LEAD_CONNECTION_STATUS: NORMAL
MDC_IDC_LEAD_IMPLANT_DT: NORMAL
MDC_IDC_LEAD_LOCATION: NORMAL
MDC_IDC_LEAD_LOCATION_DETAIL_1: NORMAL
MDC_IDC_LEAD_MFG: NORMAL
MDC_IDC_LEAD_MODEL: NORMAL
MDC_IDC_LEAD_POLARITY_TYPE: NORMAL
MDC_IDC_LEAD_SERIAL: NORMAL
MDC_IDC_LEAD_SPECIAL_FUNCTION: NORMAL
MDC_IDC_MSMT_BATTERY_DTM: NORMAL
MDC_IDC_MSMT_BATTERY_REMAINING_LONGEVITY: 37 MO
MDC_IDC_MSMT_BATTERY_RRT_TRIGGER: 2.73
MDC_IDC_MSMT_BATTERY_STATUS: NORMAL
MDC_IDC_MSMT_BATTERY_VOLTAGE: 2.89 V
MDC_IDC_MSMT_LEADCHNL_RV_IMPEDANCE_VALUE: 266 OHM
MDC_IDC_MSMT_LEADCHNL_RV_IMPEDANCE_VALUE: 342 OHM
MDC_IDC_MSMT_LEADCHNL_RV_PACING_THRESHOLD_AMPLITUDE: 0.75 V
MDC_IDC_MSMT_LEADCHNL_RV_PACING_THRESHOLD_PULSEWIDTH: 0.4 MS
MDC_IDC_MSMT_LEADCHNL_RV_SENSING_INTR_AMPL: 4.62 MV
MDC_IDC_MSMT_LEADCHNL_RV_SENSING_INTR_AMPL: 4.62 MV
MDC_IDC_PG_IMPLANT_DTM: NORMAL
MDC_IDC_PG_MFG: NORMAL
MDC_IDC_PG_MODEL: NORMAL
MDC_IDC_PG_SERIAL: NORMAL
MDC_IDC_PG_TYPE: NORMAL
MDC_IDC_SESS_CLINIC_NAME: NORMAL
MDC_IDC_SESS_DTM: NORMAL
MDC_IDC_SESS_TYPE: NORMAL
MDC_IDC_SET_BRADY_HYSTRATE: NORMAL
MDC_IDC_SET_BRADY_LOWRATE: 40 {BEATS}/MIN
MDC_IDC_SET_BRADY_MODE: NORMAL
MDC_IDC_SET_LEADCHNL_RV_PACING_AMPLITUDE: 1.5 V
MDC_IDC_SET_LEADCHNL_RV_PACING_ANODE_ELECTRODE_1: NORMAL
MDC_IDC_SET_LEADCHNL_RV_PACING_ANODE_LOCATION_1: NORMAL
MDC_IDC_SET_LEADCHNL_RV_PACING_CAPTURE_MODE: NORMAL
MDC_IDC_SET_LEADCHNL_RV_PACING_CATHODE_ELECTRODE_1: NORMAL
MDC_IDC_SET_LEADCHNL_RV_PACING_CATHODE_LOCATION_1: NORMAL
MDC_IDC_SET_LEADCHNL_RV_PACING_POLARITY: NORMAL
MDC_IDC_SET_LEADCHNL_RV_PACING_PULSEWIDTH: 0.4 MS
MDC_IDC_SET_LEADCHNL_RV_SENSING_ANODE_ELECTRODE_1: NORMAL
MDC_IDC_SET_LEADCHNL_RV_SENSING_ANODE_LOCATION_1: NORMAL
MDC_IDC_SET_LEADCHNL_RV_SENSING_CATHODE_ELECTRODE_1: NORMAL
MDC_IDC_SET_LEADCHNL_RV_SENSING_CATHODE_LOCATION_1: NORMAL
MDC_IDC_SET_LEADCHNL_RV_SENSING_POLARITY: NORMAL
MDC_IDC_SET_LEADCHNL_RV_SENSING_SENSITIVITY: 0.3 MV
MDC_IDC_SET_ZONE_DETECTION_BEATS_DENOMINATOR: 16 {BEATS}
MDC_IDC_SET_ZONE_DETECTION_BEATS_DENOMINATOR: 28 {BEATS}
MDC_IDC_SET_ZONE_DETECTION_BEATS_DENOMINATOR: 32 {BEATS}
MDC_IDC_SET_ZONE_DETECTION_BEATS_NUMERATOR: 16 {BEATS}
MDC_IDC_SET_ZONE_DETECTION_BEATS_NUMERATOR: 24 {BEATS}
MDC_IDC_SET_ZONE_DETECTION_BEATS_NUMERATOR: 28 {BEATS}
MDC_IDC_SET_ZONE_DETECTION_INTERVAL: 240 MS
MDC_IDC_SET_ZONE_DETECTION_INTERVAL: 320 MS
MDC_IDC_SET_ZONE_DETECTION_INTERVAL: 360 MS
MDC_IDC_SET_ZONE_DETECTION_INTERVAL: 400 MS
MDC_IDC_SET_ZONE_STATUS: NORMAL
MDC_IDC_SET_ZONE_TYPE: NORMAL
MDC_IDC_SET_ZONE_VENDOR_TYPE: NORMAL
MDC_IDC_STAT_BRADY_DTM_END: NORMAL
MDC_IDC_STAT_BRADY_DTM_START: NORMAL
MDC_IDC_STAT_BRADY_RV_PERCENT_PACED: 0.34 %
MDC_IDC_STAT_EPISODE_RECENT_COUNT: 0
MDC_IDC_STAT_EPISODE_RECENT_COUNT: 2
MDC_IDC_STAT_EPISODE_RECENT_COUNT_DTM_END: NORMAL
MDC_IDC_STAT_EPISODE_RECENT_COUNT_DTM_START: NORMAL
MDC_IDC_STAT_EPISODE_TOTAL_COUNT: 0
MDC_IDC_STAT_EPISODE_TOTAL_COUNT: 1
MDC_IDC_STAT_EPISODE_TOTAL_COUNT: 104
MDC_IDC_STAT_EPISODE_TOTAL_COUNT: 3
MDC_IDC_STAT_EPISODE_TOTAL_COUNT_DTM_END: NORMAL
MDC_IDC_STAT_EPISODE_TOTAL_COUNT_DTM_START: NORMAL
MDC_IDC_STAT_EPISODE_TYPE: NORMAL
MDC_IDC_STAT_TACHYTHERAPY_ATP_DELIVERED_RECENT: 0
MDC_IDC_STAT_TACHYTHERAPY_ATP_DELIVERED_TOTAL: 0
MDC_IDC_STAT_TACHYTHERAPY_RECENT_DTM_END: NORMAL
MDC_IDC_STAT_TACHYTHERAPY_RECENT_DTM_START: NORMAL
MDC_IDC_STAT_TACHYTHERAPY_SHOCKS_ABORTED_RECENT: 0
MDC_IDC_STAT_TACHYTHERAPY_SHOCKS_ABORTED_TOTAL: 0
MDC_IDC_STAT_TACHYTHERAPY_SHOCKS_DELIVERED_RECENT: 0
MDC_IDC_STAT_TACHYTHERAPY_SHOCKS_DELIVERED_TOTAL: 1
MDC_IDC_STAT_TACHYTHERAPY_TOTAL_DTM_END: NORMAL
MDC_IDC_STAT_TACHYTHERAPY_TOTAL_DTM_START: NORMAL

## 2024-09-29 PROCEDURE — 93296 REM INTERROG EVL PM/IDS: CPT | Performed by: INTERNAL MEDICINE

## 2024-09-29 PROCEDURE — 93295 DEV INTERROG REMOTE 1/2/MLT: CPT | Performed by: INTERNAL MEDICINE

## 2024-10-02 ENCOUNTER — OFFICE VISIT (OUTPATIENT)
Dept: INTERNAL MEDICINE | Facility: CLINIC | Age: 84
End: 2024-10-02
Payer: MEDICARE

## 2024-10-02 VITALS
WEIGHT: 275 LBS | BODY MASS INDEX: 39.37 KG/M2 | TEMPERATURE: 97.9 F | DIASTOLIC BLOOD PRESSURE: 79 MMHG | RESPIRATION RATE: 14 BRPM | HEART RATE: 77 BPM | SYSTOLIC BLOOD PRESSURE: 147 MMHG | OXYGEN SATURATION: 98 % | HEIGHT: 70 IN

## 2024-10-02 DIAGNOSIS — I10 PRIMARY HYPERTENSION: ICD-10-CM

## 2024-10-02 DIAGNOSIS — I25.10 CHRONIC CORONARY ARTERY DISEASE: Primary | ICD-10-CM

## 2024-10-02 DIAGNOSIS — E78.2 MIXED HYPERLIPIDEMIA: ICD-10-CM

## 2024-10-02 DIAGNOSIS — R31.29 MICROSCOPIC HEMATURIA: ICD-10-CM

## 2024-10-02 DIAGNOSIS — I10 ESSENTIAL HYPERTENSION: ICD-10-CM

## 2024-10-02 DIAGNOSIS — I50.22 CHRONIC HFREF (HEART FAILURE WITH REDUCED EJECTION FRACTION) (H): ICD-10-CM

## 2024-10-02 DIAGNOSIS — T78.3XXS ANGIOEDEMA, SEQUELA: ICD-10-CM

## 2024-10-02 DIAGNOSIS — D69.6 THROMBOCYTOPENIA (H): ICD-10-CM

## 2024-10-02 DIAGNOSIS — E78.5 HYPERLIPIDEMIA WITH TARGET LOW DENSITY LIPOPROTEIN (LDL) CHOLESTEROL LESS THAN 70 MG/DL: ICD-10-CM

## 2024-10-02 LAB
ALBUMIN UR-MCNC: NEGATIVE MG/DL
ANION GAP SERPL CALCULATED.3IONS-SCNC: 11 MMOL/L (ref 7–15)
APPEARANCE UR: CLEAR
BACTERIA #/AREA URNS HPF: ABNORMAL /HPF
BILIRUB UR QL STRIP: NEGATIVE
BUN SERPL-MCNC: 17.8 MG/DL (ref 8–23)
CALCIUM SERPL-MCNC: 9.2 MG/DL (ref 8.8–10.4)
CHLORIDE SERPL-SCNC: 102 MMOL/L (ref 98–107)
COLOR UR AUTO: YELLOW
CREAT SERPL-MCNC: 1 MG/DL (ref 0.67–1.17)
EGFRCR SERPLBLD CKD-EPI 2021: 73 ML/MIN/1.73M2
GLUCOSE SERPL-MCNC: 104 MG/DL (ref 70–99)
GLUCOSE UR STRIP-MCNC: NEGATIVE MG/DL
HCO3 SERPL-SCNC: 26 MMOL/L (ref 22–29)
HGB UR QL STRIP: ABNORMAL
KETONES UR STRIP-MCNC: NEGATIVE MG/DL
LEUKOCYTE ESTERASE UR QL STRIP: NEGATIVE
MUCOUS THREADS #/AREA URNS LPF: PRESENT /LPF
NITRATE UR QL: NEGATIVE
PH UR STRIP: 6 [PH] (ref 5–8)
POTASSIUM SERPL-SCNC: 4 MMOL/L (ref 3.4–5.3)
RBC #/AREA URNS AUTO: ABNORMAL /HPF
SODIUM SERPL-SCNC: 139 MMOL/L (ref 135–145)
SP GR UR STRIP: 1.02 (ref 1–1.03)
SPERM #/AREA URNS HPF: PRESENT /HPF
SQUAMOUS #/AREA URNS AUTO: ABNORMAL /LPF
UROBILINOGEN UR STRIP-ACNC: 1 E.U./DL
WBC #/AREA URNS AUTO: ABNORMAL /HPF

## 2024-10-02 PROCEDURE — 90662 IIV NO PRSV INCREASED AG IM: CPT | Performed by: INTERNAL MEDICINE

## 2024-10-02 PROCEDURE — G0008 ADMIN INFLUENZA VIRUS VAC: HCPCS | Performed by: INTERNAL MEDICINE

## 2024-10-02 PROCEDURE — 81001 URINALYSIS AUTO W/SCOPE: CPT | Performed by: INTERNAL MEDICINE

## 2024-10-02 PROCEDURE — 36415 COLL VENOUS BLD VENIPUNCTURE: CPT | Performed by: INTERNAL MEDICINE

## 2024-10-02 PROCEDURE — 80048 BASIC METABOLIC PNL TOTAL CA: CPT | Performed by: INTERNAL MEDICINE

## 2024-10-02 PROCEDURE — 99214 OFFICE O/P EST MOD 30 MIN: CPT | Mod: 25 | Performed by: INTERNAL MEDICINE

## 2024-10-02 RX ORDER — HYDROCHLOROTHIAZIDE 25 MG/1
25 TABLET ORAL DAILY
Qty: 90 TABLET | Refills: 1 | Status: SHIPPED | OUTPATIENT
Start: 2024-10-02

## 2024-10-02 RX ORDER — SIMVASTATIN 40 MG
40 TABLET ORAL AT BEDTIME
Qty: 90 TABLET | Refills: 1 | Status: SHIPPED | OUTPATIENT
Start: 2024-10-02

## 2024-10-02 RX ORDER — SPIRONOLACTONE 25 MG/1
25 TABLET ORAL DAILY
Qty: 90 TABLET | Refills: 4 | Status: SHIPPED | OUTPATIENT
Start: 2024-10-02 | End: 2024-11-04

## 2024-10-02 ASSESSMENT — PAIN SCALES - GENERAL: PAINLEVEL: MODERATE PAIN (5)

## 2024-10-02 NOTE — NURSING NOTE
Pt tolerated injection (Influenza Vaccine 65+: Fluzone HD). Site (Left deltoid) was cleansed with alcohol prior to injections. No pain, burning, swelling or redness at the site of the injection. Patient instructed to remain in clinic for 15 minutes afterwards, and to report any adverse reactions.     Pt discharged to lab in stable condtion

## 2024-10-02 NOTE — PROGRESS NOTES
Office Visit - Follow Up   Chaim Dee   84 year old male    Date of Visit: 10/2/2024    Chief Complaint   Patient presents with    Hypertension     6 MONTH FOLLOW UP        Assessment and Plan   1. Chronic coronary artery disease  Continue with current medication    2. Primary hypertension  Blood pressure high add spironolactone and Jardiance, follow-up labs within a couple of weeks and follow-up with me in clinic in 1 month    3. Hyperlipidemia with target low density lipoprotein (LDL) cholesterol less than 70 mg/dL  Continue statin    4. Chronic HFrEF (heart failure with reduced ejection fraction) (H)  Continue beta-blocker, add spironolactone and Jardiance, not on an ACE inhibitor or ARB due to angioedema  - spironolactone (ALDACTONE) 25 MG tablet; Take 1 tablet (25 mg) by mouth daily.  Dispense: 90 tablet; Refill: 4  - empagliflozin (JARDIANCE) 10 MG TABS tablet; Take 1 tablet (10 mg) by mouth daily.  Dispense: 90 tablet; Refill: 1  - Basic metabolic panel  (Ca, Cl, CO2, Creat, Gluc, K, Na, BUN); Future  - Basic metabolic panel  (Ca, Cl, CO2, Creat, Gluc, K, Na, BUN)    5. Microscopic hematuria  Had recommended a CT scan which she did not have done but will recheck labs  - UA Macroscopic with reflex to Microscopic and Culture - Lab Collect; Future  - UA Macroscopic with reflex to Microscopic and Culture - Lab Collect  - UA Microscopic with Reflex to Culture    6. Angioedema, sequela  Continue with current management    The following high BMI interventions were performed this visit: encouragement to exercise and lifestyle education regarding diet    Return in about 5 weeks (around 11/6/2024) for Follow up, Routine preventive.     History of Present Illness   This 84 year old man comes in for follow-up.  He had an episode of lip and tongue swelling responded to Benadryl after eating strawberries.  Otherwise generally feeling okay.  Blood pressures have been a little bit higher       Physical Exam  "  General Appearance:   Acute distress    BP (!) 147/79 (BP Location: Left arm, Patient Position: Sitting, Cuff Size: Adult Large)   Pulse 77   Temp 97.9  F (36.6  C)   Resp 14   Ht 1.778 m (5' 10\")   Wt 124.7 kg (275 lb)   SpO2 98%   BMI 39.46 kg/m      Rate controlled rhythm regular lungs clear to auscultation bilaterally minimal edema     Additional Information   Current Outpatient Medications   Medication Sig Dispense Refill    acetaminophen (TYLENOL) 325 MG tablet [ACETAMINOPHEN (TYLENOL) 325 MG TABLET] Take 2 tablets (650 mg total) by mouth every 4 (four) hours as needed.  0    clopidogrel (PLAVIX) 75 MG tablet TAKE 1 TABLET(75 MG) BY MOUTH DAILY 90 tablet 1    diphenhydrAMINE (BENADRYL) 25 MG tablet Take 1-2 tablets (25-50 mg) by mouth nightly as needed (angioedema)      empagliflozin (JARDIANCE) 10 MG TABS tablet Take 1 tablet (10 mg) by mouth daily. 90 tablet 1    famotidine (PEPCID) 20 MG tablet Take 1 tablet (20 mg) by mouth At Bedtime 180 tablet 3    fexofenadine (ALLEGRA) 180 MG tablet Take 1 tablet (180 mg) by mouth daily 90 tablet 3    hydrochlorothiazide (HYDRODIURIL) 25 MG tablet TAKE 1 TABLET BY MOUTH DAILY 90 tablet 1    metoprolol succinate ER (TOPROL XL) 100 MG 24 hr tablet TAKE 1 TABLET(100 MG) BY MOUTH DAILY 90 tablet 1    nitroglycerin (NITROSTAT) 0.4 MG SL tablet [NITROGLYCERIN (NITROSTAT) 0.4 MG SL TABLET] Place 1 tablet (0.4 mg total) under the tongue as needed. Use as directed. 30 tablet 0    simvastatin (ZOCOR) 40 MG tablet Take 1 tablet (40 mg) by mouth at bedtime 90 tablet 1    spironolactone (ALDACTONE) 25 MG tablet Take 1 tablet (25 mg) by mouth daily. 90 tablet 4    vitamin D3 (CHOLECALCIFEROL) 50 mcg (2000 units) tablet Take 1 tablet (50 mcg) by mouth daily         Time:     The longitudinal plan of care for the diagnosis(es)/condition(s) as documented were addressed during this visit. Due to the added complexity in care, I will continue to support Chaim in the subsequent " management and with ongoing continuity of care.     Luis Elena MD  Answers submitted by the patient for this visit:  Hypertension Visit (Submitted on 9/30/2024)  Chief Complaint: Chronic problems general questions HPI Form  Do you check your blood pressure regularly outside of the clinic?: No  Are your blood pressures ever more than 140 on the top number (systolic) OR more than 90 on the bottom number (diastolic)? (For example, greater than 140/90): No  Are you following a low salt diet?: No  General Questionnaire (Submitted on 9/30/2024)  Chief Complaint: Chronic problems general questions HPI Form  How many servings of fruits and vegetables do you eat daily?: 2-3  On average, how many sweetened beverages do you drink each day (Examples: soda, juice, sweet tea, etc.  Do NOT count diet or artificially sweetened beverages)?: 2  How many minutes a day do you exercise enough to make your heart beat faster?: 9 or less  How many days a week do you exercise enough to make your heart beat faster?: 3 or less  How many days per week do you miss taking your medication?: 0

## 2024-10-03 ENCOUNTER — PATIENT OUTREACH (OUTPATIENT)
Dept: CARE COORDINATION | Facility: CLINIC | Age: 84
End: 2024-10-03
Payer: MEDICARE

## 2024-10-09 DIAGNOSIS — I10 ESSENTIAL HYPERTENSION: ICD-10-CM

## 2024-10-09 RX ORDER — METOPROLOL SUCCINATE 100 MG/1
100 TABLET, EXTENDED RELEASE ORAL DAILY
Qty: 90 TABLET | Refills: 1 | Status: SHIPPED | OUTPATIENT
Start: 2024-10-09

## 2024-10-17 ENCOUNTER — PATIENT OUTREACH (OUTPATIENT)
Dept: CARE COORDINATION | Facility: CLINIC | Age: 84
End: 2024-10-17
Payer: MEDICARE

## 2024-11-04 ENCOUNTER — OFFICE VISIT (OUTPATIENT)
Dept: INTERNAL MEDICINE | Facility: CLINIC | Age: 84
End: 2024-11-04
Payer: MEDICARE

## 2024-11-04 VITALS
OXYGEN SATURATION: 96 % | TEMPERATURE: 97.9 F | HEIGHT: 70 IN | RESPIRATION RATE: 20 BRPM | HEART RATE: 80 BPM | SYSTOLIC BLOOD PRESSURE: 132 MMHG | DIASTOLIC BLOOD PRESSURE: 70 MMHG | BODY MASS INDEX: 38.85 KG/M2 | WEIGHT: 271.4 LBS

## 2024-11-04 DIAGNOSIS — Z95.810 ICD (IMPLANTABLE CARDIOVERTER-DEFIBRILLATOR), SINGLE, IN SITU: ICD-10-CM

## 2024-11-04 DIAGNOSIS — I25.10 CHRONIC CORONARY ARTERY DISEASE: ICD-10-CM

## 2024-11-04 DIAGNOSIS — I47.29 PAROXYSMAL VENTRICULAR TACHYCARDIA (H): ICD-10-CM

## 2024-11-04 DIAGNOSIS — I50.22 CHRONIC HFREF (HEART FAILURE WITH REDUCED EJECTION FRACTION) (H): ICD-10-CM

## 2024-11-04 DIAGNOSIS — E66.01 CLASS 2 SEVERE OBESITY DUE TO EXCESS CALORIES WITH SERIOUS COMORBIDITY AND BODY MASS INDEX (BMI) OF 38.0 TO 38.9 IN ADULT (H): ICD-10-CM

## 2024-11-04 DIAGNOSIS — E78.5 HYPERLIPIDEMIA WITH TARGET LOW DENSITY LIPOPROTEIN (LDL) CHOLESTEROL LESS THAN 70 MG/DL: ICD-10-CM

## 2024-11-04 DIAGNOSIS — G47.33 OSA (OBSTRUCTIVE SLEEP APNEA): ICD-10-CM

## 2024-11-04 DIAGNOSIS — T78.3XXS ANGIOEDEMA, SEQUELA: ICD-10-CM

## 2024-11-04 DIAGNOSIS — M15.0 PRIMARY OSTEOARTHRITIS INVOLVING MULTIPLE JOINTS: ICD-10-CM

## 2024-11-04 DIAGNOSIS — I10 PRIMARY HYPERTENSION: ICD-10-CM

## 2024-11-04 DIAGNOSIS — E66.812 CLASS 2 SEVERE OBESITY DUE TO EXCESS CALORIES WITH SERIOUS COMORBIDITY AND BODY MASS INDEX (BMI) OF 38.0 TO 38.9 IN ADULT (H): ICD-10-CM

## 2024-11-04 DIAGNOSIS — D69.6 THROMBOCYTOPENIA (H): ICD-10-CM

## 2024-11-04 DIAGNOSIS — Z00.00 ANNUAL PHYSICAL EXAM: Primary | ICD-10-CM

## 2024-11-04 DIAGNOSIS — K21.9 GASTROESOPHAGEAL REFLUX DISEASE WITHOUT ESOPHAGITIS: ICD-10-CM

## 2024-11-04 DIAGNOSIS — Z66 DNR (DO NOT RESUSCITATE): ICD-10-CM

## 2024-11-04 LAB
ALBUMIN SERPL BCG-MCNC: 4.1 G/DL (ref 3.5–5.2)
ALP SERPL-CCNC: 54 U/L (ref 40–150)
ALT SERPL W P-5'-P-CCNC: 18 U/L (ref 0–70)
ANION GAP SERPL CALCULATED.3IONS-SCNC: 14 MMOL/L (ref 7–15)
AST SERPL W P-5'-P-CCNC: 22 U/L (ref 0–45)
BILIRUB SERPL-MCNC: 1.4 MG/DL
BUN SERPL-MCNC: 19.3 MG/DL (ref 8–23)
CALCIUM SERPL-MCNC: 9.3 MG/DL (ref 8.8–10.4)
CHLORIDE SERPL-SCNC: 100 MMOL/L (ref 98–107)
CHOLEST SERPL-MCNC: 110 MG/DL
CREAT SERPL-MCNC: 0.97 MG/DL (ref 0.67–1.17)
EGFRCR SERPLBLD CKD-EPI 2021: 76 ML/MIN/1.73M2
ERYTHROCYTE [DISTWIDTH] IN BLOOD BY AUTOMATED COUNT: 14.5 % (ref 10–15)
FASTING STATUS PATIENT QL REPORTED: YES
FASTING STATUS PATIENT QL REPORTED: YES
GLUCOSE SERPL-MCNC: 96 MG/DL (ref 70–99)
HCO3 SERPL-SCNC: 24 MMOL/L (ref 22–29)
HCT VFR BLD AUTO: 44.2 % (ref 40–53)
HDLC SERPL-MCNC: 39 MG/DL
HGB BLD-MCNC: 14.5 G/DL (ref 13.3–17.7)
LDLC SERPL CALC-MCNC: 56 MG/DL
MCH RBC QN AUTO: 30.3 PG (ref 26.5–33)
MCHC RBC AUTO-ENTMCNC: 32.8 G/DL (ref 31.5–36.5)
MCV RBC AUTO: 93 FL (ref 78–100)
NONHDLC SERPL-MCNC: 71 MG/DL
PLATELET # BLD AUTO: 213 10E3/UL (ref 150–450)
POTASSIUM SERPL-SCNC: 4.2 MMOL/L (ref 3.4–5.3)
PROT SERPL-MCNC: 6.9 G/DL (ref 6.4–8.3)
RBC # BLD AUTO: 4.78 10E6/UL (ref 4.4–5.9)
SODIUM SERPL-SCNC: 138 MMOL/L (ref 135–145)
TRIGL SERPL-MCNC: 74 MG/DL
WBC # BLD AUTO: 9.7 10E3/UL (ref 4–11)

## 2024-11-04 PROCEDURE — 80061 LIPID PANEL: CPT | Performed by: INTERNAL MEDICINE

## 2024-11-04 PROCEDURE — G0439 PPPS, SUBSEQ VISIT: HCPCS | Performed by: INTERNAL MEDICINE

## 2024-11-04 PROCEDURE — 36415 COLL VENOUS BLD VENIPUNCTURE: CPT | Performed by: INTERNAL MEDICINE

## 2024-11-04 PROCEDURE — 99497 ADVNCD CARE PLAN 30 MIN: CPT | Performed by: INTERNAL MEDICINE

## 2024-11-04 PROCEDURE — 91320 SARSCV2 VAC 30MCG TRS-SUC IM: CPT | Performed by: INTERNAL MEDICINE

## 2024-11-04 PROCEDURE — 90480 ADMN SARSCOV2 VAC 1/ONLY CMP: CPT | Performed by: INTERNAL MEDICINE

## 2024-11-04 PROCEDURE — 85027 COMPLETE CBC AUTOMATED: CPT | Performed by: INTERNAL MEDICINE

## 2024-11-04 PROCEDURE — 80053 COMPREHEN METABOLIC PANEL: CPT | Performed by: INTERNAL MEDICINE

## 2024-11-04 PROCEDURE — 99214 OFFICE O/P EST MOD 30 MIN: CPT | Mod: 25 | Performed by: INTERNAL MEDICINE

## 2024-11-04 NOTE — PATIENT INSTRUCTIONS
Patient Education   Preventive Care Advice   This is general advice given by our system to help you stay healthy. However, your care team may have specific advice just for you. Please talk to your care team about your preventive care needs.  Nutrition  Eat 5 or more servings of fruits and vegetables each day.  Try wheat bread, brown rice and whole grain pasta (instead of white bread, rice, and pasta).  Get enough calcium and vitamin D. Check the label on foods and aim for 100% of the RDA (recommended daily allowance).  Lifestyle  Exercise at least 150 minutes each week  (30 minutes a day, 5 days a week).  Do muscle strengthening activities 2 days a week. These help control your weight and prevent disease.  No smoking.  Wear sunscreen to prevent skin cancer.  Have a dental exam and cleaning every 6 months.  Yearly exams  See your health care team every year to talk about:  Any changes in your health.  Any medicines your care team has prescribed.  Preventive care, family planning, and ways to prevent chronic diseases.  Shots (vaccines)   HPV shots (up to age 26), if you've never had them before.  Hepatitis B shots (up to age 59), if you've never had them before.  COVID-19 shot: Get this shot when it's due.  Flu shot: Get a flu shot every year.  Tetanus shot: Get a tetanus shot every 10 years.  Pneumococcal, hepatitis A, and RSV shots: Ask your care team if you need these based on your risk.  Shingles shot (for age 50 and up)  General health tests  Diabetes screening:  Starting at age 35, Get screened for diabetes at least every 3 years.  If you are younger than age 35, ask your care team if you should be screened for diabetes.  Cholesterol test: At age 39, start having a cholesterol test every 5 years, or more often if advised.  Bone density scan (DEXA): At age 50, ask your care team if you should have this scan for osteoporosis (brittle bones).  Hepatitis C: Get tested at least once in your life.  STIs (sexually  transmitted infections)  Before age 24: Ask your care team if you should be screened for STIs.  After age 24: Get screened for STIs if you're at risk. You are at risk for STIs (including HIV) if:  You are sexually active with more than one person.  You don't use condoms every time.  You or a partner was diagnosed with a sexually transmitted infection.  If you are at risk for HIV, ask about PrEP medicine to prevent HIV.  Get tested for HIV at least once in your life, whether you are at risk for HIV or not.  Cancer screening tests  Cervical cancer screening: If you have a cervix, begin getting regular cervical cancer screening tests starting at age 21.  Breast cancer scan (mammogram): If you've ever had breasts, begin having regular mammograms starting at age 40. This is a scan to check for breast cancer.  Colon cancer screening: It is important to start screening for colon cancer at age 45.  Have a colonoscopy test every 10 years (or more often if you're at risk) Or, ask your provider about stool tests like a FIT test every year or Cologuard test every 3 years.  To learn more about your testing options, visit:   .  For help making a decision, visit:   https://bit.ly/gj06526.  Prostate cancer screening test: If you have a prostate, ask your care team if a prostate cancer screening test (PSA) at age 55 is right for you.  Lung cancer screening: If you are a current or former smoker ages 50 to 80, ask your care team if ongoing lung cancer screenings are right for you.  For informational purposes only. Not to replace the advice of your health care provider. Copyright   2023 [employer]. All rights reserved. Clinically reviewed by the [facility] Transitions Program. [employer] [id number] - REV 01/24.

## 2024-11-04 NOTE — PROGRESS NOTES
Preventive Care Visit  [facility]  Luis Elena MD, Internal Medicine  Nov 4, 2024      SUBJECTIVE:   Chaim is a 84 year old, presenting for the following:  RECHECK        11/4/2024     8:05 AM   Additional Questions   Roomed by Aj HORNE RN     Are you in the first 12 months of your Medicare coverage?  No    HPI        Have you ever done Advance Care Planning? (For example, a Health Directive, POLST, or a discussion with a medical provider or your loved ones about your wishes): No, advance care planning information given to patient to review.  Advanced care planning was discussed at today's visit.  We had a 16-minute discussion today.  He confirms that he is DO NOT RESUSCITATE.  He does have an ICD and is okay with this.  He is not sure if he has a healthcare directive and I did give him the form and we reviewed it.  He will fill it out and bring it back.    Healthy Habits  Ability to successfully perform ADLs: Yes  Hearing impairment: Stable  Home Safety: Safe      10/2/2024     8:36 AM   PHQ-2 ( 1999 Pfizer)   PHQ-2 Score Incomplete         5/1/2023     9:02 AM   PHQ   PHQ-9 Total Score 0   Q9: Thoughts of better off dead/self-harm past 2 weeks Not at all        Patient-reported     Fall Risk: He has had no falls in the last 12 months  Fall risk     Cognitive Screening   1) Repeat 3 items (Leader, Season, Table)    2) Clock draw: NORMAL  3) 3 item recall: Recalls 3 objects  Results: 3 items recalled: COGNITIVE IMPAIRMENT LESS LIKELY    Mini-CogTM Copyright S Jose. Licensed by the author for use in [facility]; reprinted with permission (tom@.[facility]). All rights reserved.      Reviewed and updated as needed this visit by clinical staff   Tobacco  Allergies  Meds              Reviewed and updated as needed this visit by Provider                  Social History     Tobacco Use    Smoking status: Former    Smokeless tobacco: Never   Substance Use Topics    Alcohol use: Yes  "    Alcohol/week: 1.0 standard drink of alcohol     Comment: Alcoholic Drinks/day: rare - only in [place]             11/2/2023     8:44 AM   Alcohol Use   Prescreen: >3 drinks/day or >7 drinks/week? No        Patient-reported   Do you have a current opioid prescription? No  Do you use any other controlled substances or medications that are not prescribed by a provider? None  Current providers sharing in care for this patient include:   Patient Care Team:  Luis Elena MD as PCP - General  Luis Elena MD as Assigned PCP  Loyda Contreras MD as Assigned Heart and Vascular Provider    The following health maintenance items are reviewed in Epic and correct as of today:  Health Maintenance   Topic Date Due    ZOSTER IMMUNIZATION (2 of 2) 12/01/2016    ALT  11/02/2024    LIPID  11/02/2024    BMP  04/02/2025    ANNUAL REVIEW OF HM ORDERS  10/02/2025    FALL RISK ASSESSMENT  10/02/2025    MEDICARE ANNUAL WELLNESS VISIT  11/04/2025    CBC  11/04/2025    DTAP/TDAP/TD IMMUNIZATION (2 - Td or Tdap) 10/06/2026    HF ACTION PLAN  11/04/2027    ADVANCE CARE PLANNING  11/02/2028    TSH W/FREE T4 REFLEX  Completed    PHQ-2 (once per calendar year)  Completed    INFLUENZA VACCINE  Completed    Pneumococcal Vaccine: 65+ Years  Completed    RSV VACCINE  Completed    COVID-19 Vaccine  Completed    HPV IMMUNIZATION  Aged Out    MENINGITIS IMMUNIZATION  Aged Out    RSV MONOCLONAL ANTIBODY  Aged Out     Review of Systems       OBJECTIVE:   /70 (BP Location: Left arm, Patient Position: Sitting, Cuff Size: Adult Regular)   Pulse 80   Temp 97.9  F (36.6  C) (Tympanic)   Resp 20   Ht 1.778 m (5' 10\")   Wt 123.1 kg (271 lb 6.4 oz)   SpO2 96%   BMI 38.94 kg/m     Estimated body mass index is 38.94 kg/m  as calculated from the following:    Height as of this encounter: 1.778 m (5' 10\").    Weight as of this encounter: 123.1 kg (271 lb 6.4 oz).  Physical Exam  EYES: Eyelids, conjunctiva, and sclera were normal. "   HEAD, EARS, NOSE, MOUTH, AND THROAT: Head and face were normal. Hearing was normal to voice and the ears were normal to external exam. Nose appearance was normal and there was no discharge.   NECK: Neck appearance was normal.   RESPIRATORY: Breathing pattern was normal and the chest moved symmetrically.   Lung sounds were normal and there were no abnormal sounds.  CARDIOVASCULAR: Heart rate and rhythm were normal.  S1 and S2 were normal and there were no extra sounds or murmurs.  There was no peripheral edema.  GASTROINTESTINAL: The abdomen was normal in contour  NEUROLOGIC: The patient was alert and oriented to person, place, time, and circumstance. Speech was normal. Cranial nerves were normal. Motor strength was normal for age.  PSYCHIATRIC:  Mood and affect were normal and the patient had normal recent and remote memory. The patient's judgment and insight were normal.    ASSESSMENT / PLAN:   1. Annual physical exam (Primary)  This is an 84-year-old man with issues as discussed below.  Ongoing healthy lifestyle discussed and recommended    2. Chronic HFrEF (heart failure with reduced ejection fraction) (H)  Continue beta-blocker.  He has not tolerated ACE inhibitors and ARB's, SGLT2 inhibitors and MRA's due to angioedema.  That said he appears euvolemic and minimal symptoms.  - CBC with platelets; Future  - Comprehensive metabolic panel; Future  - Lipid panel reflex to direct LDL Fasting; Future  - CBC with platelets  - Comprehensive metabolic panel  - Lipid panel reflex to direct LDL Fasting    3. ICD (implantable cardioverter-defibrillator), single, in situ  4. Paroxysmal ventricular tachycardia (H)  Has seen electrophysiology    5. Chronic coronary artery disease  Continue secondary prevention with clopidogrel and simvastatin    6. Primary hypertension  Blood pressure okay continue same    7. Hyperlipidemia with target low density lipoprotein (LDL) cholesterol less than 70 mg/dL  As above    8. Angioedema,  "sequela  As above, continue daily antihistamines and avoidance of triggers    9. Gastroesophageal reflux disease without esophagitis  Stable    10. FLO - did not tolerate CPAP    11. Primary osteoarthritis involving multiple joints  Stable especially with use of acetaminophen    12. Thrombocytopenia (H)  Chronic stable check labs    13. DNR (do not resuscitate)  As above  - OK ADVANCE CARE PLANNING FIRST 30 MINS    14. Class 2 severe obesity due to excess calories with serious comorbidity and body mass index (BMI) of 38.0 to 38.9 in adult (H)  Counseling  Reviewed preventive health counseling, as reflected in patient instructions  BMI  Estimated body mass index is 38.94 kg/m  as calculated from the following:    Height as of this encounter: 1.778 m (5' 10\").    Weight as of this encounter: 123.1 kg (271 lb 6.4 oz).   Weight management plan: Discussed healthy diet and exercise guidelines      He reports that he has quit smoking. He has never used smokeless tobacco.      Appropriate preventive services were discussed with this patient, including applicable screening as appropriate for fall prevention, nutrition, physical activity, Tobacco-use cessation, weight loss and cognition.  Checklist reviewing preventive services available has been given to the patient.    Reviewed patients plan of care and provided an AVS. The Basic Care Plan (routine screening as documented in Health Maintenance) for Chaim meets the Care Plan requirement. This Care Plan has been established and reviewed with the Patient.          Signed Electronically by: Luis Elena MD    Identified Health Risks  I have reviewed Opioid Use Disorder and Substance Use Disorder risk factors and made any needed referrals.   "

## 2024-11-12 ENCOUNTER — OFFICE VISIT (OUTPATIENT)
Dept: INTERNAL MEDICINE | Facility: CLINIC | Age: 84
End: 2024-11-12
Payer: MEDICARE

## 2024-11-12 VITALS
TEMPERATURE: 96.7 F | DIASTOLIC BLOOD PRESSURE: 82 MMHG | RESPIRATION RATE: 17 BRPM | SYSTOLIC BLOOD PRESSURE: 133 MMHG | OXYGEN SATURATION: 98 % | HEIGHT: 70 IN | BODY MASS INDEX: 38.08 KG/M2 | WEIGHT: 266 LBS | HEART RATE: 98 BPM

## 2024-11-12 DIAGNOSIS — T78.3XXS ANGIOEDEMA, SEQUELA: Primary | ICD-10-CM

## 2024-11-12 DIAGNOSIS — I50.22 CHRONIC HFREF (HEART FAILURE WITH REDUCED EJECTION FRACTION) (H): ICD-10-CM

## 2024-11-12 DIAGNOSIS — I10 PRIMARY HYPERTENSION: ICD-10-CM

## 2024-11-12 PROCEDURE — G2211 COMPLEX E/M VISIT ADD ON: HCPCS | Performed by: INTERNAL MEDICINE

## 2024-11-12 PROCEDURE — 99213 OFFICE O/P EST LOW 20 MIN: CPT | Performed by: INTERNAL MEDICINE

## 2024-11-12 ASSESSMENT — PAIN SCALES - GENERAL: PAINLEVEL_OUTOF10: NO PAIN (0)

## 2024-11-12 NOTE — PROGRESS NOTES
"  Assessment & Plan     1. Angioedema, sequela (Primary)  Resolved, maybe nut related?Will avoid nuts.  Continue current plan    2. Primary hypertension  Well controlled    3. Chronic HFrEF (heart failure with reduced ejection fraction) (H)  Well compensated    MED REC REQUIRED  Post Medication Reconciliation Status:  Discharge medications reconciled, continue medications without change    Elisha Rucker is a 84 year old, presenting for the following health issues:  Follow Up (11/7/24 [facility]Angioedema)      11/12/2024     9:24 AM   Additional Questions   Roomed by Carolyn MIR     [facility] Follow-up Visit:    Hospital/Nursing Home/ Rehab Facility: [facility]  Date of Admission: 11/7/24  Date of Discharge: 11/7/24  Reason(s) for Admission: Angioedema   Was the patient in the ICU or did the patient experience delirium during hospitalization?  No  Do you have any other stressors you would like to discuss with your provider? No    Problems taking medications regularly:  None  Medication changes since discharge: None  Problems adhering to non-medication therapy:  None    Summary of hospitalization:  [facility] discharge summary reviewed  Diagnostic Tests/Treatments reviewed.  Follow up needed: none  Other Healthcare Providers Involved in Patient s Care:         None  Update since discharge: improved.         Plan of care communicated with patient               Objective    /82 (BP Location: Left arm, Patient Position: Sitting, Cuff Size: Adult Large)   Pulse 98   Temp (!) 96.7  F (35.9  C) (Tympanic)   Resp 17   Ht 1.778 m (5' 10\")   Wt 120.7 kg (266 lb)   SpO2 98%   BMI 38.17 kg/m    Body mass index is 38.17 kg/m .  Physical Exam   No angioedema        Signed Electronically by: Luis Elena MD    "

## 2024-12-28 DIAGNOSIS — I25.10 CHRONIC CORONARY ARTERY DISEASE: ICD-10-CM

## 2024-12-30 RX ORDER — CLOPIDOGREL BISULFATE 75 MG/1
75 TABLET ORAL DAILY
Qty: 90 TABLET | Refills: 1 | Status: SHIPPED | OUTPATIENT
Start: 2024-12-30

## 2025-01-13 ENCOUNTER — ANCILLARY PROCEDURE (OUTPATIENT)
Dept: CARDIOLOGY | Facility: CLINIC | Age: 85
End: 2025-01-13
Attending: INTERNAL MEDICINE
Payer: MEDICARE

## 2025-01-13 DIAGNOSIS — I25.5 ISCHEMIC CARDIOMYOPATHY: ICD-10-CM

## 2025-01-13 DIAGNOSIS — Z95.810 ICD (IMPLANTABLE CARDIOVERTER-DEFIBRILLATOR) IN PLACE: ICD-10-CM

## 2025-01-14 ENCOUNTER — TELEPHONE (OUTPATIENT)
Dept: CARDIOLOGY | Facility: CLINIC | Age: 85
End: 2025-01-14
Payer: MEDICARE

## 2025-01-14 NOTE — TELEPHONE ENCOUNTER
Encounter Type: Routine remote ICD transmission.   Device: Medtronic Evera (S)   Pacing %/Programmed:  <1% at VVI 40 bpm.   Lead(s): Stable measurements and trends.   Battery longevity: 2 years, 10 months estimated.   Presenting: VS 86 bpm;   Atrial high rates: n/a. RV lead only.   Anticoagulant: None.   Ventricular High rates: Since 9/26/2024 238 VHR episodes, 1 EGM episode #355 appears to be NSVT, no therapies given; the remaining EGMs appear to be PSVT or RVR with irregular R-R intervals.   Comments: Normal device function.   Plan: Routed to device RN for review. Patient due for annual device check in March. Reminder letter sent. Diana, Device Specialist      Remote check reviewed, increase in V high rate episodes noted back around November 11 through 13.  EGM's show some regular episodes and some quite irregular episodes (per plot graphs) , suggestive of possible atrial fibrillation but difficult to discern without atrial lead.  Leadless EGM has some artifact but also suggestive of AF. (See snapshots below)     Episodes reviewed with patient.  He denies any awareness of irregular or rapid heartbeats but states since it was back in November it is hard to tell.  Of note patient was at [facility] on November 7 for angioedema, but was discharged same day and does not appear to have been on telemetry.  Had follow-up appointment on November 12 with Dr. Elena, but no EKG noted.     Discussed that we will continue to monitor for episodes and to let us know if he has any irregular/rapid heartbeats and so we can run a transmission at that time .  Patient verbalized understanding.    Will review EGM's with the EP provider as well as copy Dr. Contreras his primary cardiologist.  Patient is due to see Dr. Contreras in March we will have scheduling reach out to get that appointment scheduled.     Chanell Gonzalez, RN

## 2025-01-15 LAB
MDC_IDC_EPISODE_DTM: NORMAL
MDC_IDC_EPISODE_DURATION: 0 S
MDC_IDC_EPISODE_DURATION: 0 S
MDC_IDC_EPISODE_DURATION: 1 S
MDC_IDC_EPISODE_DURATION: 124 S
MDC_IDC_EPISODE_DURATION: 182 S
MDC_IDC_EPISODE_DURATION: 185 S
MDC_IDC_EPISODE_DURATION: 189 S
MDC_IDC_EPISODE_DURATION: 2 S
MDC_IDC_EPISODE_DURATION: 258 S
MDC_IDC_EPISODE_DURATION: 3 S
MDC_IDC_EPISODE_DURATION: 622 S
MDC_IDC_EPISODE_DURATION: 940 S
MDC_IDC_EPISODE_ID: 127
MDC_IDC_EPISODE_ID: 165
MDC_IDC_EPISODE_ID: 199
MDC_IDC_EPISODE_ID: 215
MDC_IDC_EPISODE_ID: 257
MDC_IDC_EPISODE_ID: 299
MDC_IDC_EPISODE_ID: 307
MDC_IDC_EPISODE_ID: 341
MDC_IDC_EPISODE_ID: 342
MDC_IDC_EPISODE_ID: 343
MDC_IDC_EPISODE_ID: 344
MDC_IDC_EPISODE_ID: 345
MDC_IDC_EPISODE_ID: 346
MDC_IDC_EPISODE_ID: 347
MDC_IDC_EPISODE_ID: 348
MDC_IDC_EPISODE_ID: 349
MDC_IDC_EPISODE_ID: 350
MDC_IDC_EPISODE_ID: 351
MDC_IDC_EPISODE_ID: 352
MDC_IDC_EPISODE_ID: 353
MDC_IDC_EPISODE_ID: 354
MDC_IDC_EPISODE_ID: 355
MDC_IDC_EPISODE_TYPE: NORMAL
MDC_IDC_LEAD_CONNECTION_STATUS: NORMAL
MDC_IDC_LEAD_IMPLANT_DT: NORMAL
MDC_IDC_LEAD_LOCATION: NORMAL
MDC_IDC_LEAD_LOCATION_DETAIL_1: NORMAL
MDC_IDC_LEAD_MFG: NORMAL
MDC_IDC_LEAD_MODEL: NORMAL
MDC_IDC_LEAD_POLARITY_TYPE: NORMAL
MDC_IDC_LEAD_SERIAL: NORMAL
MDC_IDC_LEAD_SPECIAL_FUNCTION: NORMAL
MDC_IDC_MSMT_BATTERY_DTM: NORMAL
MDC_IDC_MSMT_BATTERY_REMAINING_LONGEVITY: 34 MO
MDC_IDC_MSMT_BATTERY_RRT_TRIGGER: 2.73
MDC_IDC_MSMT_BATTERY_STATUS: NORMAL
MDC_IDC_MSMT_BATTERY_VOLTAGE: 2.95 V
MDC_IDC_MSMT_LEADCHNL_RV_IMPEDANCE_VALUE: 247 OHM
MDC_IDC_MSMT_LEADCHNL_RV_IMPEDANCE_VALUE: 323 OHM
MDC_IDC_MSMT_LEADCHNL_RV_PACING_THRESHOLD_AMPLITUDE: 0.75 V
MDC_IDC_MSMT_LEADCHNL_RV_PACING_THRESHOLD_PULSEWIDTH: 0.4 MS
MDC_IDC_MSMT_LEADCHNL_RV_SENSING_INTR_AMPL: 5.5 MV
MDC_IDC_MSMT_LEADCHNL_RV_SENSING_INTR_AMPL: 5.5 MV
MDC_IDC_PG_IMPLANT_DTM: NORMAL
MDC_IDC_PG_MFG: NORMAL
MDC_IDC_PG_MODEL: NORMAL
MDC_IDC_PG_SERIAL: NORMAL
MDC_IDC_PG_TYPE: NORMAL
MDC_IDC_SESS_CLINIC_NAME: NORMAL
MDC_IDC_SESS_DTM: NORMAL
MDC_IDC_SESS_TYPE: NORMAL
MDC_IDC_SET_BRADY_HYSTRATE: NORMAL
MDC_IDC_SET_BRADY_LOWRATE: 40 {BEATS}/MIN
MDC_IDC_SET_BRADY_MODE: NORMAL
MDC_IDC_SET_LEADCHNL_RV_PACING_AMPLITUDE: 1.5 V
MDC_IDC_SET_LEADCHNL_RV_PACING_ANODE_ELECTRODE_1: NORMAL
MDC_IDC_SET_LEADCHNL_RV_PACING_ANODE_LOCATION_1: NORMAL
MDC_IDC_SET_LEADCHNL_RV_PACING_CAPTURE_MODE: NORMAL
MDC_IDC_SET_LEADCHNL_RV_PACING_CATHODE_ELECTRODE_1: NORMAL
MDC_IDC_SET_LEADCHNL_RV_PACING_CATHODE_LOCATION_1: NORMAL
MDC_IDC_SET_LEADCHNL_RV_PACING_POLARITY: NORMAL
MDC_IDC_SET_LEADCHNL_RV_PACING_PULSEWIDTH: 0.4 MS
MDC_IDC_SET_LEADCHNL_RV_SENSING_ANODE_ELECTRODE_1: NORMAL
MDC_IDC_SET_LEADCHNL_RV_SENSING_ANODE_LOCATION_1: NORMAL
MDC_IDC_SET_LEADCHNL_RV_SENSING_CATHODE_ELECTRODE_1: NORMAL
MDC_IDC_SET_LEADCHNL_RV_SENSING_CATHODE_LOCATION_1: NORMAL
MDC_IDC_SET_LEADCHNL_RV_SENSING_POLARITY: NORMAL
MDC_IDC_SET_LEADCHNL_RV_SENSING_SENSITIVITY: 0.3 MV
MDC_IDC_SET_ZONE_DETECTION_BEATS_DENOMINATOR: 16 {BEATS}
MDC_IDC_SET_ZONE_DETECTION_BEATS_DENOMINATOR: 28 {BEATS}
MDC_IDC_SET_ZONE_DETECTION_BEATS_DENOMINATOR: 32 {BEATS}
MDC_IDC_SET_ZONE_DETECTION_BEATS_NUMERATOR: 16 {BEATS}
MDC_IDC_SET_ZONE_DETECTION_BEATS_NUMERATOR: 24 {BEATS}
MDC_IDC_SET_ZONE_DETECTION_BEATS_NUMERATOR: 28 {BEATS}
MDC_IDC_SET_ZONE_DETECTION_INTERVAL: 240 MS
MDC_IDC_SET_ZONE_DETECTION_INTERVAL: 320 MS
MDC_IDC_SET_ZONE_DETECTION_INTERVAL: 360 MS
MDC_IDC_SET_ZONE_DETECTION_INTERVAL: 400 MS
MDC_IDC_SET_ZONE_STATUS: NORMAL
MDC_IDC_SET_ZONE_TYPE: NORMAL
MDC_IDC_SET_ZONE_VENDOR_TYPE: NORMAL
MDC_IDC_STAT_BRADY_DTM_END: NORMAL
MDC_IDC_STAT_BRADY_DTM_START: NORMAL
MDC_IDC_STAT_BRADY_RV_PERCENT_PACED: 0.5 %
MDC_IDC_STAT_EPISODE_RECENT_COUNT: 0
MDC_IDC_STAT_EPISODE_RECENT_COUNT: 238
MDC_IDC_STAT_EPISODE_RECENT_COUNT_DTM_END: NORMAL
MDC_IDC_STAT_EPISODE_RECENT_COUNT_DTM_START: NORMAL
MDC_IDC_STAT_EPISODE_TOTAL_COUNT: 0
MDC_IDC_STAT_EPISODE_TOTAL_COUNT: 1
MDC_IDC_STAT_EPISODE_TOTAL_COUNT: 3
MDC_IDC_STAT_EPISODE_TOTAL_COUNT: 342
MDC_IDC_STAT_EPISODE_TOTAL_COUNT_DTM_END: NORMAL
MDC_IDC_STAT_EPISODE_TOTAL_COUNT_DTM_START: NORMAL
MDC_IDC_STAT_EPISODE_TYPE: NORMAL
MDC_IDC_STAT_TACHYTHERAPY_ATP_DELIVERED_RECENT: 0
MDC_IDC_STAT_TACHYTHERAPY_ATP_DELIVERED_TOTAL: 0
MDC_IDC_STAT_TACHYTHERAPY_RECENT_DTM_END: NORMAL
MDC_IDC_STAT_TACHYTHERAPY_RECENT_DTM_START: NORMAL
MDC_IDC_STAT_TACHYTHERAPY_SHOCKS_ABORTED_RECENT: 0
MDC_IDC_STAT_TACHYTHERAPY_SHOCKS_ABORTED_TOTAL: 0
MDC_IDC_STAT_TACHYTHERAPY_SHOCKS_DELIVERED_RECENT: 0
MDC_IDC_STAT_TACHYTHERAPY_SHOCKS_DELIVERED_TOTAL: 1
MDC_IDC_STAT_TACHYTHERAPY_TOTAL_DTM_END: NORMAL
MDC_IDC_STAT_TACHYTHERAPY_TOTAL_DTM_START: NORMAL

## 2025-01-15 PROCEDURE — 93296 REM INTERROG EVL PM/IDS: CPT | Performed by: INTERNAL MEDICINE

## 2025-01-15 PROCEDURE — 93295 DEV INTERROG REMOTE 1/2/MLT: CPT | Performed by: INTERNAL MEDICINE

## 2025-01-20 ENCOUNTER — TELEPHONE (OUTPATIENT)
Dept: CARDIOLOGY | Facility: CLINIC | Age: 85
End: 2025-01-20
Payer: MEDICARE

## 2025-01-20 DIAGNOSIS — Z95.810 ICD (IMPLANTABLE CARDIOVERTER-DEFIBRILLATOR) IN PLACE: Primary | ICD-10-CM

## 2025-01-20 DIAGNOSIS — I10 ESSENTIAL HYPERTENSION: ICD-10-CM

## 2025-01-20 DIAGNOSIS — R94.31 ABNORMAL ELECTROCARDIOGRAM (ECG) (EKG): ICD-10-CM

## 2025-01-20 RX ORDER — METOPROLOL SUCCINATE 100 MG/1
150 TABLET, EXTENDED RELEASE ORAL DAILY
Qty: 90 TABLET | Refills: 1 | Status: SHIPPED | OUTPATIENT
Start: 2025-01-20

## 2025-01-20 NOTE — TELEPHONE ENCOUNTER
Noted. Placed orders for increase in Metoprolol, Echo, and EP-RAMESH apt.     1st Attempt to contact pt, voicemail message was left with contact information and instructing pt to call back.    Additionally, called shana Spears) and LVM to have Chaim call device clinic back.    Routed to device  to schedule EP-RAMESH visit, echo, and annual device check.    Ramsey Cross RN on 1/20/2025 at 1:55 PM       Spoke with shana (Maggi) who said she would get a hold of patient and have him call the Device clinic.     Ramsey Cross RN on 1/20/2025 at 3:06 PM    Spoke with patient and updated him with recommendations to increase his Metoprolol to 150 mg/day. Patient agreed to break some 100 mg tabs in half and add them to his daily dose of 100 mg to equal recommended does of 150 mg until he is able to fill the prescription at his pharmacy.     Transferred patient to Device scheduling where he was scheduled Echo (2/11/2025), device check, and apt with ROSARIO Weems (2/19/2025).        Ramsey Cross RN on 1/21/2025 at 9:44 AM

## 2025-01-22 ENCOUNTER — ANCILLARY PROCEDURE (OUTPATIENT)
Dept: ULTRASOUND IMAGING | Facility: CLINIC | Age: 85
End: 2025-01-22
Attending: INTERNAL MEDICINE
Payer: MEDICARE

## 2025-01-22 DIAGNOSIS — J39.8 TRACHEAL DEVIATION: ICD-10-CM

## 2025-01-22 PROCEDURE — 76536 US EXAM OF HEAD AND NECK: CPT | Mod: GC | Performed by: RADIOLOGY

## 2025-02-05 ENCOUNTER — OFFICE VISIT (OUTPATIENT)
Dept: INTERNAL MEDICINE | Facility: CLINIC | Age: 85
End: 2025-02-05
Payer: MEDICARE

## 2025-02-05 VITALS
TEMPERATURE: 96.9 F | OXYGEN SATURATION: 95 % | RESPIRATION RATE: 21 BRPM | SYSTOLIC BLOOD PRESSURE: 135 MMHG | HEIGHT: 70 IN | DIASTOLIC BLOOD PRESSURE: 68 MMHG | HEART RATE: 69 BPM | WEIGHT: 260.4 LBS | BODY MASS INDEX: 37.28 KG/M2

## 2025-02-05 DIAGNOSIS — E66.812 CLASS 2 SEVERE OBESITY DUE TO EXCESS CALORIES WITH SERIOUS COMORBIDITY AND BODY MASS INDEX (BMI) OF 38.0 TO 38.9 IN ADULT (H): ICD-10-CM

## 2025-02-05 DIAGNOSIS — I10 PRIMARY HYPERTENSION: ICD-10-CM

## 2025-02-05 DIAGNOSIS — I50.22 CHRONIC HFREF (HEART FAILURE WITH REDUCED EJECTION FRACTION) (H): ICD-10-CM

## 2025-02-05 DIAGNOSIS — I47.29 PAROXYSMAL VENTRICULAR TACHYCARDIA (H): ICD-10-CM

## 2025-02-05 DIAGNOSIS — I48.0 PAROXYSMAL ATRIAL FIBRILLATION (H): Primary | ICD-10-CM

## 2025-02-05 DIAGNOSIS — E66.01 CLASS 2 SEVERE OBESITY DUE TO EXCESS CALORIES WITH SERIOUS COMORBIDITY AND BODY MASS INDEX (BMI) OF 38.0 TO 38.9 IN ADULT (H): ICD-10-CM

## 2025-02-05 PROCEDURE — 99214 OFFICE O/P EST MOD 30 MIN: CPT | Performed by: INTERNAL MEDICINE

## 2025-02-05 PROCEDURE — G2211 COMPLEX E/M VISIT ADD ON: HCPCS | Performed by: INTERNAL MEDICINE

## 2025-02-05 ASSESSMENT — PAIN SCALES - GENERAL
PAINLEVEL_OUTOF10: NO PAIN (0)
PAINLEVEL_OUTOF10: NO PAIN (0)

## 2025-02-05 NOTE — Clinical Note
Hi Dr. Contreras,  I noticed that Rod Villanueva (Chaim JORDAN Luiz) has a few episodes of afib noted on pacemaker interrogation.  Dr. Phan recommended increasing metoprolol.  Rod Villanueva is not on anticoagulation and I'm wondering if you would recommend anticoagulation based on the afib findings.  I know that sometimes when the afib burden is low and a mechanism is in place to monitor (ICD), then sometimes anticoagulation is deferred.  Thanks!  Harley Elena

## 2025-02-11 ENCOUNTER — HOSPITAL ENCOUNTER (OUTPATIENT)
Dept: CARDIOLOGY | Facility: HOSPITAL | Age: 85
Discharge: HOME | End: 2025-02-11
Attending: INTERNAL MEDICINE
Payer: MEDICARE

## 2025-02-11 DIAGNOSIS — R94.31 ABNORMAL ELECTROCARDIOGRAM (ECG) (EKG): ICD-10-CM

## 2025-02-11 DIAGNOSIS — Z95.810 ICD (IMPLANTABLE CARDIOVERTER-DEFIBRILLATOR) IN PLACE: ICD-10-CM

## 2025-02-11 LAB — LVEF ECHO: NORMAL

## 2025-02-11 PROCEDURE — 255N000002 HC RX 255 OP 636: Performed by: INTERNAL MEDICINE

## 2025-02-11 PROCEDURE — 93306 TTE W/DOPPLER COMPLETE: CPT | Mod: 26 | Performed by: INTERNAL MEDICINE

## 2025-02-11 RX ADMIN — PERFLUTREN 3 ML: 6.52 INJECTION, SUSPENSION INTRAVENOUS at 11:27

## 2025-02-18 DIAGNOSIS — I10 ESSENTIAL HYPERTENSION: ICD-10-CM

## 2025-02-18 DIAGNOSIS — I25.10 CORONARY ARTERY DISEASE INVOLVING NATIVE CORONARY ARTERY OF NATIVE HEART WITHOUT ANGINA PECTORIS: ICD-10-CM

## 2025-02-18 DIAGNOSIS — I47.29 NSVT (NONSUSTAINED VENTRICULAR TACHYCARDIA) (H): ICD-10-CM

## 2025-02-18 DIAGNOSIS — E78.5 DYSLIPIDEMIA: ICD-10-CM

## 2025-02-18 DIAGNOSIS — R94.31 ABNORMAL ELECTROCARDIOGRAM (ECG) (EKG): ICD-10-CM

## 2025-02-18 DIAGNOSIS — Z95.810 ICD (IMPLANTABLE CARDIOVERTER-DEFIBRILLATOR) IN PLACE: Primary | ICD-10-CM

## 2025-02-18 DIAGNOSIS — I25.5 ISCHEMIC CARDIOMYOPATHY: ICD-10-CM

## 2025-02-18 NOTE — PROGRESS NOTES
Smita Menjivar RN  2/18/2025 11:03 AM CST       Please confirm if this patient's device is   MRI conditional.  Thank you,  Smita  ----- Message -----  From: Loyda Contreras MD  Sent: 2/18/2025  10:17 AM CST  To: Smita Menjivar RN; MD aBldo Grewal,  Please see note from Rohan below.  He had messaged me looking for some advice as to how to proceed given echocardiogram findings.  I believe that both his generator and electrodes are MRI conditional though can we confirm with the Device Clinic.  If so, would recommend pursuing regadenoson stress MRI to not only evaluate for possible ischemia, but also assess for scar & viability.  Ultimately, if MRI conditional device/electrodes and Chaim is amenable; lets set that up.  Thanks so much!    Rohan Phan MD  2/14/2025  3:40 PM CST       Baldo Boggs,  This patient had an echocardiogram based on increase number of nonsustained VT runs.  Patient with normally functioning single-chamber ICD.  No treated episodes of sustained VT.  His echo demonstrates new wall motion abnormality.  I will defer workup of this new finding to you.  Rohan

## 2025-02-18 NOTE — PROGRESS NOTES
"[facility]  Cardiac Electrophysiology  [address]  [place] [address]   Office: [phone]  Fax: [phone]     HEART CARE ELECTROPHYSIOLOGY FOLLOW UP    Primary Care: Luis Elena MD      Assessment/Recommendations     Paroxysmal atrial fibrillation:   Seen on routine remote ICD transmission 11/14/2025.  Was reviewed by Dr. Phan, had multiple detected episodes of \"NSVT\" between November 12-17.  He reviewed and all but 1 demonstrate atrial fibrillation with RVR.  Total burden number of days was less than 45 minutes of A-fib.  He also had a single 9 beat run of nonsustained VT on November 17.  Metoprolol succinate was increased to 150 mg daily.  He was recommended for echocardiogram to assess LV function. MRI ordered by Dr. Contreras to further assess NSVT.     [id number] score of at least 5 for age >75, CAD, cardiomyopathy, hypertension    NSVT status post ICD implanted 2/12/2018: device functioning appropriately    CAD: no anginal symptoms    Cardiomyopathy: appears euvolemic today    Hypertension: controlled    Plan:  Continue metoprolol 150 mg daily.  We discussed starting DOAC given his A-fib burden. Per AHA guidelines, would be reasonable to start given CHADS of 5 and burden lasting 45 minutes. He would like to discuss with his PCP, I will also send a message to his PCP. Provided free 30 day coupon for eliquis.   May transfer his care to [facility], for now can continue routine remote monitoring. Can follow up if more AF occurs.        History of Present Illness/Subjective    Chaim Dee is a 84 year old male with past medical history significant for coronary artery disease (remote anterior MI 1999 with stenting of LAD and circumflex), NSVT status post ICD implant 2/12/2018, mild HFrEF, hypertension, seen today after brief episodes of paroxysmal atrial fibrillation on routine ICD remote on routine ICD remote transmission.     Overall feels well.  He was " not able to elaborate on symptoms of his atrial fibrillation, if he had any.  It appears he was asymptomatic.  He was recently getting over pneumonia, which she feels better from now.  We had discussed initiation of a DOAC, but he would like to talk with his primary first, which is reasonable.  He denies chest discomfort, palpitations, shortness of breath, lightheadedness/dizziness, pedal edema, or syncope.       Data Review     Arrhythmia hx  Sx: Asymptomatic  Dx/date: On device check 1/14/2025  Rate control: Metoprolol 150 mg  AAD: None  DCCV: None  Ablation: None  [id number] 5  OAC: None yet, provided with free 30-day card    EKG personally reviewed:  5/29/2019: Sinus rhythm 76 bpm, RBBB, LAFB  11/7/2018: Sinus rhythm 91 bpm, incomplete RBBB      TTE 2/11/25:  Technically difficult study.Extremely difficult acoustic windows despite the  use of contrast for endcardial border definition.     1. The left ventricle is not well visualized. Left ventricular function is  decreased. The ejection fraction is 45-50% (mildly reduced). There is severe  hypokinesis to akinesis of the mid to apical septal and mid to apical anterior  wall segments.  2. The right ventricle is not well visualized. Normal right ventricle size and  systolic function.     Compared to the prior study dated 1/14/2022, there are changes as noted.    DEVICE: 2/19/2025:  Encounter Type: Patient seen in clinic for annual device evaluation and iterative programming followed by appointment with Anthony aCrey, EP RAMESH  Device: Medtronic Evera (S) ICD  Pacing %/Programmed:  0.3% / VVI 40 bpm  Lead(s): RV lead stable  Battery longevity: Estimates 2.8 years remaining  Presenting rhythm: VS 67 bpm  Underlying rhythm: NSR 67 bpm  Heart rates: , primarily 50-90's  Atrial high rates: no atrial lead  Anticoagulant: None  Ventricular high rates: 242 VHR logged. Latest on 1/17/25 at 413 pm for 14 beats,  bpm. EGM shows slows down then speeds up again.  previous episodes in November suggest Afib/flutter, addressed by Dr Phan.  Therapies/programming: No therapies delivered  Comments: Normal device function. Programming updated to make all shock vectors to B>AX per Medtronic Recommendations.       I have reviewed and updated the patient's past medical history, allergy list and medication list.          Physical Examination   BMI= There is no height or weight on file to calculate BMI.    Wt Readings from Last 3 Encounters:   02/05/25 118.1 kg (260 lb 6.4 oz)   01/17/25 117.8 kg (259 lb 11.2 oz)   11/12/24 120.7 kg (266 lb)       Vitals: There were no vitals taken for this visit.  General   Appearance:   Alert and oriented, in no acute distress.    HEENT:  Normocephalic and atraumatic. Conjunctiva and sclera are clear. Moist oral mucosa.    Neck: No JVP, carotid bruit or obvious thyromegaly.   Lungs:   Respirations unlabored. Clear bilaterally with no rales, rhonchi, or wheezes.     Cardiovascular:   Rhythm is regular. S1 and S2 are normal. No significant murmur is present. Lower extremities demonstrate no significant edema. Posterior tibial pulses are intact bilaterally.   Extremities: No cyanosis or clubbing   Skin: Skin is warm, dry, and otherwise intact.   Neurologic: Gait not assessed. Mood and affect appropriate.           Medical History  Surgical History Family History Social History   Past Medical History:   Diagnosis Date    Arthritis unknown    right knee    Cholecystitis     Coronary artery disease     had stents placed 08/08/1999    Hypercholesteremia     Hypertension     ICD (implantable cardioverter-defibrillator) in place     Myocardial infarction (H) 1999    Obesity     Scrotal swelling     Sleep apnea     can't tolerate cpap    Past Surgical History:   Procedure Laterality Date    CARDIAC DEFIBRILLATOR PLACEMENT  2015    CORONARY STENT PLACEMENT  08/08/1999    LAD and Circ    FRACTURE SURGERY      LAPAROSCOPIC CHOLECYSTECTOMY N/A 11/9/2018     Procedure: LAPAROSCOPIC CHOLECYSTECTOMY;  Surgeon: Mark Bee MD;  Location: [facility] Main OR;  Service: General    ORIF TIBIA & FIBULA FRACTURES Right 1945    TONSILLECTOMY  1944    Family History   Problem Relation Age of Onset    Other - See Comments Mother         gallbladder surgery    Cerebrovascular Disease Father     Coronary Artery Disease Brother     Social History     Socioeconomic History    Marital status: Single     Spouse name: Not on file    Number of children: Not on file    Years of education: Not on file    Highest education level: Not on file   Occupational History    Not on file   Tobacco Use    Smoking status: Former    Smokeless tobacco: Never   Vaping Use    Vaping status: Never Used   Substance and Sexual Activity    Alcohol use: Yes     Alcohol/week: 1.0 standard drink of alcohol     Comment: Alcoholic Drinks/day: rare - only in [place]    Drug use: No    Sexual activity: Never   Other Topics Concern    Not on file   Social History Narrative    [employer], [employer].  Graduate [employer]  Was  for [employer].  From [place].  He is into genealogy.       Social Drivers of Health     Financial Resource Strain: Low Risk  (11/7/2024)    Received from [employer]    Financial Resource Strain     Difficulty of Paying Living Expenses: 3     Difficulty of Paying Living Expenses: Not on file   Food Insecurity: No Food Insecurity (11/7/2024)    Received from [employer]    Food Insecurity     Do you worry your food will run out before you are able to buy more?: 1   Transportation Needs: No Transportation Needs (11/7/2024)    [email or web address]    Transportation Needs     Does lack of transportation keep you from medical appointments?: 1     Does lack of transportation keep you from work, meetings or getting things that you need?: 1   Physical Activity: Not on  file   Stress: Not on file   Social Connections: Socially Integrated (11/7/2024)    Received from [employer]    Social Connections     Do you often feel lonely or isolated from those around you?: 0   Interpersonal Safety: Low Risk  (11/4/2024)    Interpersonal Safety     Do you feel physically and emotionally safe where you currently live?: Yes     Within the past 12 months, have you been hit, slapped, kicked or otherwise physically hurt by someone?: No     Within the past 12 months, have you been humiliated or emotionally abused in other ways by your partner or ex-partner?: No   Housing Stability: Low Risk  (11/7/2024)    Received from [employer]    Housing Stability     What is your housing situation today?: 1          Medications  Allergies   Scheduled Meds:  Current Outpatient Medications   Medication Sig Dispense Refill    acetaminophen (TYLENOL) 325 MG tablet [ACETAMINOPHEN (TYLENOL) 325 MG TABLET] Take 2 tablets (650 mg total) by mouth every 4 (four) hours as needed.  0    clopidogrel (PLAVIX) 75 MG tablet TAKE 1 TABLET(75 MG) BY MOUTH DAILY 90 tablet 1    diphenhydrAMINE (BENADRYL) 25 MG tablet Take 1-2 tablets (25-50 mg) by mouth nightly as needed (angioedema)      famotidine (PEPCID) 20 MG tablet Take 1 tablet (20 mg) by mouth At Bedtime 180 tablet 3    fexofenadine (ALLEGRA) 180 MG tablet Take 1 tablet (180 mg) by mouth daily 90 tablet 3    hydrochlorothiazide (HYDRODIURIL) 25 MG tablet TAKE 1 TABLET BY MOUTH DAILY 90 tablet 1    metoprolol succinate ER (TOPROL XL) 100 MG 24 hr tablet Take 1.5 tablets (150 mg) by mouth daily. 90 tablet 1    nitroglycerin (NITROSTAT) 0.4 MG SL tablet [NITROGLYCERIN (NITROSTAT) 0.4 MG SL TABLET] Place 1 tablet (0.4 mg total) under the tongue as needed. Use as directed. 30 tablet 0    simvastatin (ZOCOR) 40 MG tablet TAKE 1 TABLET(40 MG) BY MOUTH AT BEDTIME 90 tablet 1    vitamin D3 (CHOLECALCIFEROL) 50 mcg  (2000 units) tablet Take 1 tablet (50 mcg) by mouth daily      Allergies   Allergen Reactions    Ace Inhibitors Angioedema    Arb-Angiotensin Receptor Antagonist [Angiotensin Receptor Blockers] Angioedema    Aspirin Unknown     Angioedema - stopped aspirin, angioedema resolved, restarted recurred (fairly strong evidence)    Gabapentin Angioedema    Jardiance [Empagliflozin] Angioedema     Angioedema (took at the same time as spironoloactone)    Losartan Angioedema    Ramipril Angioedema    Seafood Nausea and Vomiting    Shellfish Containing Products [Shellfish-Derived Products] Nausea and Vomiting     SHRIMP- worst reaction to.    Spironolactone Angioedema     Took one dose with Jardiance         Lab Results    Chemistry/lipid CBC Cardiac Enzymes/BNP/TSH/INR   Lab Results   Component Value Date    CHOL 110 11/04/2024    HDL 39 (L) 11/04/2024    TRIG 74 11/04/2024    BUN 19.3 11/04/2024     11/04/2024    CO2 24 11/04/2024    Lab Results   Component Value Date    WBC 9.7 11/04/2024    HGB 14.5 11/04/2024    HCT 44.2 11/04/2024    MCV 93 11/04/2024     11/04/2024    Last Comprehensive Metabolic Panel:  Lab Results   Component Value Date     11/04/2024    POTASSIUM 4.2 11/04/2024    CHLORIDE 100 11/04/2024    CO2 24 11/04/2024    ANIONGAP 14 11/04/2024    GLC 96 11/04/2024    BUN 19.3 11/04/2024    CR 0.97 11/04/2024    GFRESTIMATED 76 11/04/2024    DAMIÁN 9.3 11/04/2024               The longitudinal plan of care for the diagnosis(es)/condition(s) as documented were addressed during this visit. Due to the added complexity in care, I will continue to support Chaim in the subsequent management and with ongoing continuity of care.      This note has been dictated using voice recognition software. Any grammatical, typographical, or context distortions are unintentional and inherent to the software.    Anthony Carey PA-C  Cardiac Electrophysiology  [facility]  Clinic and scheduling:  [phone]  Fax: [phone]  Electrophysiology Nurses: [phone]

## 2025-02-19 ENCOUNTER — ANCILLARY PROCEDURE (OUTPATIENT)
Dept: CARDIOLOGY | Facility: CLINIC | Age: 85
End: 2025-02-19
Attending: INTERNAL MEDICINE
Payer: MEDICARE

## 2025-02-19 VITALS
DIASTOLIC BLOOD PRESSURE: 66 MMHG | RESPIRATION RATE: 16 BRPM | SYSTOLIC BLOOD PRESSURE: 110 MMHG | BODY MASS INDEX: 36.88 KG/M2 | WEIGHT: 257 LBS | HEART RATE: 67 BPM

## 2025-02-19 DIAGNOSIS — Z95.810 ICD (IMPLANTABLE CARDIOVERTER-DEFIBRILLATOR) IN PLACE: ICD-10-CM

## 2025-02-19 DIAGNOSIS — I49.01 VENTRICULAR FIBRILLATION (H): Primary | ICD-10-CM

## 2025-02-19 DIAGNOSIS — I25.5 ISCHEMIC CARDIOMYOPATHY: ICD-10-CM

## 2025-02-19 LAB
MDC_IDC_EPISODE_DTM: NORMAL
MDC_IDC_EPISODE_DURATION: 2 S
MDC_IDC_EPISODE_ID: 356
MDC_IDC_EPISODE_TYPE: NORMAL
MDC_IDC_EPISODE_TYPE_INDUCED: NO
MDC_IDC_LEAD_CONNECTION_STATUS: NORMAL
MDC_IDC_LEAD_IMPLANT_DT: NORMAL
MDC_IDC_LEAD_LOCATION: NORMAL
MDC_IDC_LEAD_LOCATION_DETAIL_1: NORMAL
MDC_IDC_LEAD_MFG: NORMAL
MDC_IDC_LEAD_MODEL: NORMAL
MDC_IDC_LEAD_POLARITY_TYPE: NORMAL
MDC_IDC_LEAD_SERIAL: NORMAL
MDC_IDC_LEAD_SPECIAL_FUNCTION: NORMAL
MDC_IDC_MSMT_BATTERY_DTM: NORMAL
MDC_IDC_MSMT_BATTERY_REMAINING_LONGEVITY: 34 MO
MDC_IDC_MSMT_BATTERY_RRT_TRIGGER: 2.73
MDC_IDC_MSMT_BATTERY_STATUS: NORMAL
MDC_IDC_MSMT_BATTERY_VOLTAGE: 2.95 V
MDC_IDC_MSMT_LEADCHNL_RV_IMPEDANCE_VALUE: 304 OHM
MDC_IDC_MSMT_LEADCHNL_RV_IMPEDANCE_VALUE: 342 OHM
MDC_IDC_MSMT_LEADCHNL_RV_PACING_THRESHOLD_AMPLITUDE: 0.75 V
MDC_IDC_MSMT_LEADCHNL_RV_PACING_THRESHOLD_AMPLITUDE: 0.75 V
MDC_IDC_MSMT_LEADCHNL_RV_PACING_THRESHOLD_PULSEWIDTH: 0.4 MS
MDC_IDC_MSMT_LEADCHNL_RV_PACING_THRESHOLD_PULSEWIDTH: 0.4 MS
MDC_IDC_MSMT_LEADCHNL_RV_SENSING_INTR_AMPL: 4.75 MV
MDC_IDC_MSMT_LEADCHNL_RV_SENSING_INTR_AMPL: 6.88 MV
MDC_IDC_PG_IMPLANT_DTM: NORMAL
MDC_IDC_PG_MFG: NORMAL
MDC_IDC_PG_MODEL: NORMAL
MDC_IDC_PG_SERIAL: NORMAL
MDC_IDC_PG_TYPE: NORMAL
MDC_IDC_SESS_CLINIC_NAME: NORMAL
MDC_IDC_SESS_DTM: NORMAL
MDC_IDC_SESS_TYPE: NORMAL
MDC_IDC_SET_BRADY_HYSTRATE: NORMAL
MDC_IDC_SET_BRADY_LOWRATE: 40 {BEATS}/MIN
MDC_IDC_SET_BRADY_MODE: NORMAL
MDC_IDC_SET_LEADCHNL_RV_PACING_AMPLITUDE: NORMAL
MDC_IDC_SET_LEADCHNL_RV_PACING_ANODE_ELECTRODE_1: NORMAL
MDC_IDC_SET_LEADCHNL_RV_PACING_ANODE_LOCATION_1: NORMAL
MDC_IDC_SET_LEADCHNL_RV_PACING_CAPTURE_MODE: NORMAL
MDC_IDC_SET_LEADCHNL_RV_PACING_CATHODE_ELECTRODE_1: NORMAL
MDC_IDC_SET_LEADCHNL_RV_PACING_CATHODE_LOCATION_1: NORMAL
MDC_IDC_SET_LEADCHNL_RV_PACING_POLARITY: NORMAL
MDC_IDC_SET_LEADCHNL_RV_PACING_PULSEWIDTH: 0.4 MS
MDC_IDC_SET_LEADCHNL_RV_SENSING_ANODE_ELECTRODE_1: NORMAL
MDC_IDC_SET_LEADCHNL_RV_SENSING_ANODE_LOCATION_1: NORMAL
MDC_IDC_SET_LEADCHNL_RV_SENSING_CATHODE_ELECTRODE_1: NORMAL
MDC_IDC_SET_LEADCHNL_RV_SENSING_CATHODE_LOCATION_1: NORMAL
MDC_IDC_SET_LEADCHNL_RV_SENSING_POLARITY: NORMAL
MDC_IDC_SET_LEADCHNL_RV_SENSING_SENSITIVITY: 0.3 MV
MDC_IDC_SET_ZONE_DETECTION_BEATS_DENOMINATOR: 16 {BEATS}
MDC_IDC_SET_ZONE_DETECTION_BEATS_DENOMINATOR: 28 {BEATS}
MDC_IDC_SET_ZONE_DETECTION_BEATS_DENOMINATOR: 32 {BEATS}
MDC_IDC_SET_ZONE_DETECTION_BEATS_NUMERATOR: 16 {BEATS}
MDC_IDC_SET_ZONE_DETECTION_BEATS_NUMERATOR: 24 {BEATS}
MDC_IDC_SET_ZONE_DETECTION_BEATS_NUMERATOR: 28 {BEATS}
MDC_IDC_SET_ZONE_DETECTION_INTERVAL: 240 MS
MDC_IDC_SET_ZONE_DETECTION_INTERVAL: 320 MS
MDC_IDC_SET_ZONE_DETECTION_INTERVAL: 360 MS
MDC_IDC_SET_ZONE_DETECTION_INTERVAL: 400 MS
MDC_IDC_SET_ZONE_STATUS: NORMAL
MDC_IDC_SET_ZONE_TYPE: NORMAL
MDC_IDC_SET_ZONE_VENDOR_TYPE: NORMAL
MDC_IDC_STAT_BRADY_DTM_END: NORMAL
MDC_IDC_STAT_BRADY_DTM_START: NORMAL
MDC_IDC_STAT_BRADY_RV_PERCENT_PACED: 0.35 %
MDC_IDC_STAT_EPISODE_RECENT_COUNT: 0
MDC_IDC_STAT_EPISODE_RECENT_COUNT: 242
MDC_IDC_STAT_EPISODE_RECENT_COUNT_DTM_END: NORMAL
MDC_IDC_STAT_EPISODE_RECENT_COUNT_DTM_START: NORMAL
MDC_IDC_STAT_EPISODE_TOTAL_COUNT: 0
MDC_IDC_STAT_EPISODE_TOTAL_COUNT: 1
MDC_IDC_STAT_EPISODE_TOTAL_COUNT: 3
MDC_IDC_STAT_EPISODE_TOTAL_COUNT: 343
MDC_IDC_STAT_EPISODE_TOTAL_COUNT_DTM_END: NORMAL
MDC_IDC_STAT_EPISODE_TOTAL_COUNT_DTM_START: NORMAL
MDC_IDC_STAT_EPISODE_TYPE: NORMAL
MDC_IDC_STAT_TACHYTHERAPY_ATP_DELIVERED_RECENT: 0
MDC_IDC_STAT_TACHYTHERAPY_ATP_DELIVERED_TOTAL: 0
MDC_IDC_STAT_TACHYTHERAPY_RECENT_DTM_END: NORMAL
MDC_IDC_STAT_TACHYTHERAPY_RECENT_DTM_START: NORMAL
MDC_IDC_STAT_TACHYTHERAPY_SHOCKS_ABORTED_RECENT: 0
MDC_IDC_STAT_TACHYTHERAPY_SHOCKS_ABORTED_TOTAL: 0
MDC_IDC_STAT_TACHYTHERAPY_SHOCKS_DELIVERED_RECENT: 0
MDC_IDC_STAT_TACHYTHERAPY_SHOCKS_DELIVERED_TOTAL: 1
MDC_IDC_STAT_TACHYTHERAPY_TOTAL_DTM_END: NORMAL
MDC_IDC_STAT_TACHYTHERAPY_TOTAL_DTM_START: NORMAL

## 2025-02-19 PROCEDURE — G2211 COMPLEX E/M VISIT ADD ON: HCPCS

## 2025-02-19 PROCEDURE — 99214 OFFICE O/P EST MOD 30 MIN: CPT

## 2025-02-19 PROCEDURE — 93282 PRGRMG EVAL IMPLANTABLE DFB: CPT | Performed by: INTERNAL MEDICINE

## 2025-02-19 NOTE — LETTER
"2/19/2025    Luis Elena MD  [address] [address]    RE: Chaim Dee       Dear Colleague,     I had the pleasure of seeing Chaim Dee in the [facility].     [facility]  Cardiac Electrophysiology  [address]  [place] [address]   Office: [phone]  Fax: [phone]     HEART CARE ELECTROPHYSIOLOGY FOLLOW UP    Primary Care: Luis Elena MD      Assessment/Recommendations     Paroxysmal atrial fibrillation:   Seen on routine remote ICD transmission 11/14/2025.  Was reviewed by Dr. Phan, had multiple detected episodes of \"NSVT\" between November 12-17.  He reviewed and all but 1 demonstrate atrial fibrillation with RVR.  Total burden number of days was less than 45 minutes of A-fib.  He also had a single 9 beat run of nonsustained VT on November 17.  Metoprolol succinate was increased to 150 mg daily.  He was recommended for echocardiogram to assess LV function. MRI ordered by Dr. Contreras to further assess NSVT.     [id number] score of at least 5 for age >75, CAD, cardiomyopathy, hypertension    NSVT status post ICD implanted 2/12/2018: device functioning appropriately    CAD: no anginal symptoms    Cardiomyopathy: appears euvolemic today    Hypertension: controlled    Plan:  Continue metoprolol 150 mg daily.  We discussed starting DOAC given his A-fib burden. Per AHA guidelines, would be reasonable to start given CHADS of 5 and burden lasting 45 minutes. He would like to discuss with his PCP, I will also send a message to his PCP. Provided free 30 day coupon for eliquis.   May transfer his care to [facility], for now can continue routine remote monitoring. Can follow up if more AF occurs.        History of Present Illness/Subjective    Chaim Dee is a 84 year old male with past medical history significant for coronary artery disease (remote anterior MI 1999 with stenting of LAD and circumflex), NSVT " status post ICD implant 2/12/2018, mild HFrEF, hypertension, seen today after brief episodes of paroxysmal atrial fibrillation on routine ICD remote on routine ICD remote transmission.     Overall feels well.  He was not able to elaborate on symptoms of his atrial fibrillation, if he had any.  It appears he was asymptomatic.  He was recently getting over pneumonia, which she feels better from now.  We had discussed initiation of a DOAC, but he would like to talk with his primary first, which is reasonable.  He denies chest discomfort, palpitations, shortness of breath, lightheadedness/dizziness, pedal edema, or syncope.       Data Review     Arrhythmia hx  Sx: Asymptomatic  Dx/date: On device check 1/14/2025  Rate control: Metoprolol 150 mg  AAD: None  DCCV: None  Ablation: None  [id number] 5  OAC: None yet, provided with free 30-day card    EKG personally reviewed:  5/29/2019: Sinus rhythm 76 bpm, RBBB, LAFB  11/7/2018: Sinus rhythm 91 bpm, incomplete RBBB      TTE 2/11/25:  Technically difficult study.Extremely difficult acoustic windows despite the  use of contrast for endcardial border definition.     1. The left ventricle is not well visualized. Left ventricular function is  decreased. The ejection fraction is 45-50% (mildly reduced). There is severe  hypokinesis to akinesis of the mid to apical septal and mid to apical anterior  wall segments.  2. The right ventricle is not well visualized. Normal right ventricle size and  systolic function.     Compared to the prior study dated 1/14/2022, there are changes as noted.    DEVICE: 2/19/2025:  Encounter Type: Patient seen in clinic for annual device evaluation and iterative programming followed by appointment with SEGUNDO Cohen RAMESH  Device: Medtronic Evera (S) ICD  Pacing %/Programmed:  0.3% / VVI 40 bpm  Lead(s): RV lead stable  Battery longevity: Estimates 2.8 years remaining  Presenting rhythm: VS 67 bpm  Underlying rhythm: NSR 67 bpm  Heart rates:  , primarily 50-90's  Atrial high rates: no atrial lead  Anticoagulant: None  Ventricular high rates: 242 VHR logged. Latest on 1/17/25 at 413 pm for 14 beats,  bpm. EGM shows slows down then speeds up again. previous episodes in November suggest Afib/flutter, addressed by Dr Phan.  Therapies/programming: No therapies delivered  Comments: Normal device function. Programming updated to make all shock vectors to B>AX per Medtronic Recommendations.       I have reviewed and updated the patient's past medical history, allergy list and medication list.          Physical Examination   BMI= There is no height or weight on file to calculate BMI.    Wt Readings from Last 3 Encounters:   02/05/25 118.1 kg (260 lb 6.4 oz)   01/17/25 117.8 kg (259 lb 11.2 oz)   11/12/24 120.7 kg (266 lb)       Vitals: There were no vitals taken for this visit.  General   Appearance:   Alert and oriented, in no acute distress.    HEENT:  Normocephalic and atraumatic. Conjunctiva and sclera are clear. Moist oral mucosa.    Neck: No JVP, carotid bruit or obvious thyromegaly.   Lungs:   Respirations unlabored. Clear bilaterally with no rales, rhonchi, or wheezes.     Cardiovascular:   Rhythm is regular. S1 and S2 are normal. No significant murmur is present. Lower extremities demonstrate no significant edema. Posterior tibial pulses are intact bilaterally.   Extremities: No cyanosis or clubbing   Skin: Skin is warm, dry, and otherwise intact.   Neurologic: Gait not assessed. Mood and affect appropriate.           Medical History  Surgical History Family History Social History   Past Medical History:   Diagnosis Date     Arthritis unknown    right knee     Cholecystitis      Coronary artery disease     had stents placed 08/08/1999     Hypercholesteremia      Hypertension      ICD (implantable cardioverter-defibrillator) in place      Myocardial infarction (H) 1999     Obesity      Scrotal swelling      Sleep apnea     can't tolerate cpap     Past Surgical History:   Procedure Laterality Date     CARDIAC DEFIBRILLATOR PLACEMENT  2015     CORONARY STENT PLACEMENT  08/08/1999    LAD and Circ     FRACTURE SURGERY       LAPAROSCOPIC CHOLECYSTECTOMY N/A 11/9/2018    Procedure: LAPAROSCOPIC CHOLECYSTECTOMY;  Surgeon: Mark Bee MD;  Location: [facility] Main OR;  Service: General     ORIF TIBIA & FIBULA FRACTURES Right 1945     TONSILLECTOMY  1944    Family History   Problem Relation Age of Onset     Other - See Comments Mother         gallbladder surgery     Cerebrovascular Disease Father      Coronary Artery Disease Brother     Social History     Socioeconomic History     Marital status: Single     Spouse name: Not on file     Number of children: Not on file     Years of education: Not on file     Highest education level: Not on file   Occupational History     Not on file   Tobacco Use     Smoking status: Former     Smokeless tobacco: Never   Vaping Use     Vaping status: Never Used   Substance and Sexual Activity     Alcohol use: Yes     Alcohol/week: 1.0 standard drink of alcohol     Comment: Alcoholic Drinks/day: rare - only in [place]     Drug use: No     Sexual activity: Never   Other Topics Concern     Not on file   Social History Narrative    [employer], [employer].  Graduate [employer]  Was  for [employer].  From [place] [place].  He is into [employer].       Social Drivers of Health     Financial Resource Strain: Low Risk  (11/7/2024)    Received from [employer]    Financial Resource Strain      Difficulty of Paying Living Expenses: 3      Difficulty of Paying Living Expenses: Not on file   Food Insecurity: No Food Insecurity (11/7/2024)    Received from [employer]    Food Insecurity      Do you worry your food will run out before you are able to buy more?: 1   Transportation Needs: No Transportation Needs (11/7/2024)    Received from [employer]  [facility]    Transportation Needs      Does lack of transportation keep you from medical appointments?: 1      Does lack of transportation keep you from work, meetings or getting things that you need?: 1   Physical Activity: Not on file   Stress: Not on file   Social Connections: Socially Integrated (11/7/2024)    Received from [facility]    Social Connections      Do you often feel lonely or isolated from those around you?: 0   Interpersonal Safety: Low Risk  (11/4/2024)    Interpersonal Safety      Do you feel physically and emotionally safe where you currently live?: Yes      Within the past 12 months, have you been hit, slapped, kicked or otherwise physically hurt by someone?: No      Within the past 12 months, have you been humiliated or emotionally abused in other ways by your partner or ex-partner?: No   Housing Stability: Low Risk  (11/7/2024)    Received from [facility]    Housing Stability      What is your housing situation today?: 1          Medications  Allergies   Scheduled Meds:  Current Outpatient Medications   Medication Sig Dispense Refill     acetaminophen (TYLENOL) 325 MG tablet [ACETAMINOPHEN (TYLENOL) 325 MG TABLET] Take 2 tablets (650 mg total) by mouth every 4 (four) hours as needed.  0     clopidogrel (PLAVIX) 75 MG tablet TAKE 1 TABLET(75 MG) BY MOUTH DAILY 90 tablet 1     diphenhydrAMINE (BENADRYL) 25 MG tablet Take 1-2 tablets (25-50 mg) by mouth nightly as needed (angioedema)       famotidine (PEPCID) 20 MG tablet Take 1 tablet (20 mg) by mouth At Bedtime 180 tablet 3     fexofenadine (ALLEGRA) 180 MG tablet Take 1 tablet (180 mg) by mouth daily 90 tablet 3     hydrochlorothiazide (HYDRODIURIL) 25 MG tablet TAKE 1 TABLET BY MOUTH DAILY 90 tablet 1     metoprolol succinate ER (TOPROL XL) 100 MG 24 hr tablet Take 1.5 tablets (150 mg) by mouth daily. 90 tablet 1     nitroglycerin (NITROSTAT) 0.4 MG SL  tablet [NITROGLYCERIN (NITROSTAT) 0.4 MG SL TABLET] Place 1 tablet (0.4 mg total) under the tongue as needed. Use as directed. 30 tablet 0     simvastatin (ZOCOR) 40 MG tablet TAKE 1 TABLET(40 MG) BY MOUTH AT BEDTIME 90 tablet 1     vitamin D3 (CHOLECALCIFEROL) 50 mcg (2000 units) tablet Take 1 tablet (50 mcg) by mouth daily      Allergies   Allergen Reactions     Ace Inhibitors Angioedema     Arb-Angiotensin Receptor Antagonist [Angiotensin Receptor Blockers] Angioedema     Aspirin Unknown     Angioedema - stopped aspirin, angioedema resolved, restarted recurred (fairly strong evidence)     Gabapentin Angioedema     Jardiance [Empagliflozin] Angioedema     Angioedema (took at the same time as spironoloactone)     Losartan Angioedema     Ramipril Angioedema     Seafood Nausea and Vomiting     Shellfish Containing Products [Shellfish-Derived Products] Nausea and Vomiting     SHRIMP- worst reaction to.     Spironolactone Angioedema     Took one dose with Jardiance         Lab Results    Chemistry/lipid CBC Cardiac Enzymes/BNP/TSH/INR   Lab Results   Component Value Date    CHOL 110 11/04/2024    HDL 39 (L) 11/04/2024    TRIG 74 11/04/2024    BUN 19.3 11/04/2024     11/04/2024    CO2 24 11/04/2024    Lab Results   Component Value Date    WBC 9.7 11/04/2024    HGB 14.5 11/04/2024    HCT 44.2 11/04/2024    MCV 93 11/04/2024     11/04/2024    Last Comprehensive Metabolic Panel:  Lab Results   Component Value Date     11/04/2024    POTASSIUM 4.2 11/04/2024    CHLORIDE 100 11/04/2024    CO2 24 11/04/2024    ANIONGAP 14 11/04/2024    GLC 96 11/04/2024    BUN 19.3 11/04/2024    CR 0.97 11/04/2024    GFRESTIMATED 76 11/04/2024    DAMIÁN 9.3 11/04/2024               The longitudinal plan of care for the diagnosis(es)/condition(s) as documented were addressed during this visit. Due to the added complexity in care, I will continue to support Chaim in the subsequent management and with ongoing continuity of care.       This note has been dictated using voice recognition software. Any grammatical, typographical, or context distortions are unintentional and inherent to the software.    Anthony Carey PA-C  Cardiac Electrophysiology  [facility]  Clinic and [phone]  Fax: [phone]  Electrophysiology Nurses: [phone]         Thank you for allowing me to participate in the care of your patient.      Sincerely,     Dank Carey PA-C     [facility]  cc:   Rohan Phan MD  [address]  [place] [address]

## 2025-02-19 NOTE — PATIENT INSTRUCTIONS
Chaim Dee,    It was a pleasure to see you today at the [facility].     My recommendations after this visit include:  - continue metoprolol 150 mg daily   - continue to consider starting a blood thinner for the atrial fibrillation episodes. I will also send a message to your PCP as well   - continue routine remote monitoring at home for ICD  - follow up as needed. Can transfer care to [facility] too if desired  - [facility] will call about possible MRI    Please do not hesitate to call with additional questions or concerns.     Anthony Carey PA-C  Clinical Cardiac Electrophysiology  [facility]  Clinic and [phone]  Fax: [phone]  Electrophysiology Nurses: [phone]

## 2025-02-19 NOTE — Clinical Note
Hi Dr. Elena,   I saw Chaim today for new atrial fibrillation that was found on his device check.  We talked about what atrial fibrillation is, and the increased risk of stroke.  He had around 45 minutes of total burden of atrial fibrillation in a 5-day stretch.  Given that he has an elevated [id number] score, we discussed that guidelines say to start a DOAC.  We discussed the risk of stroke versus risk of bleeding on a DOAC as well.  Not sure what his coverage would be, I provided him with a free 30-day card for Eliquis.  He understandably would like to talk with you first about this.  Let me know if you need anything else from our end.  Anthony Carey PA-C

## 2025-04-10 ENCOUNTER — HOSPITAL ENCOUNTER (OUTPATIENT)
Facility: AMBULATORY SURGERY CENTER | Age: 85
End: 2025-04-10
Attending: OPHTHALMOLOGY | Admitting: OPHTHALMOLOGY
Payer: MEDICARE

## 2025-05-07 ENCOUNTER — OFFICE VISIT (OUTPATIENT)
Dept: INTERNAL MEDICINE | Facility: CLINIC | Age: 85
End: 2025-05-07
Payer: MEDICARE

## 2025-05-07 VITALS
TEMPERATURE: 97.5 F | OXYGEN SATURATION: 98 % | RESPIRATION RATE: 13 BRPM | WEIGHT: 268 LBS | HEART RATE: 68 BPM | HEIGHT: 70 IN | SYSTOLIC BLOOD PRESSURE: 126 MMHG | BODY MASS INDEX: 38.37 KG/M2 | DIASTOLIC BLOOD PRESSURE: 64 MMHG

## 2025-05-07 DIAGNOSIS — T78.3XXS ANGIOEDEMA, SEQUELA: ICD-10-CM

## 2025-05-07 DIAGNOSIS — I10 PRIMARY HYPERTENSION: ICD-10-CM

## 2025-05-07 DIAGNOSIS — I48.0 PAROXYSMAL ATRIAL FIBRILLATION (H): ICD-10-CM

## 2025-05-07 DIAGNOSIS — I25.10 CHRONIC CORONARY ARTERY DISEASE: ICD-10-CM

## 2025-05-07 DIAGNOSIS — Z66 DNR (DO NOT RESUSCITATE): ICD-10-CM

## 2025-05-07 DIAGNOSIS — D69.6 THROMBOCYTOPENIA: ICD-10-CM

## 2025-05-07 DIAGNOSIS — K21.9 GASTROESOPHAGEAL REFLUX DISEASE WITHOUT ESOPHAGITIS: ICD-10-CM

## 2025-05-07 DIAGNOSIS — I50.22 CHRONIC HFREF (HEART FAILURE WITH REDUCED EJECTION FRACTION) (H): ICD-10-CM

## 2025-05-07 DIAGNOSIS — G47.33 OSA (OBSTRUCTIVE SLEEP APNEA): ICD-10-CM

## 2025-05-07 DIAGNOSIS — Z95.810 ICD (IMPLANTABLE CARDIOVERTER-DEFIBRILLATOR), SINGLE, IN SITU: ICD-10-CM

## 2025-05-07 DIAGNOSIS — E66.812 CLASS 2 SEVERE OBESITY DUE TO EXCESS CALORIES WITH SERIOUS COMORBIDITY AND BODY MASS INDEX (BMI) OF 38.0 TO 38.9 IN ADULT (H): ICD-10-CM

## 2025-05-07 DIAGNOSIS — E66.01 CLASS 2 SEVERE OBESITY DUE TO EXCESS CALORIES WITH SERIOUS COMORBIDITY AND BODY MASS INDEX (BMI) OF 38.0 TO 38.9 IN ADULT (H): ICD-10-CM

## 2025-05-07 DIAGNOSIS — M15.0 PRIMARY OSTEOARTHRITIS INVOLVING MULTIPLE JOINTS: ICD-10-CM

## 2025-05-07 DIAGNOSIS — E78.5 HYPERLIPIDEMIA WITH TARGET LOW DENSITY LIPOPROTEIN (LDL) CHOLESTEROL LESS THAN 70 MG/DL: ICD-10-CM

## 2025-05-07 DIAGNOSIS — Z01.818 PREOPERATIVE EXAMINATION: Primary | ICD-10-CM

## 2025-05-07 DIAGNOSIS — I47.20 PAROXYSMAL VENTRICULAR TACHYCARDIA (H): ICD-10-CM

## 2025-05-07 DIAGNOSIS — H25.9 AGE-RELATED CATARACT OF BOTH EYES, UNSPECIFIED AGE-RELATED CATARACT TYPE: ICD-10-CM

## 2025-05-07 LAB
ANION GAP SERPL CALCULATED.3IONS-SCNC: 12 MMOL/L (ref 7–15)
BUN SERPL-MCNC: 20 MG/DL (ref 8–23)
CALCIUM SERPL-MCNC: 9.1 MG/DL (ref 8.8–10.4)
CHLORIDE SERPL-SCNC: 106 MMOL/L (ref 98–107)
CREAT SERPL-MCNC: 0.94 MG/DL (ref 0.67–1.17)
EGFRCR SERPLBLD CKD-EPI 2021: 78 ML/MIN/1.73M2
ERYTHROCYTE [DISTWIDTH] IN BLOOD BY AUTOMATED COUNT: 15.3 % (ref 10–15)
GLUCOSE SERPL-MCNC: 112 MG/DL (ref 70–99)
HCO3 SERPL-SCNC: 23 MMOL/L (ref 22–29)
HCT VFR BLD AUTO: 42.7 % (ref 40–53)
HGB BLD-MCNC: 14.3 G/DL (ref 13.3–17.7)
MCH RBC QN AUTO: 30.9 PG (ref 26.5–33)
MCHC RBC AUTO-ENTMCNC: 33.5 G/DL (ref 31.5–36.5)
MCV RBC AUTO: 92 FL (ref 78–100)
PLATELET # BLD AUTO: 166 10E3/UL (ref 150–450)
POTASSIUM SERPL-SCNC: 3.8 MMOL/L (ref 3.4–5.3)
RBC # BLD AUTO: 4.63 10E6/UL (ref 4.4–5.9)
SODIUM SERPL-SCNC: 141 MMOL/L (ref 135–145)
WBC # BLD AUTO: 7.4 10E3/UL (ref 4–11)

## 2025-05-07 PROCEDURE — 36415 COLL VENOUS BLD VENIPUNCTURE: CPT | Performed by: INTERNAL MEDICINE

## 2025-05-07 PROCEDURE — G2211 COMPLEX E/M VISIT ADD ON: HCPCS | Performed by: INTERNAL MEDICINE

## 2025-05-07 PROCEDURE — 3074F SYST BP LT 130 MM HG: CPT | Performed by: INTERNAL MEDICINE

## 2025-05-07 PROCEDURE — 85027 COMPLETE CBC AUTOMATED: CPT | Performed by: INTERNAL MEDICINE

## 2025-05-07 PROCEDURE — 99214 OFFICE O/P EST MOD 30 MIN: CPT | Performed by: INTERNAL MEDICINE

## 2025-05-07 PROCEDURE — 3078F DIAST BP <80 MM HG: CPT | Performed by: INTERNAL MEDICINE

## 2025-05-07 PROCEDURE — 80048 BASIC METABOLIC PNL TOTAL CA: CPT | Performed by: INTERNAL MEDICINE

## 2025-05-07 NOTE — PROGRESS NOTES
Preoperative Consultation   Chaim Dee   85 year old  male    Date of visit: 5/7/2025  Physician: Luis Elena MD    This is a preoperative consultation requested by Dr. Montaño in preparation for cataract surgery on 5/21/25 (right) and 6/4/25 (left[facility]       Assessment and Plan   Chaim Dee was seen in preoperative consultation in preparation for bilateral cataract surgery.  This is a low risk surgery and the patient has increased risk for major cardiac complications based on a history of myocardial infarction and congestive heart failure. Please note he has an ICD, Medtronic Evera, based on his history of paroxysmal ventricular tachycardia.  He is well compensated congestive heart failure.  Historically, he has had frequent episodes of angioedema, likely triggered by ACE inhibitors, ARBs and aspirin/NSAIDs.  He does have obstructive sleep apnea.  He has no cardiopulmonary contraindication to the proposed procedure and may proceed without further cardiopulmonary testing.    1. Preoperative examination    2. Age-related cataract of both eyes, unspecified age-related cataract type    3. Chronic HFrEF (heart failure with reduced ejection fraction) (H)    4. Chronic coronary artery disease    5. Paroxysmal ventricular tachycardia (H)    6. ICD (implantable cardioverter-defibrillator), single, in situ    7. Paroxysmal atrial fibrillation (H)    8. Primary hypertension    9. Hyperlipidemia with target low density lipoprotein (LDL) cholesterol less than 70 mg/dL    10. FLO - did not tolerate CPAP    11. Angioedema, sequela    12. Thrombocytopenia    13. Gastroesophageal reflux disease without esophagitis    14. Primary osteoarthritis involving multiple joints    15. DNR (do not resuscitate)    16. Class 2 severe obesity due to excess calories with serious comorbidity and body mass index (BMI) of 38.0 to 38.9 in adult (H)          Patient Profile   Social History     Social History Narrative    Mora. [employer], [employer].  Graduate [employer]  Was  for [employer].  From [place] [facility].  He is into genealogy.          Past Medical History   Patient Active Problem List   Diagnosis    Chronic coronary artery disease    Primary hypertension    Class 2 severe obesity due to excess calories with serious comorbidity and body mass index (BMI) of 38.0 to 38.9 in adult (H)    Gastroesophageal reflux disease without esophagitis    FLO - did not tolerate CPAP    Angioedema    ICD (implantable cardioverter-defibrillator), single, in situ    DNR (do not resuscitate)    Paroxysmal ventricular tachycardia (H)    Primary osteoarthritis involving multiple joints    Chronic HFrEF (heart failure with reduced ejection fraction) (H)    Hyperlipidemia with target low density lipoprotein (LDL) cholesterol less than 70 mg/dL    Thrombocytopenia    Paroxysmal atrial fibrillation (H)       Past Surgical History  He has a past surgical history that includes Cardiac Defibrillator Placement (2015); Coronary Stent Placement (08/08/1999); Tonsillectomy (1944); Orif Tibia & Fibula Fractures (Right, 1945); fracture surgery; and Laparoscopic cholecystectomy (N/A, 11/9/2018).     History of Present Illness   This 85 year old man comes in for preoperative evaluation in preparation for cataract surgery.  He is feeling well    Recent antiplatelet use: yes clopidogrel  Personal or family history of bleeding or clotting disorders: no  Steroid use in the past year: yes occasional use with angioedema  Personal or family history of difficulty with anesthesia: no  Current cardiopulmonary symptoms: no  FLO: yes, not CPAP    Review of Systems: A comprehensive review of systems was negative except as noted.     Medications and Allergies   Current Outpatient Medications   Medication Sig Dispense Refill    acetaminophen (TYLENOL) 325 MG tablet [ACETAMINOPHEN (TYLENOL)  325 MG TABLET] Take 2 tablets (650 mg total) by mouth every 4 (four) hours as needed.  0    clopidogrel (PLAVIX) 75 MG tablet TAKE 1 TABLET(75 MG) BY MOUTH DAILY 90 tablet 1    diphenhydrAMINE (BENADRYL) 25 MG tablet Take 1-2 tablets (25-50 mg) by mouth nightly as needed (angioedema)      famotidine (PEPCID) 20 MG tablet Take 1 tablet (20 mg) by mouth At Bedtime 180 tablet 3    fexofenadine (ALLEGRA) 180 MG tablet Take 1 tablet (180 mg) by mouth daily 90 tablet 3    hydrochlorothiazide (HYDRODIURIL) 25 MG tablet TAKE 1 TABLET BY MOUTH DAILY 90 tablet 1    metoprolol succinate ER (TOPROL XL) 100 MG 24 hr tablet TAKE 1 AND 1/2 TABLETS(150 MG) BY MOUTH DAILY 135 tablet 2    nitroglycerin (NITROSTAT) 0.4 MG SL tablet [NITROGLYCERIN (NITROSTAT) 0.4 MG SL TABLET] Place 1 tablet (0.4 mg total) under the tongue as needed. Use as directed. 30 tablet 0    simvastatin (ZOCOR) 40 MG tablet TAKE 1 TABLET(40 MG) BY MOUTH AT BEDTIME 90 tablet 1    vitamin D3 (CHOLECALCIFEROL) 50 mcg (2000 units) tablet Take 1 tablet (50 mcg) by mouth daily       Allergies   Allergen Reactions    Ace Inhibitors Angioedema    Arb-Angiotensin Receptor Antagonist [Angiotensin Receptor Blockers] Angioedema    Aspirin Unknown     Angioedema - stopped aspirin, angioedema resolved, restarted recurred (fairly strong evidence)    Gabapentin Angioedema    Jardiance [Empagliflozin] Angioedema     Angioedema (took at the same time as spironoloactone)    Losartan Angioedema    Ramipril Angioedema    Seafood Nausea and Vomiting    Shellfish Containing Products [Shellfish-Derived Products] Nausea and Vomiting     SHRIMP- worst reaction to.    Spironolactone Angioedema     Took one dose with Jardiance        Family and Social History   Family History   Problem Relation Age of Onset    Other - See Comments Mother         gallbladder surgery    Cerebrovascular Disease Father     Coronary Artery Disease Brother         Social History     Tobacco Use    Smoking  "status: Former    Smokeless tobacco: Never   Vaping Use    Vaping status: Never Used   Substance Use Topics    Alcohol use: Yes     Alcohol/week: 1.0 standard drink of alcohol     Comment: Alcoholic Drinks/day: rare - only in [place]    Drug use: No        Physical Exam   General Appearance:   No acute distress    /64 (BP Location: Left arm, Patient Position: Sitting, Cuff Size: Adult Regular)   Pulse 68   Temp 97.5  F (36.4  C) (Temporal)   Resp 13   Ht 1.778 m (5' 10\")   Wt 121.6 kg (268 lb)   SpO2 98%   BMI 38.45 kg/m      HEAD, EARS, NOSE, MOUTH, AND THROAT: Head and face were normal. Hearing was normal to voice and the ears were normal to external exam. Nose appearance was normal and there was no discharge.   NECK: Neck appearance was normal.   RESPIRATORY: Breathing pattern was normal and the chest moved symmetrically.   Lung sounds were normal and there were no abnormal sounds.  CARDIOVASCULAR: Heart rate and rhythm were normal.  S1 and S2 were normal and there were no extra sounds or murmurs.  There was no peripheral edema.  GASTROINTESTINAL: The abdomen was normal in contour.   NEUROLOGIC: The patient was alert and oriented to person, place, time, and circumstance. Speech was normal. Cranial nerves were normal. Motor strength was normal for age. The patient was normally coordinated.  PSYCHIATRIC:  Mood and affect were normal and the patient had normal recent and remote memory. The patient's judgment and insight were normal.       Additional Tests   Laboratory: BMP and CBC are pending    The longitudinal plan of care for the diagnosis(es)/condition(s) as documented were addressed during this visit. Due to the added complexity in care, I will continue to support Chaim in the subsequent management and with ongoing continuity of care.       Luis Elena MD  Internal Medicine  Contact me at [phone]  Answers submitted by the patient for this visit:  General Questionnaire (Submitted on " 5/7/2025)  Chief Complaint: Chronic problems general questions HPI Form  What is the reason for your visit today? : follow up  How many servings of fruits and vegetables do you eat daily?: 2-3  On average, how many sweetened beverages do you drink each day (Examples: soda, juice, sweet tea, etc.  Do NOT count diet or artificially sweetened beverages)?: 0  How many minutes a day do you exercise enough to make your heart beat faster?: 10 to 19  How many days a week do you exercise enough to make your heart beat faster?: 3 or less  How many days per week do you miss taking your medication?: 0  Questionnaire about: Chronic problems general questions HPI Form (Submitted on 5/7/2025)  Chief Complaint: Chronic problems general questions HPI Form

## 2025-05-08 ENCOUNTER — RESULTS FOLLOW-UP (OUTPATIENT)
Dept: INTERNAL MEDICINE | Facility: CLINIC | Age: 85
End: 2025-05-08

## 2025-05-22 ENCOUNTER — OFFICE VISIT (OUTPATIENT)
Dept: INTERNAL MEDICINE | Facility: CLINIC | Age: 85
End: 2025-05-22
Payer: MEDICARE

## 2025-05-22 VITALS
BODY MASS INDEX: 38.48 KG/M2 | OXYGEN SATURATION: 99 % | HEART RATE: 72 BPM | RESPIRATION RATE: 18 BRPM | TEMPERATURE: 97 F | HEIGHT: 70 IN | WEIGHT: 268.8 LBS | SYSTOLIC BLOOD PRESSURE: 136 MMHG | DIASTOLIC BLOOD PRESSURE: 74 MMHG

## 2025-05-22 DIAGNOSIS — E66.812 CLASS 2 SEVERE OBESITY DUE TO EXCESS CALORIES WITH SERIOUS COMORBIDITY AND BODY MASS INDEX (BMI) OF 38.0 TO 38.9 IN ADULT (H): ICD-10-CM

## 2025-05-22 DIAGNOSIS — K21.9 GASTROESOPHAGEAL REFLUX DISEASE WITHOUT ESOPHAGITIS: ICD-10-CM

## 2025-05-22 DIAGNOSIS — Z23 NEED FOR VACCINATION: ICD-10-CM

## 2025-05-22 DIAGNOSIS — I48.0 PAROXYSMAL ATRIAL FIBRILLATION (H): ICD-10-CM

## 2025-05-22 DIAGNOSIS — E78.5 HYPERLIPIDEMIA WITH TARGET LOW DENSITY LIPOPROTEIN (LDL) CHOLESTEROL LESS THAN 70 MG/DL: ICD-10-CM

## 2025-05-22 DIAGNOSIS — Z01.818 PREOPERATIVE EXAMINATION: Primary | ICD-10-CM

## 2025-05-22 DIAGNOSIS — I47.20 PAROXYSMAL VENTRICULAR TACHYCARDIA (H): ICD-10-CM

## 2025-05-22 DIAGNOSIS — Z95.810 ICD (IMPLANTABLE CARDIOVERTER-DEFIBRILLATOR), SINGLE, IN SITU: ICD-10-CM

## 2025-05-22 DIAGNOSIS — T78.3XXS ANGIOEDEMA, SEQUELA: ICD-10-CM

## 2025-05-22 DIAGNOSIS — Z66 DNR (DO NOT RESUSCITATE): ICD-10-CM

## 2025-05-22 DIAGNOSIS — I10 PRIMARY HYPERTENSION: ICD-10-CM

## 2025-05-22 DIAGNOSIS — G47.33 OSA (OBSTRUCTIVE SLEEP APNEA): ICD-10-CM

## 2025-05-22 DIAGNOSIS — E66.01 CLASS 2 SEVERE OBESITY DUE TO EXCESS CALORIES WITH SERIOUS COMORBIDITY AND BODY MASS INDEX (BMI) OF 38.0 TO 38.9 IN ADULT (H): ICD-10-CM

## 2025-05-22 DIAGNOSIS — M15.0 PRIMARY OSTEOARTHRITIS INVOLVING MULTIPLE JOINTS: ICD-10-CM

## 2025-05-22 DIAGNOSIS — I25.10 CHRONIC CORONARY ARTERY DISEASE: ICD-10-CM

## 2025-05-22 DIAGNOSIS — I50.22 CHRONIC HFREF (HEART FAILURE WITH REDUCED EJECTION FRACTION) (H): ICD-10-CM

## 2025-05-22 DIAGNOSIS — H25.9 AGE-RELATED CATARACT OF BOTH EYES, UNSPECIFIED AGE-RELATED CATARACT TYPE: ICD-10-CM

## 2025-05-22 PROBLEM — D69.6 THROMBOCYTOPENIA: Status: RESOLVED | Noted: 2023-05-01 | Resolved: 2025-05-22

## 2025-05-22 NOTE — PROGRESS NOTES
Preoperative Consultation   Chaim Dee   85 year old  male    Date of visit: 5/22/2025  Physician: Luis Elena MD    This is a preoperative consultation requested by Dr. Montaño in preparation for cataract surgery on 06/03/2025 (right) and 06/17/2025 (left) at [facility], fax [phone]       Assessment and Plan   Chaim Dee was seen in preoperative consultation in preparation for bilateral cataract surgery.  This is a low risk surgery and the patient has increased risk for major cardiac complications based on a history of myocardial infarction and congestive heart failure. Please note he has an ICD, Medtronic Evera, based on his history of paroxysmal ventricular tachycardia.  He is well compensated congestive heart failure.  Historically, he has had frequent episodes of angioedema, likely triggered by ACE inhibitors, ARBs and aspirin/NSAIDs.  He does have obstructive sleep apnea.  He has no cardiopulmonary contraindication to the proposed procedure and may proceed without further cardiopulmonary testing.    1. Preoperative examination    2. Age-related cataract of both eyes, unspecified age-related cataract type    3. Chronic HFrEF (heart failure with reduced ejection fraction) (H)    4. Chronic coronary artery disease    5. Paroxysmal ventricular tachycardia (H)    6. ICD (implantable cardioverter-defibrillator), single, in situ    7. Paroxysmal atrial fibrillation (H)    8. Primary hypertension    9. Hyperlipidemia with target low density lipoprotein (LDL) cholesterol less than 70 mg/dL    10. FLO - did not tolerate CPAP    11. Angioedema, sequela    12. Thrombocytopenia    13. Gastroesophageal reflux disease without esophagitis    14. Primary osteoarthritis involving multiple joints    15. DNR (do not resuscitate)    16. Class 2 severe obesity due to excess calories with serious comorbidity and body mass index (BMI) of 38.0 to 38.9 in adult (H)         Patient Profile   Social  History     Social History Narrative    Rod Villanueva, [employer].  Graduate [employer]  Was  for [employer].  From [employer].  He is into genealogy.          Past Medical History   Patient Active Problem List   Diagnosis    Chronic coronary artery disease    Primary hypertension    Class 2 severe obesity due to excess calories with serious comorbidity and body mass index (BMI) of 38.0 to 38.9 in adult (H)    Gastroesophageal reflux disease without esophagitis    FLO - did not tolerate CPAP    Angioedema    ICD (implantable cardioverter-defibrillator), single, in situ    DNR (do not resuscitate)    Paroxysmal ventricular tachycardia (H)    Primary osteoarthritis involving multiple joints    Chronic HFrEF (heart failure with reduced ejection fraction) (H)    Hyperlipidemia with target low density lipoprotein (LDL) cholesterol less than 70 mg/dL    Thrombocytopenia    Paroxysmal atrial fibrillation (H)       Past Surgical History  He has a past surgical history that includes Cardiac Defibrillator Placement (2015); Coronary Stent Placement (08/08/1999); Tonsillectomy (1944); Orif Tibia & Fibula Fractures (Right, 1945); fracture surgery; and Laparoscopic cholecystectomy (N/A, 11/9/2018).     History of Present Illness   This 85 year old man comes in for preoperative evaluation in preparation for cataract surgery.  He is feeling well    Recent antiplatelet use: yes clopidogrel  Personal or family history of bleeding or clotting disorders: no  Steroid use in the past year: yes occasional use with angioedema  Personal or family history of difficulty with anesthesia: no  Current cardiopulmonary symptoms: no  FLO: yes, not CPAP    Review of Systems: A comprehensive review of systems was negative except as noted.     Medications and Allergies   Current Outpatient Medications   Medication Sig Dispense Refill    acetaminophen (TYLENOL) 325 MG tablet [ACETAMINOPHEN (TYLENOL) 325 MG TABLET] Take 2 tablets  (650 mg total) by mouth every 4 (four) hours as needed.  0    clopidogrel (PLAVIX) 75 MG tablet TAKE 1 TABLET(75 MG) BY MOUTH DAILY 90 tablet 1    diphenhydrAMINE (BENADRYL) 25 MG tablet Take 1-2 tablets (25-50 mg) by mouth nightly as needed (angioedema)      famotidine (PEPCID) 20 MG tablet Take 1 tablet (20 mg) by mouth At Bedtime 180 tablet 3    fexofenadine (ALLEGRA) 180 MG tablet Take 1 tablet (180 mg) by mouth daily 90 tablet 3    hydrochlorothiazide (HYDRODIURIL) 25 MG tablet TAKE 1 TABLET BY MOUTH DAILY 90 tablet 1    metoprolol succinate ER (TOPROL XL) 100 MG 24 hr tablet TAKE 1 AND 1/2 TABLETS(150 MG) BY MOUTH DAILY 135 tablet 2    nitroglycerin (NITROSTAT) 0.4 MG SL tablet [NITROGLYCERIN (NITROSTAT) 0.4 MG SL TABLET] Place 1 tablet (0.4 mg total) under the tongue as needed. Use as directed. 30 tablet 0    simvastatin (ZOCOR) 40 MG tablet TAKE 1 TABLET(40 MG) BY MOUTH AT BEDTIME 90 tablet 1    vitamin D3 (CHOLECALCIFEROL) 50 mcg (2000 units) tablet Take 1 tablet (50 mcg) by mouth daily       Allergies   Allergen Reactions    Ace Inhibitors Angioedema    Arb-Angiotensin Receptor Antagonist [Angiotensin Receptor Blockers] Angioedema    Aspirin Unknown     Angioedema - stopped aspirin, angioedema resolved, restarted recurred (fairly strong evidence)    Gabapentin Angioedema    Jardiance [Empagliflozin] Angioedema     Angioedema (took at the same time as spironoloactone)    Losartan Angioedema    Ramipril Angioedema    Seafood Nausea and Vomiting    Shellfish Containing Products [Shellfish-Derived Products] Nausea and Vomiting     SHRIMP- worst reaction to.    Spironolactone Angioedema     Took one dose with Jardiance        Family and Social History   Family History   Problem Relation Age of Onset    Other - See Comments Mother         gallbladder surgery    Cerebrovascular Disease Father     Coronary Artery Disease Brother         Social History     Tobacco Use    Smoking status: Former    Smokeless tobacco:  "Never   Vaping Use    Vaping status: Never Used   Substance Use Topics    Alcohol use: Yes     Alcohol/week: 1.0 standard drink of alcohol     Comment: Alcoholic Drinks/day: rare - only in [place]    Drug use: No        Physical Exam   General Appearance:   No acute distress    /64 (BP Location: Left arm, Patient Position: Sitting, Cuff Size: Adult Regular)   Pulse 68   Temp 97.5  F (36.4  C) (Temporal)   Resp 13   Ht 1.778 m (5' 10\")   Wt 121.6 kg (268 lb)   SpO2 98%   BMI 38.45 kg/m      HEAD, EARS, NOSE, MOUTH, AND THROAT: Head and face were normal. Hearing was normal to voice and the ears were normal to external exam. Nose appearance was normal and there was no discharge.   NECK: Neck appearance was normal.   RESPIRATORY: Breathing pattern was normal and the chest moved symmetrically.   Lung sounds were normal and there were no abnormal sounds.  CARDIOVASCULAR: Heart rate and rhythm were normal.  S1 and S2 were normal and there were no extra sounds or murmurs.  There was no peripheral edema.  GASTROINTESTINAL: The abdomen was normal in contour.   NEUROLOGIC: The patient was alert and oriented to person, place, time, and circumstance. Speech was normal. Cranial nerves were normal. Motor strength was normal for age. The patient was normally coordinated.  PSYCHIATRIC:  Mood and affect were normal and the patient had normal recent and remote memory. The patient's judgment and insight were normal.       Additional Tests   Laboratory: 5/7/25 BMP and CBC normal    The longitudinal plan of care for the diagnosis(es)/condition(s) as documented were addressed during this visit. Due to the added complexity in care, I will continue to support Chaim in the subsequent management and with ongoing continuity of care.       Luis Elena MD  Internal Medicine  Contact me at [phone]  Answers submitted by the patient for this visit:  General Questionnaire (Submitted on 5/7/2025)  Chief Complaint: Chronic " problems general questions HPI Form  What is the reason for your visit today? : follow up  How many servings of fruits and vegetables do you eat daily?: 2-3  On average, how many sweetened beverages do you drink each day (Examples: soda, juice, sweet tea, etc.  Do NOT count diet or artificially sweetened beverages)?: 0  How many minutes a day do you exercise enough to make your heart beat faster?: 10 to 19  How many days a week do you exercise enough to make your heart beat faster?: 3 or less  How many days per week do you miss taking your medication?: 0  Questionnaire about: Chronic problems general questions HPI Form (Submitted on 5/7/2025)  Chief Complaint: Chronic problems general questions HPI Form

## 2025-06-05 ENCOUNTER — ANCILLARY PROCEDURE (OUTPATIENT)
Dept: CARDIOLOGY | Facility: CLINIC | Age: 85
End: 2025-06-05
Attending: INTERNAL MEDICINE
Payer: MEDICARE

## 2025-06-05 DIAGNOSIS — I25.5 ISCHEMIC CARDIOMYOPATHY: ICD-10-CM

## 2025-06-05 DIAGNOSIS — Z95.810 ICD (IMPLANTABLE CARDIOVERTER-DEFIBRILLATOR) IN PLACE: ICD-10-CM

## 2025-06-05 DIAGNOSIS — I49.01 VENTRICULAR FIBRILLATION (H): ICD-10-CM

## 2025-06-05 LAB
MDC_IDC_LEAD_CONNECTION_STATUS: NORMAL
MDC_IDC_LEAD_IMPLANT_DT: NORMAL
MDC_IDC_LEAD_LOCATION: NORMAL
MDC_IDC_LEAD_LOCATION_DETAIL_1: NORMAL
MDC_IDC_LEAD_MFG: NORMAL
MDC_IDC_LEAD_MODEL: NORMAL
MDC_IDC_LEAD_POLARITY_TYPE: NORMAL
MDC_IDC_LEAD_SERIAL: NORMAL
MDC_IDC_LEAD_SPECIAL_FUNCTION: NORMAL
MDC_IDC_MSMT_BATTERY_DTM: NORMAL
MDC_IDC_MSMT_BATTERY_REMAINING_LONGEVITY: 30 MO
MDC_IDC_MSMT_BATTERY_RRT_TRIGGER: 2.73
MDC_IDC_MSMT_BATTERY_STATUS: NORMAL
MDC_IDC_MSMT_BATTERY_VOLTAGE: 2.94 V
MDC_IDC_MSMT_LEADCHNL_RV_IMPEDANCE_VALUE: 304 OHM
MDC_IDC_MSMT_LEADCHNL_RV_IMPEDANCE_VALUE: 342 OHM
MDC_IDC_MSMT_LEADCHNL_RV_PACING_THRESHOLD_AMPLITUDE: 0.75 V
MDC_IDC_MSMT_LEADCHNL_RV_PACING_THRESHOLD_PULSEWIDTH: 0.4 MS
MDC_IDC_MSMT_LEADCHNL_RV_SENSING_INTR_AMPL: 5 MV
MDC_IDC_MSMT_LEADCHNL_RV_SENSING_INTR_AMPL: 5 MV
MDC_IDC_PG_IMPLANT_DTM: NORMAL
MDC_IDC_PG_MFG: NORMAL
MDC_IDC_PG_MODEL: NORMAL
MDC_IDC_PG_SERIAL: NORMAL
MDC_IDC_PG_TYPE: NORMAL
MDC_IDC_SESS_CLINIC_NAME: NORMAL
MDC_IDC_SESS_DTM: NORMAL
MDC_IDC_SESS_TYPE: NORMAL
MDC_IDC_SET_BRADY_HYSTRATE: NORMAL
MDC_IDC_SET_BRADY_LOWRATE: 40 {BEATS}/MIN
MDC_IDC_SET_BRADY_MODE: NORMAL
MDC_IDC_SET_LEADCHNL_RV_PACING_AMPLITUDE: 1.5 V
MDC_IDC_SET_LEADCHNL_RV_PACING_ANODE_ELECTRODE_1: NORMAL
MDC_IDC_SET_LEADCHNL_RV_PACING_ANODE_LOCATION_1: NORMAL
MDC_IDC_SET_LEADCHNL_RV_PACING_CAPTURE_MODE: NORMAL
MDC_IDC_SET_LEADCHNL_RV_PACING_CATHODE_ELECTRODE_1: NORMAL
MDC_IDC_SET_LEADCHNL_RV_PACING_CATHODE_LOCATION_1: NORMAL
MDC_IDC_SET_LEADCHNL_RV_PACING_POLARITY: NORMAL
MDC_IDC_SET_LEADCHNL_RV_PACING_PULSEWIDTH: 0.4 MS
MDC_IDC_SET_LEADCHNL_RV_SENSING_ANODE_ELECTRODE_1: NORMAL
MDC_IDC_SET_LEADCHNL_RV_SENSING_ANODE_LOCATION_1: NORMAL
MDC_IDC_SET_LEADCHNL_RV_SENSING_CATHODE_ELECTRODE_1: NORMAL
MDC_IDC_SET_LEADCHNL_RV_SENSING_CATHODE_LOCATION_1: NORMAL
MDC_IDC_SET_LEADCHNL_RV_SENSING_POLARITY: NORMAL
MDC_IDC_SET_LEADCHNL_RV_SENSING_SENSITIVITY: 0.3 MV
MDC_IDC_SET_ZONE_DETECTION_BEATS_DENOMINATOR: 16 {BEATS}
MDC_IDC_SET_ZONE_DETECTION_BEATS_DENOMINATOR: 28 {BEATS}
MDC_IDC_SET_ZONE_DETECTION_BEATS_DENOMINATOR: 32 {BEATS}
MDC_IDC_SET_ZONE_DETECTION_BEATS_NUMERATOR: 16 {BEATS}
MDC_IDC_SET_ZONE_DETECTION_BEATS_NUMERATOR: 24 {BEATS}
MDC_IDC_SET_ZONE_DETECTION_BEATS_NUMERATOR: 28 {BEATS}
MDC_IDC_SET_ZONE_DETECTION_INTERVAL: 240 MS
MDC_IDC_SET_ZONE_DETECTION_INTERVAL: 320 MS
MDC_IDC_SET_ZONE_DETECTION_INTERVAL: 360 MS
MDC_IDC_SET_ZONE_DETECTION_INTERVAL: 400 MS
MDC_IDC_SET_ZONE_STATUS: NORMAL
MDC_IDC_SET_ZONE_TYPE: NORMAL
MDC_IDC_SET_ZONE_VENDOR_TYPE: NORMAL
MDC_IDC_STAT_BRADY_DTM_END: NORMAL
MDC_IDC_STAT_BRADY_DTM_START: NORMAL
MDC_IDC_STAT_BRADY_RV_PERCENT_PACED: 0.23 %
MDC_IDC_STAT_EPISODE_RECENT_COUNT: 0
MDC_IDC_STAT_EPISODE_RECENT_COUNT_DTM_END: NORMAL
MDC_IDC_STAT_EPISODE_RECENT_COUNT_DTM_START: NORMAL
MDC_IDC_STAT_EPISODE_TOTAL_COUNT: 0
MDC_IDC_STAT_EPISODE_TOTAL_COUNT: 1
MDC_IDC_STAT_EPISODE_TOTAL_COUNT: 3
MDC_IDC_STAT_EPISODE_TOTAL_COUNT: 343
MDC_IDC_STAT_EPISODE_TOTAL_COUNT_DTM_END: NORMAL
MDC_IDC_STAT_EPISODE_TOTAL_COUNT_DTM_START: NORMAL
MDC_IDC_STAT_EPISODE_TYPE: NORMAL
MDC_IDC_STAT_TACHYTHERAPY_ATP_DELIVERED_RECENT: 0
MDC_IDC_STAT_TACHYTHERAPY_ATP_DELIVERED_TOTAL: 0
MDC_IDC_STAT_TACHYTHERAPY_RECENT_DTM_END: NORMAL
MDC_IDC_STAT_TACHYTHERAPY_RECENT_DTM_START: NORMAL
MDC_IDC_STAT_TACHYTHERAPY_SHOCKS_ABORTED_RECENT: 0
MDC_IDC_STAT_TACHYTHERAPY_SHOCKS_ABORTED_TOTAL: 0
MDC_IDC_STAT_TACHYTHERAPY_SHOCKS_DELIVERED_RECENT: 0
MDC_IDC_STAT_TACHYTHERAPY_SHOCKS_DELIVERED_TOTAL: 1
MDC_IDC_STAT_TACHYTHERAPY_TOTAL_DTM_END: NORMAL
MDC_IDC_STAT_TACHYTHERAPY_TOTAL_DTM_START: NORMAL

## 2025-07-07 DIAGNOSIS — I25.10 CHRONIC CORONARY ARTERY DISEASE: ICD-10-CM

## 2025-07-07 DIAGNOSIS — E78.2 MIXED HYPERLIPIDEMIA: ICD-10-CM

## 2025-07-07 DIAGNOSIS — I10 ESSENTIAL HYPERTENSION: ICD-10-CM

## 2025-07-07 RX ORDER — SIMVASTATIN 40 MG
40 TABLET ORAL AT BEDTIME
Qty: 90 TABLET | Refills: 1 | Status: SHIPPED | OUTPATIENT
Start: 2025-07-07

## 2025-07-07 RX ORDER — CLOPIDOGREL BISULFATE 75 MG/1
75 TABLET ORAL DAILY
Qty: 90 TABLET | Refills: 1 | Status: SHIPPED | OUTPATIENT
Start: 2025-07-07

## 2025-07-07 RX ORDER — HYDROCHLOROTHIAZIDE 25 MG/1
25 TABLET ORAL DAILY
Qty: 90 TABLET | Refills: 1 | Status: SHIPPED | OUTPATIENT
Start: 2025-07-07

## 2025-08-05 DIAGNOSIS — I10 ESSENTIAL HYPERTENSION: ICD-10-CM

## 2025-08-05 RX ORDER — METOPROLOL SUCCINATE 100 MG/1
150 TABLET, EXTENDED RELEASE ORAL DAILY
Qty: 135 TABLET | Refills: 2 | Status: SHIPPED | OUTPATIENT
Start: 2025-08-05

## 2025-08-12 ENCOUNTER — OFFICE VISIT (OUTPATIENT)
Dept: INTERNAL MEDICINE | Facility: CLINIC | Age: 85
End: 2025-08-12
Payer: MEDICARE

## 2025-08-12 VITALS
OXYGEN SATURATION: 97 % | HEIGHT: 70 IN | BODY MASS INDEX: 38.94 KG/M2 | HEART RATE: 62 BPM | WEIGHT: 272 LBS | SYSTOLIC BLOOD PRESSURE: 126 MMHG | DIASTOLIC BLOOD PRESSURE: 70 MMHG | TEMPERATURE: 97.5 F | RESPIRATION RATE: 16 BRPM

## 2025-08-12 DIAGNOSIS — I25.10 CHRONIC CORONARY ARTERY DISEASE: ICD-10-CM

## 2025-08-12 DIAGNOSIS — I48.0 PAROXYSMAL ATRIAL FIBRILLATION (H): ICD-10-CM

## 2025-08-12 DIAGNOSIS — I47.20 PAROXYSMAL VENTRICULAR TACHYCARDIA (H): ICD-10-CM

## 2025-08-12 DIAGNOSIS — I10 PRIMARY HYPERTENSION: ICD-10-CM

## 2025-08-12 DIAGNOSIS — T78.3XXS ANGIOEDEMA, SEQUELA: ICD-10-CM

## 2025-08-12 DIAGNOSIS — E66.812 CLASS 2 SEVERE OBESITY DUE TO EXCESS CALORIES WITH SERIOUS COMORBIDITY AND BODY MASS INDEX (BMI) OF 38.0 TO 38.9 IN ADULT (H): ICD-10-CM

## 2025-08-12 DIAGNOSIS — I50.22 CHRONIC HFREF (HEART FAILURE WITH REDUCED EJECTION FRACTION) (H): Primary | ICD-10-CM

## 2025-08-12 DIAGNOSIS — E66.01 CLASS 2 SEVERE OBESITY DUE TO EXCESS CALORIES WITH SERIOUS COMORBIDITY AND BODY MASS INDEX (BMI) OF 38.0 TO 38.9 IN ADULT (H): ICD-10-CM

## 2025-08-12 PROCEDURE — 3078F DIAST BP <80 MM HG: CPT | Performed by: INTERNAL MEDICINE

## 2025-08-12 PROCEDURE — 99214 OFFICE O/P EST MOD 30 MIN: CPT | Performed by: INTERNAL MEDICINE

## 2025-08-12 PROCEDURE — 3074F SYST BP LT 130 MM HG: CPT | Performed by: INTERNAL MEDICINE

## 2025-08-12 PROCEDURE — G2211 COMPLEX E/M VISIT ADD ON: HCPCS | Performed by: INTERNAL MEDICINE
